# Patient Record
Sex: MALE | Race: WHITE | NOT HISPANIC OR LATINO | Employment: FULL TIME | URBAN - METROPOLITAN AREA
[De-identification: names, ages, dates, MRNs, and addresses within clinical notes are randomized per-mention and may not be internally consistent; named-entity substitution may affect disease eponyms.]

---

## 2017-01-03 ENCOUNTER — ALLSCRIPTS OFFICE VISIT (OUTPATIENT)
Dept: OTHER | Facility: OTHER | Age: 47
End: 2017-01-03

## 2017-04-03 ENCOUNTER — ALLSCRIPTS OFFICE VISIT (OUTPATIENT)
Dept: OTHER | Facility: OTHER | Age: 47
End: 2017-04-03

## 2017-08-01 ENCOUNTER — ALLSCRIPTS OFFICE VISIT (OUTPATIENT)
Dept: OTHER | Facility: OTHER | Age: 47
End: 2017-08-01

## 2017-08-30 ENCOUNTER — ALLSCRIPTS OFFICE VISIT (OUTPATIENT)
Dept: OTHER | Facility: OTHER | Age: 47
End: 2017-08-30

## 2017-10-25 RX ORDER — ESCITALOPRAM OXALATE 20 MG/1
5 TABLET ORAL DAILY
COMMUNITY
End: 2018-02-22

## 2017-10-25 NOTE — PRE-PROCEDURE INSTRUCTIONS
Pre-Surgery Instructions:   Medication Instructions    escitalopram (LEXAPRO) 20 mg tablet Instructed patient per Anesthesia Guidelines

## 2017-10-26 ENCOUNTER — ANESTHESIA EVENT (OUTPATIENT)
Dept: PERIOP | Facility: HOSPITAL | Age: 47
End: 2017-10-26
Payer: COMMERCIAL

## 2017-10-26 NOTE — ANESTHESIA PREPROCEDURE EVALUATION
Review of Systems/Medical History  Patient summary reviewed  Chart reviewed  No history of anesthetic complications     Cardiovascular  Negative cardio ROS Exercise tolerance: good,     Pulmonary  Negative pulmonary ROS ,        GI/Hepatic    GERD well controlled,   Comment: Umbilical hernia     Negative  ROS        Endo/Other  Negative endo/other ROS      GYN       Hematology  Negative hematology ROS      Musculoskeletal  Negative musculoskeletal ROS        Neurology  Negative neurology ROS      Psychology   Anxiety, Depression ,            Physical Exam    Airway    Mallampati score: III  TM Distance: >3 FB  Neck ROM: full     Dental   No notable dental hx     Cardiovascular  Comment: Negative ROS, Rhythm: regular, Rate: normal, Cardiovascular exam normal    Pulmonary  Pulmonary exam normal Breath sounds clear to auscultation,     Other Findings        Anesthesia Plan  ASA Score- 2       Anesthesia Type- general with ASA Monitors  Additional Monitors:   Airway Plan: LMA  Induction- intravenous  Informed Consent- Anesthetic plan and risks discussed with patient and mother  NPO and allergies verified  Pt took his escitalopram and Cialis this AM     Plan: GA    Risks and benefits discussed with pt  Questions answered  Pt consented

## 2017-10-27 ENCOUNTER — HOSPITAL ENCOUNTER (OUTPATIENT)
Facility: HOSPITAL | Age: 47
Setting detail: OUTPATIENT SURGERY
Discharge: HOME | End: 2017-10-27
Attending: SURGERY | Admitting: SURGERY
Payer: COMMERCIAL

## 2017-10-27 ENCOUNTER — ANESTHESIA (OUTPATIENT)
Dept: PERIOP | Facility: HOSPITAL | Age: 47
End: 2017-10-27
Payer: COMMERCIAL

## 2017-10-27 VITALS
TEMPERATURE: 98.4 F | SYSTOLIC BLOOD PRESSURE: 141 MMHG | HEIGHT: 73 IN | DIASTOLIC BLOOD PRESSURE: 89 MMHG | HEART RATE: 64 BPM | BODY MASS INDEX: 29.82 KG/M2 | WEIGHT: 225 LBS | OXYGEN SATURATION: 96 % | RESPIRATION RATE: 16 BRPM

## 2017-10-27 PROCEDURE — C1781 MESH (IMPLANTABLE): HCPCS | Performed by: SURGERY

## 2017-10-27 DEVICE — VENTRALEX ST HERNIA PATCH
Type: IMPLANTABLE DEVICE | Site: UMBILICAL | Status: FUNCTIONAL
Brand: VENTRALEX ST HERNIA PATCH

## 2017-10-27 RX ORDER — PROPOFOL 10 MG/ML
INJECTION, EMULSION INTRAVENOUS AS NEEDED
Status: DISCONTINUED | OUTPATIENT
Start: 2017-10-27 | End: 2017-10-27 | Stop reason: SURG

## 2017-10-27 RX ORDER — OXYCODONE HYDROCHLORIDE AND ACETAMINOPHEN 5; 325 MG/1; MG/1
1 TABLET ORAL EVERY 4 HOURS PRN
Qty: 20 TABLET | Refills: 0
Start: 2017-10-27 | End: 2017-11-06

## 2017-10-27 RX ORDER — FENTANYL CITRATE 50 UG/ML
INJECTION, SOLUTION INTRAMUSCULAR; INTRAVENOUS AS NEEDED
Status: DISCONTINUED | OUTPATIENT
Start: 2017-10-27 | End: 2017-10-27 | Stop reason: SURG

## 2017-10-27 RX ORDER — BUPIVACAINE HYDROCHLORIDE AND EPINEPHRINE 5; 5 MG/ML; UG/ML
INJECTION, SOLUTION PERINEURAL AS NEEDED
Status: DISCONTINUED | OUTPATIENT
Start: 2017-10-27 | End: 2017-10-27 | Stop reason: HOSPADM

## 2017-10-27 RX ORDER — SODIUM CHLORIDE, SODIUM LACTATE, POTASSIUM CHLORIDE, CALCIUM CHLORIDE 600; 310; 30; 20 MG/100ML; MG/100ML; MG/100ML; MG/100ML
100 INJECTION, SOLUTION INTRAVENOUS CONTINUOUS
Status: DISCONTINUED | OUTPATIENT
Start: 2017-10-27 | End: 2017-10-27 | Stop reason: HOSPADM

## 2017-10-27 RX ORDER — ONDANSETRON 2 MG/ML
4 INJECTION INTRAMUSCULAR; INTRAVENOUS ONCE AS NEEDED
Status: DISCONTINUED | OUTPATIENT
Start: 2017-10-27 | End: 2017-10-27 | Stop reason: HOSPADM

## 2017-10-27 RX ORDER — EPHEDRINE SULFATE 50 MG/ML
INJECTION, SOLUTION INTRAVENOUS AS NEEDED
Status: DISCONTINUED | OUTPATIENT
Start: 2017-10-27 | End: 2017-10-27 | Stop reason: SURG

## 2017-10-27 RX ORDER — ONDANSETRON 2 MG/ML
INJECTION INTRAMUSCULAR; INTRAVENOUS AS NEEDED
Status: DISCONTINUED | OUTPATIENT
Start: 2017-10-27 | End: 2017-10-27 | Stop reason: SURG

## 2017-10-27 RX ORDER — CLINDAMYCIN PHOSPHATE 900 MG/50ML
900 INJECTION INTRAVENOUS ONCE
Status: COMPLETED | OUTPATIENT
Start: 2017-10-27 | End: 2017-10-27

## 2017-10-27 RX ORDER — LIDOCAINE HYDROCHLORIDE 10 MG/ML
INJECTION, SOLUTION INFILTRATION; PERINEURAL AS NEEDED
Status: DISCONTINUED | OUTPATIENT
Start: 2017-10-27 | End: 2017-10-27 | Stop reason: SURG

## 2017-10-27 RX ORDER — KETOROLAC TROMETHAMINE 30 MG/ML
INJECTION, SOLUTION INTRAMUSCULAR; INTRAVENOUS AS NEEDED
Status: DISCONTINUED | OUTPATIENT
Start: 2017-10-27 | End: 2017-10-27 | Stop reason: SURG

## 2017-10-27 RX ORDER — TADALAFIL 2.5 MG/1
2.5 TABLET ORAL DAILY
COMMUNITY
End: 2019-02-13

## 2017-10-27 RX ORDER — FENTANYL CITRATE/PF 50 MCG/ML
50 SYRINGE (ML) INJECTION
Status: DISCONTINUED | OUTPATIENT
Start: 2017-10-27 | End: 2017-10-27 | Stop reason: HOSPADM

## 2017-10-27 RX ORDER — MAGNESIUM HYDROXIDE 1200 MG/15ML
LIQUID ORAL AS NEEDED
Status: DISCONTINUED | OUTPATIENT
Start: 2017-10-27 | End: 2017-10-27 | Stop reason: HOSPADM

## 2017-10-27 RX ADMIN — PROPOFOL 200 MG: 10 INJECTION, EMULSION INTRAVENOUS at 10:59

## 2017-10-27 RX ADMIN — SODIUM CHLORIDE, SODIUM LACTATE, POTASSIUM CHLORIDE, AND CALCIUM CHLORIDE 100 ML/HR: .6; .31; .03; .02 INJECTION, SOLUTION INTRAVENOUS at 10:08

## 2017-10-27 RX ADMIN — FENTANYL CITRATE 25 MCG: 50 INJECTION, SOLUTION INTRAMUSCULAR; INTRAVENOUS at 11:09

## 2017-10-27 RX ADMIN — KETOROLAC TROMETHAMINE 30 MG: 30 INJECTION, SOLUTION INTRAMUSCULAR at 11:31

## 2017-10-27 RX ADMIN — ONDANSETRON 4 MG: 2 INJECTION INTRAMUSCULAR; INTRAVENOUS at 11:30

## 2017-10-27 RX ADMIN — EPHEDRINE SULFATE 10 MG: 50 INJECTION, SOLUTION INTRAMUSCULAR; INTRAVENOUS; SUBCUTANEOUS at 11:19

## 2017-10-27 RX ADMIN — LIDOCAINE HYDROCHLORIDE 100 MG: 10 INJECTION, SOLUTION INFILTRATION; PERINEURAL at 10:58

## 2017-10-27 RX ADMIN — FENTANYL CITRATE 25 MCG: 50 INJECTION, SOLUTION INTRAMUSCULAR; INTRAVENOUS at 11:10

## 2017-10-27 RX ADMIN — CLINDAMYCIN PHOSPHATE 900 MG: 18 INJECTION, SOLUTION INTRAMUSCULAR; INTRAVENOUS at 11:05

## 2017-10-27 NOTE — DISCHARGE INSTRUCTIONS
Please follow-up as scheduled  Activity:  - No lifting greater than 20 pounds or strenuous physical activity or exercise for 2 weeks  - Walking and normal light activities are encouraged  - Normal daily activities including climbing steps are okay  - No driving until no longer using pain medications  Return to work:    - You may return to work on Monday, 10/30/2017    Diet:    - You may resume your normal diet  Wound Care:  - May shower daily  No tub baths or swimming until cleared by your surgeon   - Wash incision gently with soap and water and pat dry  - Do not apply any creams or ointments unless instructed to do so by your surgeon   - Margarita Wasserman may apply ice as needed (no longer than 20 minutes at a time) for the first 48 hours  - Bruising is not unusual and will go away with a little time  You may apply a warm, moist compress that may help the bruising resolve quicker  - You may remove the dressings the day after surgery (unless otherwise instructed)  Leave any skin tapes (steri-strips) on the incision(s) in place until they fall off on their own  Any new dressings are optional     Medications:    - You may resume all of your regular medications, including blood thinners and aspirin, after going home unless otherwise instructed  Please refer to your discharge medication list for further details  - Please take the pain medications as directed  - You are encouraged to use non-narcotic pain medications first and whenever possible  Reserve the use of narcotic pain medication for moderate to severe pain not controlled by non-narcotic medications   - No driving while taking narcotic pain medications  - You may become constipated, especially if taking pain medications  You may take any over the counter stool softeners or laxatives as needed  Examples: Milk of Magnesia, Colace, Senna      Additional Instructions:  - If you have any questions or concerns after discharge please call the office   - Call office or return to ER if fever greater than 101, chills, persistent nausea/vomiting, worsening/uncontrollable pain, and/or increasing redness or purulent/foul smelling drainage from incision(s)

## 2017-10-27 NOTE — ANESTHESIA POSTPROCEDURE EVALUATION
Post-Op Assessment Note      CV Status:  Stable    Mental Status:  Alert and awake    Hydration Status:  Euvolemic and stable    PONV Controlled:  None    Airway Patency:  Patent    Post Op Vitals Reviewed: Yes          Staff: Anesthesiologist           /76 (10/27/17 1151)    Temp 99 9 °F (37 7 °C) (10/27/17 1151)    Pulse 96 (10/27/17 1151)   Resp (!) 11 (10/27/17 1151)    SpO2 98 % (10/27/17 1151)      Pt transported to PACU extubated and on supplemental O2  Pt was awake and alert, with stable vital signs  Signout was given to PACU RN

## 2017-10-27 NOTE — OP NOTE
OPERATIVE REPORT  PATIENT NAME: Santana Yanes    :  1970  MRN: [id number]  Pt Location: [facility] OR ROOM 14    SURGERY DATE: 10/27/2017    Surgeon(s) and Role:     * Angie Faust MD - Primary     Roni Roca - Assisting    Preop Diagnosis:  Umbilical hernia [G45 8]    Post-Op Diagnosis Codes:     * Umbilical hernia [X33 0]    Procedure(s) (LRB):  REPAIR HERNIA UMBILICAL with mesh (N/A)    Specimen(s):  * No specimens in log *    Estimated Blood Loss:   Minimal    Drains:       Anesthesia Type:   Choice    Operative Indications:  Umbilical hernia [V97 4]      Operative Findings:  3 cm umbilical hernia fat containing    Complications:   None    Procedure and Technique:  Patient was seen in preoperative holding his most recent H and P was reviewed and was concurrent with his most recent exam no changes to his medical history were noted    Patient was brought to the operating room and identified by name and birth date he was then placed on the operating table general anesthesia was then achieved patient was given 900 mg of clindamycin due to a penicillin anaphylaxis  The patient was then draped and prepped in the usual sterile fashion a time-out was then held  An infraumbilical incision was then made dissection was carried with electrocautery down to the level of the fascia the am fat containing hernia was then dissected from the fascia circumferentially a small circular Ventralex mesh measuring 4 3 cm in diameter was then placed below the fascia this was secured in place and the fascia was closed over the mesh with an 0 Prolene suture  His umbilicus was then tacked down using a 3 0 Vicryl suture  Skin was closed with 4 0 Monocryl      Patient tolerated the procedure and there were no complications  Dr Arnaldo Lowe was present for the entire procedure        Patient Disposition:  PACU     SIGNATURE: Amanda Marinelli  DATE: 2017  TIME: 11:52 AM

## 2017-10-31 ENCOUNTER — GENERIC CONVERSION - ENCOUNTER (OUTPATIENT)
Dept: OTHER | Facility: OTHER | Age: 47
End: 2017-10-31

## 2017-11-08 ENCOUNTER — GENERIC CONVERSION - ENCOUNTER (OUTPATIENT)
Dept: OTHER | Facility: OTHER | Age: 47
End: 2017-11-08

## 2017-12-18 ENCOUNTER — GENERIC CONVERSION - ENCOUNTER (OUTPATIENT)
Dept: OTHER | Facility: OTHER | Age: 47
End: 2017-12-18

## 2017-12-18 ENCOUNTER — ALLSCRIPTS OFFICE VISIT (OUTPATIENT)
Dept: OTHER | Facility: OTHER | Age: 47
End: 2017-12-18

## 2017-12-26 DIAGNOSIS — Z13.29 ENCOUNTER FOR SCREENING FOR OTHER SUSPECTED ENDOCRINE DISORDER: ICD-10-CM

## 2017-12-26 DIAGNOSIS — K21.9 GASTRO-ESOPHAGEAL REFLUX DISEASE WITHOUT ESOPHAGITIS: ICD-10-CM

## 2017-12-26 DIAGNOSIS — Z13.1 ENCOUNTER FOR SCREENING FOR DIABETES MELLITUS: ICD-10-CM

## 2017-12-26 DIAGNOSIS — Z00.00 ENCOUNTER FOR GENERAL ADULT MEDICAL EXAMINATION WITHOUT ABNORMAL FINDINGS: ICD-10-CM

## 2017-12-26 DIAGNOSIS — Z12.5 ENCOUNTER FOR SCREENING FOR MALIGNANT NEOPLASM OF PROSTATE: ICD-10-CM

## 2017-12-26 DIAGNOSIS — Z09 ENCOUNTER FOR FOLLOW-UP EXAMINATION AFTER COMPLETED TREATMENT FOR CONDITIONS OTHER THAN MALIGNANT NEOPLASM: ICD-10-CM

## 2018-01-12 VITALS
DIASTOLIC BLOOD PRESSURE: 82 MMHG | SYSTOLIC BLOOD PRESSURE: 132 MMHG | HEIGHT: 72 IN | WEIGHT: 229.38 LBS | HEART RATE: 70 BPM | BODY MASS INDEX: 31.07 KG/M2 | RESPIRATION RATE: 16 BRPM

## 2018-01-12 NOTE — MISCELLANEOUS
Message  Patient called to reschedule his p/o appointment  He mentioned that he has a rash all over his body 24 hrs after surgery  He never took any pain medications, Has no problems with swelling or swallowing  He states that it is starting to diminish and asked if he should take Benadryl  I informed patient that I would ask Dr Angie Batista to advise and get back to patient  Spoke with Dr Angie Batista he advised that if he is itchy he can take Benadryl though if it doesn't get better to see his PCP  Called patient with information, patient understood  1        1 Amended By: Berkley Curiel; Nov 01 2017 8:53 AM EST    Active Problems   1  Depression (311) (F32 9)  2  Depression (311) (F32 9)  3  Erectile dysfunction of non-organic origin (302 72) (F52 21)  4  Esophageal reflux (530 81) (K21 9)  5  Hernia, umbilical (668 9) (Y77 2)  6  Prostate cancer screening (V76 44) (Z12 5)  7  Screening for diabetes mellitus (V77 1) (Z13 1)  8  Screening for hypothyroidism (V77 0) (Z13 29)  9  Screening for lipid disorders (V77 91) (Z13 220)    Current Meds  1  Cialis 5 MG Oral Tablet; take one tablet by mouth one time daily; Therapy: 05HEX4680 to (Evaluate:16Jun2018)  Requested for: 27OVB9025; Last   Rx:19Oct2017 Ordered  2  Escitalopram Oxalate 10 MG Oral Tablet (Lexapro); TAKE 1 TABLET ORALLY DAILY; Therapy: 42KVR8681 to (Evaluate:69Niq3018)  Requested for: 02Myy4385; Last   Rx:01Jun2017 Ordered    Allergies   1   Penicillins    Signatures   Electronically signed by : Jaxon Garcia OM; Nov 1 2017  8:54AM EST                       (Author)

## 2018-01-13 VITALS
BODY MASS INDEX: 30.85 KG/M2 | RESPIRATION RATE: 16 BRPM | SYSTOLIC BLOOD PRESSURE: 120 MMHG | WEIGHT: 227.5 LBS | DIASTOLIC BLOOD PRESSURE: 82 MMHG | HEART RATE: 68 BPM

## 2018-01-13 VITALS
SYSTOLIC BLOOD PRESSURE: 140 MMHG | HEIGHT: 72 IN | DIASTOLIC BLOOD PRESSURE: 80 MMHG | WEIGHT: 235 LBS | BODY MASS INDEX: 31.83 KG/M2 | TEMPERATURE: 98.2 F

## 2018-01-14 VITALS
RESPIRATION RATE: 16 BRPM | BODY MASS INDEX: 31.15 KG/M2 | WEIGHT: 230 LBS | HEIGHT: 72 IN | SYSTOLIC BLOOD PRESSURE: 116 MMHG | HEART RATE: 70 BPM | DIASTOLIC BLOOD PRESSURE: 80 MMHG

## 2018-01-17 NOTE — PROGRESS NOTES
Assessment    1  Screening for diabetes mellitus (V77 1) (Z13 1)   2  Screening for hypothyroidism (V77 0) (Z13 29)   3  Screening for lipid disorders (V77 91) (Z13 220)   4  Prostate cancer screening (V76 44) (Z12 5)   5  Depression (311) (F32 9)   6  Erectile dysfunction of non-organic origin (302 72) (F52 21)    Plan  Depression    · Escitalopram Oxalate 10 MG Oral Tablet (Lexapro); Take 1 tablet daily  Depression, Prostate cancer screening, Screening for diabetes mellitus, Screening for  hypothyroidism, Screening for lipid disorders    · (1) CBC/PLT/DIFF; Status:Active; Requested for:49Gxp6572;    · (1) TSH; Status:Active; Requested for:06Jlj8880;   Erectile dysfunction of non-organic origin    · Sildenafil Citrate 20 MG Oral Tablet; take 2-3 tabs as needed no more than once a  day  Prostate cancer screening, Screening for diabetes mellitus, Screening for  hypothyroidism, Screening for lipid disorders    · (1) COMPREHENSIVE METABOLIC PANEL; Status:Active; Requested for:37Whu3722;    · (1) LIPID PANEL FASTING W DIRECT LDL REFLEX; Status:Active; Requested  for:30Pte0915;    · (1) PSA (SCREEN) (Dx V76 44 Screen for Prostate Cancer); Status:Active; Requested  for:54Jru1396;    · Follow-up visit in 3 weeks Evaluation and Treatment  Follow-up  Status: Complete  Done:  [id number]    History of Present Illness  HPI: Patient is beginning to have significant anxiety and depression again    He has an SO and she is concerned  Patient has been treated in the past with Lexapro and did very well  He would like to start again  There is no suicidal ideation  There is no abuse of his partner  He has ED  He would like to try a course of sildenafil  Certainly his overal l depression and anxiety is playing a role, but he would like to use sildenafil for a period of time as he becomes more confident  He does get an erection, but is unable to finish intercourse  This has been a recurrent problem from time to time   It has responded well to cialis  Review of Systems    Constitutional: No fever or chills, feels well, no tiredness, no recent weight gain or weight loss  Eyes: No complaints of eye pain, no red eyes, no discharge from eyes, no itchy eyes  ENT: no complaints of earache, no hearing loss, no nosebleeds, no nasal discharge, no sore throat, no hoarseness  Cardiovascular: No complaints of slow heart rate, no fast heart rate, no chest pain, no palpitations, no leg claudication, no lower extremity  Respiratory: No complaints of shortness of breath, no wheezing, no cough, no SOB on exertion, no orthopnea or PND  Active Problems    1  Depression (311) (F32 9)   2  Depression (311) (F32 9)   3  Erectile dysfunction of non-organic origin (302 72) (F52 21)    Past Medical History    · History of Denial Of Any Significant Medical History    Surgical History    · History of Knee Surgery    Family History  Family History    · Family history of Family Health Status Of Mother - Alive    Social History    · Being A Social Drinker   · Caffeine Use   · Never smoker    Current Meds   1  Escitalopram Oxalate 10 MG Oral Tablet; Take 1 tablet daily; Therapy: 26RKH8883 to (Tre Wang)  Requested for: 05Hfz4420; Last   Rx:00Qtx7399 Ordered    Allergies    1  Penicillins    Vitals   Recorded: 20WYN9216 03:13PM   Heart Rate 68   Respiration 16   Systolic 028   Diastolic 78   Weight 697 lb 8 0 oz     Physical Exam    Constitutional   General appearance: No acute distress, well appearing and well nourished  Psychiatric   Judgment and insight: Normal     Orientation to person, place and time: Normal     Recent and remote memory: Intact  Mood and affect: Normal        Future Appointments    Date/Time Provider Specialty Site   01/03/2017 08:30 AM DONITA Leon   [address] [facility]     Signatures   Electronically signed by : DONITA Bennett ; Dec  5 2016 10:30PM EST                       (Author)

## 2018-01-22 VITALS
RESPIRATION RATE: 16 BRPM | SYSTOLIC BLOOD PRESSURE: 110 MMHG | HEART RATE: 64 BPM | WEIGHT: 236.25 LBS | DIASTOLIC BLOOD PRESSURE: 70 MMHG | BODY MASS INDEX: 32.04 KG/M2

## 2018-01-22 VITALS
HEIGHT: 72 IN | WEIGHT: 233.25 LBS | SYSTOLIC BLOOD PRESSURE: 120 MMHG | BODY MASS INDEX: 31.59 KG/M2 | DIASTOLIC BLOOD PRESSURE: 80 MMHG

## 2018-01-23 NOTE — PROGRESS NOTES
Assessment    1  Encounter for preventive health examination (V70 0) (Z00 00)   2  Esophageal reflux (530 81) (K21 9)   3  Prostate cancer screening (V76 44) (Z12 5)   4  Special screening examination for diabetes mellitus (V77 1) (Z13 1)   5  Screening for hypothyroidism (V77 0) (Z13 29)    Plan  Depression    · Escitalopram Oxalate 10 MG Oral Tablet (Lexapro); TAKE 1 TABLET ORALLY  DAILY   · Follow-up visit in 6 months Evaluation and Treatment  Follow-up  Status: Hold For -  Scheduling  Requested for: 52PPZ0106  Depression, Health Maintenance, Esophageal reflux    · Follow-up visit in 1 year Evaluation and Treatment  Follow-up and CPX  Status: Complete   Done: 29SOC5627  Erectile dysfunction of non-organic origin    · Cialis 5 MG Oral Tablet; take one tablet by mouth one time daily  Health Maintenance, Esophageal reflux, Postoperative examination, Prostate cancer  screening, Screening for hypothyroidism, Special screening examination for diabetes  mellitus    · (1) CBC/PLT/DIFF; Status:Active; Requested for:97Kng3547;    · (1) COMPREHENSIVE METABOLIC PANEL; Status:Active; Requested for:88Dlu0073;    · (1) LIPID PANEL FASTING W DIRECT LDL REFLEX; Status:Active; Requested  for:52Ohf1670;    · (1) PSA (SCREEN) (Dx V76 44 Screen for Prostate Cancer); Status:Active; Requested  for:12Aql1244;    · (1) TSH; Status:Active; Requested for:24Jlz0560;     Discussion/Summary  Impression: health maintenance visit  Currently, he eats a healthy diet and has an adequate exercise regimen  Prostate cancer screening: prostate cancer screening is current and PSA was ordered  Testicular cancer screening: monthly self testicular exam was advised  Colorectal cancer screening: the risks and benefits of colorectal cancer screening were discussed and colorectal cancer screening is not indicated   He was advised to be evaluated by an optometrist  Advice and education were given regarding nutrition, aerobic exercise, weight bearing exercise and weight loss  Patient discussion: discussed with the patient  History of Present Illness  HM, Adult Male: The patient is being seen for a health maintenance evaluation  General Health: The patient's health since the last visit is described as good  He has regular dental visits  He denies vision problems  He denies hearing loss  Immunizations status: up to date  Lifestyle:  He consumes a diverse and healthy diet  He does not have any weight concerns  He exercises regularly  He does not use tobacco  He denies alcohol use  He denies drug use  Screening: cancer screening reviewed and current  metabolic screening reviewed and current  risk screening reviewed and current  HPI: Tammy Duran for yearly physical      Overall he is doing well he has in good health  We do treat him for generalized anxiety with Lexapro  It is working very well  He has no problems taking Lexapro and wishes to continue to do so  He has esophageal reflux which is mild and he manages on a p r n  basis only  If he avoid certain foods he does well  He also has ED  He takes Cialis 5 milligrams q day  It works well she is tolerating the medication he has no complaints in this regard  Review of Systems    Constitutional: No fever or chills, feels well, no tiredness, no recent weight gain or weight loss  Eyes: No complaints of eye pain, no red eyes, no discharge from eyes, no itchy eyes  ENT: no complaints of earache, no hearing loss, no nosebleeds, no nasal discharge, no sore throat, no hoarseness  Cardiovascular: No complaints of slow heart rate, no fast heart rate, no chest pain, no palpitations, no leg claudication, no lower extremity  Respiratory: No complaints of shortness of breath, no wheezing, no cough, no SOB on exertion, no orthopnea or PND  Gastrointestinal: No complaints of abdominal pain, no constipation, no nausea or vomiting, no diarrhea or bloody stools     Genitourinary: No complaints of dysuria, no incontinence, no hesitancy, no nocturia, no genital lesion, no testicular pain  Musculoskeletal: No complaints of arthralgia, no myalgias, no joint swelling or stiffness, no limb pain or swelling  Integumentary: No complaints of skin rash or skin lesions, no itching, no skin wound, no dry skin  Neurological: No compliants of headache, no confusion, no convulsions, no numbness or tingling, no dizziness or fainting, no limb weakness, no difficulty walking  Psychiatric: Is not suicidal, no sleep disturbances, no anxiety or depression, no change in personality, no emotional problems  Endocrine: No complaints of proptosis, no hot flashes, no muscle weakness, no erectile dysfunction, no deepening of the voice, no feelings of weakness  Hematologic/Lymphatic: No complaints of swollen glands, no swollen glands in the neck, does not bleed easily, no easy bruising  Active Problems    1  Depression (311) (F32 9)   2  Depression (311) (F32 9)   3  Erectile dysfunction of non-organic origin (302 72) (F52 21)   4  Esophageal reflux (530 81) (K21 9)   5  Postoperative examination (V67 00) (Z09)   6  Prostate cancer screening (V76 44) (Z12 5)   7  Screening for hypothyroidism (V77 0) (Z13 29)   8  Screening for lipid disorders (V77 91) (Z13 220)   9  Special screening examination for diabetes mellitus (V77 1) (Z13 1)    Past Medical History    · History of Denial Of Any Significant Medical History   · History of umbilical hernia (L15 57) (Z87 19)    Surgical History    · History of Knee Surgery    Family History  Family History    · Family history of Family Health Status Of Mother - Alive    The family history was reviewed and updated today  Social History    · Being A Social Drinker   · Caffeine Use   · Never smoker    Current Meds   1  Cialis 5 MG Oral Tablet; take one tablet by mouth one time daily; Therapy: 11XWD1857 to (Evaluate:16Jun2018)  Requested for: 08OIN1306; Last   Rx:19Oct2017 Ordered   2  Escitalopram Oxalate 10 MG Oral Tablet; TAKE 1 TABLET ORALLY DAILY; Therapy: 89MWM2936 to (Evaluate:06Rgm2373)  Requested for: 01Aug2017; Last   Rx:01Jun2017 Ordered    Allergies    1  Penicillins    Vitals   Recorded: 44SEQ0181 01:58PM   Heart Rate 64   Respiration 16   Systolic 980   Diastolic 70   Weight 069 lb 4 oz   BMI Calculated 32 04   BSA Calculated 2 29     Physical Exam    Constitutional   General appearance: No acute distress, well appearing and well nourished  Eyes   Conjunctiva and lids: No swelling, erythema, or discharge  Pupils and irises: Equal, round and reactive to light  Ears, Nose, Mouth, and Throat   External inspection of ears and nose: Normal     Otoscopic examination: Tympanic membrance translucent with normal light reflex  Canals patent without erythema  Oropharynx: Normal with no erythema, edema, exudate or lesions  Pulmonary   Respiratory effort: No increased work of breathing or signs of respiratory distress  Auscultation of lungs: Clear to auscultation  Cardiovascular   Palpation of heart: Normal PMI, no thrills  Auscultation of heart: Normal rate and rhythm, normal S1 and S2, without murmurs  Examination of extremities for edema and/or varicosities: Normal     Abdomen   Abdomen: Non-tender, no masses  Liver and spleen: No hepatomegaly or splenomegaly  Lymphatic   Palpation of lymph nodes in neck: No lymphadenopathy  Musculoskeletal   Gait and station: Normal     Digits and nails: Normal without clubbing or cyanosis  Inspection/palpation of joints, bones, and muscles: Normal     Skin   Skin and subcutaneous tissue: Normal without rashes or lesions  Neurologic   Cranial nerves: Cranial nerves 2-12 intact  Reflexes: 2+ and symmetric  Sensation: No sensory loss      Psychiatric   Orientation to person, place and time: Normal     Mood and affect: Normal        Future Appointments    Date/Time Provider Specialty Site   06/20/2018 03:00 PM DONITA Leon  [address] [facility]   12/19/2018 02:00 PM DONITA Leon   [address] [facility] [employer]     Signatures   Electronically signed by : DONITA Bennett ; Dec 18 2017  2:43PM EST                       (Author)

## 2018-02-22 DIAGNOSIS — F43.23 ADJUSTMENT REACTION WITH ANXIETY AND DEPRESSION: Primary | ICD-10-CM

## 2018-02-22 RX ORDER — ESCITALOPRAM OXALATE 10 MG/1
TABLET ORAL
Qty: 30 TABLET | Refills: 0 | Status: SHIPPED | OUTPATIENT
Start: 2018-02-22 | End: 2018-03-28 | Stop reason: SDUPTHER

## 2018-03-28 DIAGNOSIS — F43.23 ADJUSTMENT REACTION WITH ANXIETY AND DEPRESSION: ICD-10-CM

## 2018-03-29 RX ORDER — ESCITALOPRAM OXALATE 10 MG/1
TABLET ORAL
Qty: 90 TABLET | Refills: 1 | Status: SHIPPED | OUTPATIENT
Start: 2018-03-29 | End: 2018-09-30 | Stop reason: SDUPTHER

## 2018-05-06 ENCOUNTER — OFFICE VISIT (OUTPATIENT)
Dept: URGENT CARE | Facility: MEDICAL CENTER | Age: 48
End: 2018-05-06
Payer: COMMERCIAL

## 2018-05-06 VITALS
DIASTOLIC BLOOD PRESSURE: 78 MMHG | WEIGHT: 228 LBS | BODY MASS INDEX: 30.92 KG/M2 | RESPIRATION RATE: 18 BRPM | TEMPERATURE: 98.1 F | SYSTOLIC BLOOD PRESSURE: 113 MMHG | HEART RATE: 67 BPM | OXYGEN SATURATION: 97 %

## 2018-05-06 DIAGNOSIS — M54.50 ACUTE LEFT-SIDED LOW BACK PAIN WITHOUT SCIATICA: Primary | ICD-10-CM

## 2018-05-06 PROCEDURE — 99213 OFFICE O/P EST LOW 20 MIN: CPT | Performed by: PHYSICIAN ASSISTANT

## 2018-05-06 RX ORDER — CYCLOBENZAPRINE HCL 10 MG
10 TABLET ORAL 3 TIMES DAILY PRN
Qty: 15 TABLET | Refills: 0 | Status: SHIPPED | OUTPATIENT
Start: 2018-05-06 | End: 2019-04-01 | Stop reason: ALTCHOICE

## 2018-05-06 NOTE — PROGRESS NOTES
[facility]        NAME: Yuan Cannon is a 41 y o  male  : 1970    MRN: [id number]  DATE: May 6, 2018  TIME: 10:31 AM    Assessment and Plan   Acute left-sided low back pain without sciatica [M54 5]  1  Acute left-sided low back pain without sciatica  cyclobenzaprine (FLEXERIL) 10 mg tablet         Patient Instructions       Follow up with PCP in 3-5 days  Proceed to  ER if symptoms worsen  Chief Complaint     Chief Complaint   Patient presents with    Back Pain     started on friday after moving furniture  Pain is in lower thoracic and radiates to front, Hx of back pain since " 17 yrs old"          History of Present Illness       The patient is a 42-year-old male who presents with left-sided lower back pain that started 2 days prior  He states he was helping a friend move furniture when he started to experience some is a discomfort in his lumbar region  The patient denies any radiation into his left hip, lower leg or foot  He denies any numbness, tingling or weakness since the onset of his symptoms  The patient denies any changes to his bowel or bladder functions  Review of Systems   Review of Systems   Constitutional: Negative  Musculoskeletal: Positive for back pain and myalgias  Negative for gait problem           Current Medications       Current Outpatient Prescriptions:     escitalopram (LEXAPRO) 10 mg tablet, TAKE 1 TABLET BY MOUTH EVERY DAY, Disp: 90 tablet, Rfl: 1    tadalafil (CIALIS) 2 5 MG tablet, Take 2 5 mg by mouth daily, Disp: , Rfl:     cyclobenzaprine (FLEXERIL) 10 mg tablet, Take 1 tablet (10 mg total) by mouth 3 (three) times a day as needed for muscle spasms for up to 5 days, Disp: 15 tablet, Rfl: 0    Current Allergies     Allergies as of 2018 - Reviewed 2018   Allergen Reaction Noted    Penicillin v Hives 10/25/2017            The following portions of the patient's history were reviewed and updated as appropriate: allergies, current medications, past family history, past medical history, past social history, past surgical history and problem list      Past Medical History:   Diagnosis Date    Anxiety     Umbilical hernia     last assessed 08/30/17       Past Surgical History:   Procedure Laterality Date    KNEE SURGERY      DE REPAIR UMBILICAL CHARMAINE,2+D/H,HZPSP N/A 10/27/2017    Procedure: REPAIR HERNIA UMBILICAL with mesh;  Surgeon: Brian Chan MD;  Location: [facility];  Service: General       No family history on file  Medications have been verified  Objective   /78   Pulse 67   Temp 98 1 °F (36 7 °C) (Temporal)   Resp 18   Wt 103 kg (228 lb)   SpO2 97%   BMI 30 92 kg/m²        Physical Exam     Physical Exam   Constitutional: He appears well-developed and well-nourished  No distress  Cardiovascular: Normal rate, regular rhythm and normal heart sounds  No murmur heard  Pulmonary/Chest: Effort normal and breath sounds normal    Musculoskeletal:        Arms:  Neurological: He has normal strength  No sensory deficit  Reflex Scores:       Patellar reflexes are 2+ on the right side and 2+ on the left side         Achilles reflexes are 2+ on the right side and 2+ on the left side   - SLR, sensation was intact to the lower extremity dermatomes bilateral, strength was 5+ and equal bilateral

## 2018-05-06 NOTE — PATIENT INSTRUCTIONS
1  Heat to lunbar area 10-15min 3-4x daily  2  Motrin as needed for pain  3  Take Flexeril 10mg  1 tablet every 8 hours as needed for spasms  4   Follow-up with PCP if symptoms persist

## 2018-06-12 DIAGNOSIS — N52.9 ERECTILE DYSFUNCTION, UNSPECIFIED ERECTILE DYSFUNCTION TYPE: Primary | ICD-10-CM

## 2018-06-14 RX ORDER — TADALAFIL 5 MG
TABLET ORAL
Qty: 30 TABLET | Refills: 7 | Status: SHIPPED | OUTPATIENT
Start: 2018-06-14 | End: 2018-12-19

## 2018-06-20 ENCOUNTER — APPOINTMENT (OUTPATIENT)
Dept: LAB | Facility: MEDICAL CENTER | Age: 48
End: 2018-06-20
Payer: COMMERCIAL

## 2018-06-20 DIAGNOSIS — Z12.5 ENCOUNTER FOR SCREENING FOR MALIGNANT NEOPLASM OF PROSTATE: ICD-10-CM

## 2018-06-20 DIAGNOSIS — Z09 ENCOUNTER FOR FOLLOW-UP EXAMINATION AFTER COMPLETED TREATMENT FOR CONDITIONS OTHER THAN MALIGNANT NEOPLASM: ICD-10-CM

## 2018-06-20 DIAGNOSIS — Z13.29 ENCOUNTER FOR SCREENING FOR OTHER SUSPECTED ENDOCRINE DISORDER: ICD-10-CM

## 2018-06-20 DIAGNOSIS — Z13.1 ENCOUNTER FOR SCREENING FOR DIABETES MELLITUS: ICD-10-CM

## 2018-06-20 DIAGNOSIS — Z00.00 ENCOUNTER FOR GENERAL ADULT MEDICAL EXAMINATION WITHOUT ABNORMAL FINDINGS: ICD-10-CM

## 2018-06-20 DIAGNOSIS — K21.9 GASTRO-ESOPHAGEAL REFLUX DISEASE WITHOUT ESOPHAGITIS: ICD-10-CM

## 2018-06-20 LAB
ALBUMIN SERPL BCP-MCNC: 3.8 G/DL (ref 3.5–5)
ALP SERPL-CCNC: 69 U/L (ref 46–116)
ALT SERPL W P-5'-P-CCNC: 28 U/L (ref 12–78)
ANION GAP SERPL CALCULATED.3IONS-SCNC: 8 MMOL/L (ref 4–13)
AST SERPL W P-5'-P-CCNC: 16 U/L (ref 5–45)
BASOPHILS # BLD AUTO: 0.04 THOUSANDS/ΜL (ref 0–0.1)
BASOPHILS NFR BLD AUTO: 1 % (ref 0–1)
BILIRUB SERPL-MCNC: 0.28 MG/DL (ref 0.2–1)
BUN SERPL-MCNC: 13 MG/DL (ref 5–25)
CALCIUM SERPL-MCNC: 8.6 MG/DL (ref 8.3–10.1)
CHLORIDE SERPL-SCNC: 105 MMOL/L (ref 100–108)
CHOLEST SERPL-MCNC: 183 MG/DL (ref 50–200)
CO2 SERPL-SCNC: 28 MMOL/L (ref 21–32)
CREAT SERPL-MCNC: 1.14 MG/DL (ref 0.6–1.3)
EOSINOPHIL # BLD AUTO: 0.14 THOUSAND/ΜL (ref 0–0.61)
EOSINOPHIL NFR BLD AUTO: 3 % (ref 0–6)
ERYTHROCYTE [DISTWIDTH] IN BLOOD BY AUTOMATED COUNT: 12.2 % (ref 11.6–15.1)
GFR SERPL CREATININE-BSD FRML MDRD: 77 ML/MIN/1.73SQ M
GLUCOSE P FAST SERPL-MCNC: 86 MG/DL (ref 65–99)
HCT VFR BLD AUTO: 45.6 % (ref 36.5–49.3)
HDLC SERPL-MCNC: 40 MG/DL (ref 40–60)
HGB BLD-MCNC: 14.8 G/DL (ref 12–17)
IMM GRANULOCYTES # BLD AUTO: 0.01 THOUSAND/UL (ref 0–0.2)
IMM GRANULOCYTES NFR BLD AUTO: 0 % (ref 0–2)
LDLC SERPL CALC-MCNC: 125 MG/DL (ref 0–100)
LYMPHOCYTES # BLD AUTO: 1.41 THOUSANDS/ΜL (ref 0.6–4.47)
LYMPHOCYTES NFR BLD AUTO: 26 % (ref 14–44)
MCH RBC QN AUTO: 30.4 PG (ref 26.8–34.3)
MCHC RBC AUTO-ENTMCNC: 32.5 G/DL (ref 31.4–37.4)
MCV RBC AUTO: 94 FL (ref 82–98)
MONOCYTES # BLD AUTO: 0.52 THOUSAND/ΜL (ref 0.17–1.22)
MONOCYTES NFR BLD AUTO: 10 % (ref 4–12)
NEUTROPHILS # BLD AUTO: 3.27 THOUSANDS/ΜL (ref 1.85–7.62)
NEUTS SEG NFR BLD AUTO: 60 % (ref 43–75)
NRBC BLD AUTO-RTO: 0 /100 WBCS
PLATELET # BLD AUTO: 229 THOUSANDS/UL (ref 149–390)
PMV BLD AUTO: 11.8 FL (ref 8.9–12.7)
POTASSIUM SERPL-SCNC: 4.7 MMOL/L (ref 3.5–5.3)
PROT SERPL-MCNC: 7.2 G/DL (ref 6.4–8.2)
PSA SERPL-MCNC: 0.8 NG/ML (ref 0–4)
RBC # BLD AUTO: 4.87 MILLION/UL (ref 3.88–5.62)
SODIUM SERPL-SCNC: 141 MMOL/L (ref 136–145)
TRIGL SERPL-MCNC: 91 MG/DL
TSH SERPL DL<=0.05 MIU/L-ACNC: 1.59 UIU/ML (ref 0.36–3.74)
WBC # BLD AUTO: 5.39 THOUSAND/UL (ref 4.31–10.16)

## 2018-06-20 PROCEDURE — G0103 PSA SCREENING: HCPCS

## 2018-06-20 PROCEDURE — 84443 ASSAY THYROID STIM HORMONE: CPT

## 2018-06-20 PROCEDURE — 85025 COMPLETE CBC W/AUTO DIFF WBC: CPT

## 2018-06-20 PROCEDURE — 80061 LIPID PANEL: CPT

## 2018-06-20 PROCEDURE — 36415 COLL VENOUS BLD VENIPUNCTURE: CPT

## 2018-06-20 PROCEDURE — 80053 COMPREHEN METABOLIC PANEL: CPT

## 2018-09-30 DIAGNOSIS — F43.23 ADJUSTMENT REACTION WITH ANXIETY AND DEPRESSION: ICD-10-CM

## 2018-10-01 RX ORDER — ESCITALOPRAM OXALATE 10 MG/1
TABLET ORAL
Qty: 90 TABLET | Refills: 1 | Status: SHIPPED | OUTPATIENT
Start: 2018-10-01 | End: 2019-03-24 | Stop reason: SDUPTHER

## 2018-12-19 ENCOUNTER — OFFICE VISIT (OUTPATIENT)
Dept: FAMILY MEDICINE CLINIC | Facility: MEDICAL CENTER | Age: 48
End: 2018-12-19
Payer: COMMERCIAL

## 2018-12-19 VITALS
RESPIRATION RATE: 14 BRPM | HEART RATE: 90 BPM | BODY MASS INDEX: 31.92 KG/M2 | DIASTOLIC BLOOD PRESSURE: 80 MMHG | SYSTOLIC BLOOD PRESSURE: 126 MMHG | WEIGHT: 235.38 LBS

## 2018-12-19 DIAGNOSIS — K21.9 GASTROESOPHAGEAL REFLUX DISEASE, ESOPHAGITIS PRESENCE NOT SPECIFIED: Primary | ICD-10-CM

## 2018-12-19 DIAGNOSIS — F43.23 ADJUSTMENT REACTION WITH ANXIETY AND DEPRESSION: ICD-10-CM

## 2018-12-19 PROCEDURE — 99213 OFFICE O/P EST LOW 20 MIN: CPT | Performed by: FAMILY MEDICINE

## 2018-12-19 RX ORDER — OMEPRAZOLE 40 MG/1
40 CAPSULE, DELAYED RELEASE ORAL DAILY
Qty: 90 CAPSULE | Refills: 1 | Status: SHIPPED | OUTPATIENT
Start: 2018-12-19 | End: 2019-04-01 | Stop reason: SDUPTHER

## 2018-12-19 NOTE — ASSESSMENT & PLAN NOTE
Patient has ongoing reflux symptoms  It is particularly bad when he has eaten a lot during the day  Evening is worse particularly when he is asleep  Tums usually resolve the symptoms  Because of the frequency of his GERD I would like to start with a PPI for several months    Observe response and either refer for EGD or see if we can transition to a H2 blocker

## 2018-12-19 NOTE — ASSESSMENT & PLAN NOTE
Patient is doing well with the Lexapro  He has no side effects from the Lexapro  He does have some weight gain which might be partially related to that although he was able to lose weight earlier this year  Continue Lexapro, continue routine follow-up

## 2018-12-19 NOTE — PROGRESS NOTES
Assessment/Plan:    Esophageal reflux  Patient has ongoing reflux symptoms  It is particularly bad when he has eaten a lot during the day  Evening is worse particularly when he is asleep  Tums usually resolve the symptoms  Because of the frequency of his GERD I would like to start with a PPI for several months  Observe response and either refer for EGD or see if we can transition to a H2 blocker    Adjustment reaction with anxiety and depression  Patient is doing well with the Lexapro  He has no side effects from the Lexapro  He does have some weight gain which might be partially related to that although he was able to lose weight earlier this year  Continue Lexapro, continue routine follow-up  Diagnoses and all orders for this visit:    Gastroesophageal reflux disease, esophagitis presence not specified  -     omeprazole (PriLOSEC) 40 MG capsule; Take 1 capsule (40 mg total) by mouth daily    Adjustment reaction with anxiety and depression          Subjective:      Patient ID: Nate Preston is a 57 y o  male  Here for routine follow up of medical problems  Please see assesment and plan for discussion  The following portions of the patient's history were reviewed and updated as appropriate: allergies, current medications, past family history, past medical history, past social history, past surgical history and problem list     Review of Systems   Constitutional: Negative for activity change, fatigue and fever  HENT: Negative for congestion, ear discharge, ear pain, postnasal drip, rhinorrhea, sinus pain, sneezing and sore throat  Eyes: Negative for photophobia, pain, discharge and redness  Respiratory: Negative for apnea, cough, shortness of breath and wheezing  Cardiovascular: Negative for chest pain and palpitations  Gastrointestinal: Positive for abdominal pain  Negative for blood in stool, constipation, diarrhea, nausea and vomiting   Abdominal distention: heartburn and indigestion frequently  Endocrine: Negative for polydipsia, polyphagia and polyuria  Genitourinary: Negative for decreased urine volume, difficulty urinating, discharge, dysuria, frequency, penile pain and urgency  Musculoskeletal: Negative for arthralgias, gait problem, joint swelling and neck pain  Skin: Negative for color change and rash  Neurological: Negative for dizziness, tremors, seizures, weakness and headaches  Psychiatric/Behavioral: Negative for agitation and sleep disturbance  The patient is not nervous/anxious  Objective:      /80   Pulse 90   Resp 14   Wt 107 kg (235 lb 6 oz)   BMI 31 92 kg/m²          Physical Exam   Constitutional: He is oriented to person, place, and time  Vital signs are normal  He appears well-developed and well-nourished  He is cooperative  HENT:   Head: Normocephalic  Right Ear: External ear normal    Left Ear: External ear normal    Nose: Nose normal    Mouth/Throat: Oropharynx is clear and moist    Eyes: Pupils are equal, round, and reactive to light  Conjunctivae, EOM and lids are normal    Neck: Normal range of motion  Neck supple  Carotid bruit is not present  No thyromegaly present  Cardiovascular: Normal rate, regular rhythm, S1 normal, S2 normal, normal heart sounds, intact distal pulses and normal pulses  No murmur heard  Pulmonary/Chest: Effort normal and breath sounds normal  No respiratory distress  He has no wheezes  He has no rales  Abdominal: Soft  Normal appearance and bowel sounds are normal  He exhibits no mass  There is no hepatosplenomegaly  There is no tenderness  Musculoskeletal: Normal range of motion  Lymphadenopathy:     He has no cervical adenopathy  Neurological: He is alert and oriented to person, place, and time  He has normal strength and normal reflexes  No cranial nerve deficit or sensory deficit  Skin: Skin is warm, dry and intact  No rash noted  No pallor     Psychiatric: He has a normal mood and affect  His behavior is normal  Judgment and thought content normal  Cognition and memory are normal    Nursing note and vitals reviewed

## 2019-02-13 DIAGNOSIS — N52.9 ERECTILE DYSFUNCTION, UNSPECIFIED ERECTILE DYSFUNCTION TYPE: ICD-10-CM

## 2019-02-13 RX ORDER — TADALAFIL 5 MG/1
TABLET ORAL
Qty: 30 TABLET | Refills: 7 | Status: SHIPPED | OUTPATIENT
Start: 2019-02-13 | End: 2019-10-15 | Stop reason: SDUPTHER

## 2019-03-24 DIAGNOSIS — F43.23 ADJUSTMENT REACTION WITH ANXIETY AND DEPRESSION: ICD-10-CM

## 2019-03-25 RX ORDER — ESCITALOPRAM OXALATE 10 MG/1
TABLET ORAL
Qty: 90 TABLET | Refills: 1 | Status: SHIPPED | OUTPATIENT
Start: 2019-03-25 | End: 2019-09-17 | Stop reason: SDUPTHER

## 2019-04-01 ENCOUNTER — OFFICE VISIT (OUTPATIENT)
Dept: FAMILY MEDICINE CLINIC | Facility: MEDICAL CENTER | Age: 49
End: 2019-04-01
Payer: COMMERCIAL

## 2019-04-01 VITALS
DIASTOLIC BLOOD PRESSURE: 78 MMHG | OXYGEN SATURATION: 97 % | WEIGHT: 246 LBS | SYSTOLIC BLOOD PRESSURE: 120 MMHG | HEART RATE: 78 BPM | BODY MASS INDEX: 33.36 KG/M2

## 2019-04-01 DIAGNOSIS — Z13.1 SCREENING FOR DIABETES MELLITUS: Primary | ICD-10-CM

## 2019-04-01 DIAGNOSIS — Z13.29 SCREENING FOR THYROID DISORDER: ICD-10-CM

## 2019-04-01 DIAGNOSIS — Z13.220 SCREENING FOR LIPID DISORDERS: ICD-10-CM

## 2019-04-01 DIAGNOSIS — K21.9 GASTROESOPHAGEAL REFLUX DISEASE, ESOPHAGITIS PRESENCE NOT SPECIFIED: ICD-10-CM

## 2019-04-01 DIAGNOSIS — F52.21 ERECTILE DYSFUNCTION OF NON-ORGANIC ORIGIN: ICD-10-CM

## 2019-04-01 DIAGNOSIS — F43.23 ADJUSTMENT REACTION WITH ANXIETY AND DEPRESSION: ICD-10-CM

## 2019-04-01 PROCEDURE — 1036F TOBACCO NON-USER: CPT | Performed by: FAMILY MEDICINE

## 2019-04-01 PROCEDURE — 99214 OFFICE O/P EST MOD 30 MIN: CPT | Performed by: FAMILY MEDICINE

## 2019-04-01 RX ORDER — OMEPRAZOLE 40 MG/1
40 CAPSULE, DELAYED RELEASE ORAL DAILY
Qty: 90 CAPSULE | Refills: 1
Start: 2019-04-01 | End: 2019-12-09

## 2019-06-15 DIAGNOSIS — K21.9 GASTROESOPHAGEAL REFLUX DISEASE, ESOPHAGITIS PRESENCE NOT SPECIFIED: ICD-10-CM

## 2019-06-17 RX ORDER — OMEPRAZOLE 40 MG/1
CAPSULE, DELAYED RELEASE ORAL
Qty: 90 CAPSULE | Refills: 1 | Status: SHIPPED | OUTPATIENT
Start: 2019-06-17 | End: 2019-10-01 | Stop reason: SDUPTHER

## 2019-09-06 DIAGNOSIS — G47.30 SLEEP APNEA, UNSPECIFIED TYPE: Primary | ICD-10-CM

## 2019-09-11 ENCOUNTER — APPOINTMENT (OUTPATIENT)
Dept: LAB | Facility: MEDICAL CENTER | Age: 49
End: 2019-09-11
Payer: COMMERCIAL

## 2019-09-11 DIAGNOSIS — Z13.1 SCREENING FOR DIABETES MELLITUS: ICD-10-CM

## 2019-09-11 DIAGNOSIS — Z13.29 SCREENING FOR THYROID DISORDER: ICD-10-CM

## 2019-09-11 DIAGNOSIS — K21.9 GASTROESOPHAGEAL REFLUX DISEASE, ESOPHAGITIS PRESENCE NOT SPECIFIED: ICD-10-CM

## 2019-09-11 DIAGNOSIS — Z13.220 SCREENING FOR LIPID DISORDERS: ICD-10-CM

## 2019-09-11 LAB
ALBUMIN SERPL BCP-MCNC: 3.9 G/DL (ref 3.5–5)
ALP SERPL-CCNC: 65 U/L (ref 46–116)
ALT SERPL W P-5'-P-CCNC: 38 U/L (ref 12–78)
ANION GAP SERPL CALCULATED.3IONS-SCNC: 5 MMOL/L (ref 4–13)
AST SERPL W P-5'-P-CCNC: 17 U/L (ref 5–45)
BILIRUB SERPL-MCNC: 0.57 MG/DL (ref 0.2–1)
BUN SERPL-MCNC: 16 MG/DL (ref 5–25)
CALCIUM SERPL-MCNC: 8.8 MG/DL (ref 8.3–10.1)
CHLORIDE SERPL-SCNC: 108 MMOL/L (ref 100–108)
CHOLEST SERPL-MCNC: 218 MG/DL (ref 50–200)
CO2 SERPL-SCNC: 27 MMOL/L (ref 21–32)
CREAT SERPL-MCNC: 1.17 MG/DL (ref 0.6–1.3)
GFR SERPL CREATININE-BSD FRML MDRD: 74 ML/MIN/1.73SQ M
GLUCOSE P FAST SERPL-MCNC: 92 MG/DL (ref 65–99)
HDLC SERPL-MCNC: 38 MG/DL (ref 40–60)
LDLC SERPL CALC-MCNC: 128 MG/DL (ref 0–100)
POTASSIUM SERPL-SCNC: 4.1 MMOL/L (ref 3.5–5.3)
PROT SERPL-MCNC: 7.1 G/DL (ref 6.4–8.2)
SODIUM SERPL-SCNC: 140 MMOL/L (ref 136–145)
TRIGL SERPL-MCNC: 261 MG/DL
TSH SERPL DL<=0.05 MIU/L-ACNC: 1.78 UIU/ML (ref 0.36–3.74)

## 2019-09-11 PROCEDURE — 80053 COMPREHEN METABOLIC PANEL: CPT

## 2019-09-11 PROCEDURE — 36415 COLL VENOUS BLD VENIPUNCTURE: CPT

## 2019-09-11 PROCEDURE — 84443 ASSAY THYROID STIM HORMONE: CPT

## 2019-09-11 PROCEDURE — 80061 LIPID PANEL: CPT

## 2019-09-17 DIAGNOSIS — F43.23 ADJUSTMENT REACTION WITH ANXIETY AND DEPRESSION: ICD-10-CM

## 2019-09-18 RX ORDER — ESCITALOPRAM OXALATE 10 MG/1
TABLET ORAL
Qty: 90 TABLET | Refills: 1 | Status: SHIPPED | OUTPATIENT
Start: 2019-09-18 | End: 2020-03-13 | Stop reason: SDUPTHER

## 2019-10-01 ENCOUNTER — OFFICE VISIT (OUTPATIENT)
Dept: FAMILY MEDICINE CLINIC | Facility: MEDICAL CENTER | Age: 49
End: 2019-10-01
Payer: COMMERCIAL

## 2019-10-01 ENCOUNTER — TELEPHONE (OUTPATIENT)
Dept: FAMILY MEDICINE CLINIC | Facility: MEDICAL CENTER | Age: 49
End: 2019-10-01

## 2019-10-01 VITALS
HEART RATE: 76 BPM | DIASTOLIC BLOOD PRESSURE: 88 MMHG | WEIGHT: 240.38 LBS | BODY MASS INDEX: 32.6 KG/M2 | RESPIRATION RATE: 14 BRPM | SYSTOLIC BLOOD PRESSURE: 130 MMHG

## 2019-10-01 DIAGNOSIS — F43.23 ADJUSTMENT REACTION WITH ANXIETY AND DEPRESSION: ICD-10-CM

## 2019-10-01 DIAGNOSIS — M62.81 RIGHT-SIDED MUSCLE WEAKNESS: Primary | ICD-10-CM

## 2019-10-01 DIAGNOSIS — K21.9 GASTROESOPHAGEAL REFLUX DISEASE, ESOPHAGITIS PRESENCE NOT SPECIFIED: ICD-10-CM

## 2019-10-01 PROCEDURE — 99214 OFFICE O/P EST MOD 30 MIN: CPT | Performed by: FAMILY MEDICINE

## 2019-10-01 NOTE — TELEPHONE ENCOUNTER
Tiffanie Urrutia calling from [facility] stated will need a doctor to doctor referral as discussed at patient's visit today with Dr Maria Teresa Little

## 2019-10-01 NOTE — TELEPHONE ENCOUNTER
Doctor to Doctor referral is already on chart-it was placed today as an ASAP visit  Dr Amelia Mi is linked to the order  Please make them aware   If it is an insurance referral they may need, please forward to [facility]

## 2019-10-02 NOTE — PROGRESS NOTES
Patient is here today because he has noticed some leg weakness over the last couple of months  He has a very difficult time getting up from a crouching position and requires use of his hands to help him up  He has fallen on the stairs at work  He also complains of feeling numb around his right knee  There has been no recent history of illness, fevers, rash  There has been no history of injury  He has had no headache  He has no visual changes  He has no difficulty with talking or swallowing food  He did just get back from a golfing trip  As long as he is upright he is able to move around without significant difficulty  Past medical history, past surgical history, family medical history, social history, and medication list were all reviewed  /88 (Cuff Size: Large)   Pulse 76   Resp 14   Wt 109 kg (240 lb 6 oz)   BMI 32 60 kg/m²     HEENT examination is within normal limits no acute findings  Neck was supple  Chest clear  Cardiac exam revealed a regular rate and rhythm without murmur rub or gallop  Abdomen is soft and nontender  Cranial nerves are intact  There is no cerebellar dysfunction  Patient has 3/5 strength of the right lower and right upper extremities  Both distal and proximal     Deep tendon reflexes are absent in the upper and lower extremities on the right  He does have diminished sensation to light touch over the right shin and knee area  I am concerned about the possibility of a lower motor neuron process, verses other spinal cord lesion  I spoke with Dr Kirby Vail this morning and he has kindly agreed to see him Thursday morning

## 2019-10-03 ENCOUNTER — CONSULT (OUTPATIENT)
Dept: NEUROLOGY | Facility: CLINIC | Age: 49
End: 2019-10-03
Payer: COMMERCIAL

## 2019-10-03 VITALS
SYSTOLIC BLOOD PRESSURE: 124 MMHG | HEART RATE: 62 BPM | DIASTOLIC BLOOD PRESSURE: 88 MMHG | HEIGHT: 72 IN | WEIGHT: 235 LBS | BODY MASS INDEX: 31.83 KG/M2

## 2019-10-03 DIAGNOSIS — M54.16 RIGHT LUMBAR RADICULOPATHY: Primary | ICD-10-CM

## 2019-10-03 DIAGNOSIS — M62.81 MUSCLE WEAKNESS: ICD-10-CM

## 2019-10-03 PROCEDURE — 99244 OFF/OP CNSLTJ NEW/EST MOD 40: CPT | Performed by: PSYCHIATRY & NEUROLOGY

## 2019-10-03 RX ORDER — MELOXICAM 7.5 MG/1
TABLET ORAL
Qty: 30 TABLET | Refills: 3 | Status: SHIPPED | OUTPATIENT
Start: 2019-10-03 | End: 2020-04-07

## 2019-10-03 NOTE — PROGRESS NOTES
Mark Calvin is a 54 y o  male who presents with complaints of arm and leg weakness    Assessment:  1  Right lumbar radiculopathy    2  Muscle weakness        Plan:  MRI lumbar spine  EMG right upper/lower extremity  Physical therapy  Follow-up 6 weeks    Discussion:  Elda Wayne presents today with right lower extremity weakness associated with low back pain for the last 3 months  His weakness is in the distribution of the upper lumbar region L 2 3 which is significant associated with a significant reduced right patellar reflex suggestive of lumbar radiculopathy  Cannot exclude a lumbar plexopathy  He also reports some symptoms of weakness in the right arm, however I am unable to objectively identify any weakness or reflex asymmetry  Given his degree of weakness have recommended an MRI of the lumbar spine, EMG of the right upper and lower extremity and initiate physical therapy  Have recommended a trial of meloxicam and did discuss potential adverse effects including potential stomach irritation in combination with his Lexapro  He will take it with food and if he has stomach irritation will discontinue it  I will see him back in follow-up in 6 weeks  He will avoid activities that would increase fall risk      Subjective:    HPI  Elda Wayne is a right-handed man who presents with the above complaints  He states that since he was 17years old he has had intermittent symptoms of back pain  He states that typically with rest the symptoms would ease over  Of days  Occasionally the pain would radiate into the right lower extremity  He states that about 3 months ago he developed back pain however has not improved  He states at times it lateralizes more to the right otherwise it is across the lower back  He has not noticed any pain radiating to lower extremity  He states he has noticed some issues with weakness in the leg especially the proximal leg    He states he cannot get down into a catches position and get back up again  He has noted weakness going up and down steps and in fact states that 1 occasion he was going up steps holding things in his arms and his right leg buckled and he fell down some steps  He states he has noted some numbness in the anterior leg around the knee area but not distally or proximally  He states when he would have back problems in the past he did not notice any weakness in his legs  He denies any issues with bowel or bladder function  He states he is also noted some weakness in the right arm  He states that if he reaches to lift something it just does not feel normal strength  He has not noticed any pain in his neck or numbness or tingling in the arms  He has not noticed any issues with his left arm and leg  Past Medical History:   Diagnosis Date    Anxiety     OCD (obsessive compulsive disorder)     Umbilical hernia     last assessed 08/30/17       Family History:  Family History   Problem Relation Age of Onset    No Known Problems Mother     Other Father         adopted by father       Past Surgical History:  Past Surgical History:   Procedure Laterality Date    ARTHROSCOPY KNEE Left     [place] REPAIR UMBILICAL AYFY,4+T/O,NNJTC N/A 10/27/2017    Procedure: REPAIR HERNIA UMBILICAL with mesh;  Surgeon: Denisha Finnegan MD;  Location: [facility];  Service: General       Social History:   reports that he has never smoked  He has quit using smokeless tobacco  He reports that he drinks about 5 0 standard drinks of alcohol per week  He reports that he does not use drugs      Allergies:  Penicillin v      Current Outpatient Medications:     escitalopram (LEXAPRO) 10 mg tablet, TAKE 1 TABLET BY MOUTH EVERY DAY, Disp: 90 tablet, Rfl: 1    omeprazole (PriLOSEC) 40 MG capsule, Take 1 capsule (40 mg total) by mouth daily, Disp: 90 capsule, Rfl: 1    tadalafil (CIALIS) 5 MG tablet, TAKE 1 TABLET BY MOUTH EVERY DAY, Disp: 30 tablet, Rfl: 7    meloxicam (MOBIC) 7 5 mg tablet, One or 2 pills p o  Q day p r n  Pain, Disp: 30 tablet, Rfl: 3    I have reviewed the past medical, social and family history, current medications, allergies, vitals, review of systems and updated this information as appropriate today     Objective:    Vitals:  Blood pressure 124/88, pulse 62, height 6' (1 829 m), weight 107 kg (235 lb)  Physical Exam    Neurological Exam    GENERAL:  Cooperative in no acute distress  Well-developed and well-nourished    HEAD and NECK   Head is atraumatic normocephalic with no lesions or masses  Neck is supple with full range of motion    CARDIOVASCULAR  Carotid Arteries-no carotid bruits  MUSCULOSKELETAL:  Back is straight without tenderness to palpation  He is able to flex to touch to the mid shin region  Straight leg raising is negative bilaterally    NEUROLOGIC:  Mental Status-the patient is awake alert and oriented without aphasia or apraxia  Cranial Nerves: Visual fields are full to confrontation  Discs are flat  Extraocular movements are full without nystagmus  Pupils are 2-1/2 mm and reactive  Face is symmetrical to light touch  Movements of facial expression move symmetrically  Hearing is normal to finger rub bilaterally  Soft palate lifts symmetrically  Shoulder shrug is symmetrical  Tongue is midline without atrophy  Motor: No drift is noted on arm extension  Strength is full in the upper and lower extremities with normal bulk and tone, with exception of 4/5 strength in hip flexor and knee extensor on the right  He was able to toe and heel walk but had a great deal of difficulty stepping up on the stool with his right leg and no problems stepping up on the stool with his left leg     Sensory: Intact to temperature and vibratory sensation in the upper and lower extremities bilaterally with exception of some decreased temperature sensation around the right knee  Cortical function is intact  Coordination: Finger to nose testing is performed accurately  Romberg is negative   Gait reveals a normal base with symmetrical arm swing  Tandem walk is normal   Reflexes:  1+ in the biceps, triceps brachioradialis regions, trace at the right knee and 2 at the left knee, 1+ at the ankles  Toes are downgoing            ROS:    Review of Systems   Constitutional: Negative  Negative for appetite change and fever  HENT: Negative  Negative for drooling, hearing loss, tinnitus, trouble swallowing and voice change  Eyes: Negative  Negative for photophobia and pain  Respiratory: Positive for apnea  Negative for shortness of breath  Snores   Cardiovascular: Negative  Negative for palpitations  Gastrointestinal: Negative  Negative for nausea and vomiting  Endocrine: Negative  Negative for cold intolerance and heat intolerance  Genitourinary: Negative  Negative for dysuria, frequency and urgency  Musculoskeletal: Positive for back pain and neck pain  Negative for myalgias  Skin: Negative  Negative for rash  Neurological: Positive for weakness and numbness (right knee)  Negative for dizziness, tremors, seizures, syncope, facial asymmetry, speech difficulty, light-headedness and headaches  Hematological: Negative  Does not bruise/bleed easily  Psychiatric/Behavioral: Positive for sleep disturbance  Negative for confusion, decreased concentration and hallucinations
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
## 2019-10-11 ENCOUNTER — EVALUATION (OUTPATIENT)
Dept: PHYSICAL THERAPY | Facility: MEDICAL CENTER | Age: 49
End: 2019-10-11
Payer: COMMERCIAL

## 2019-10-11 ENCOUNTER — TELEPHONE (OUTPATIENT)
Dept: NEUROLOGY | Facility: CLINIC | Age: 49
End: 2019-10-11

## 2019-10-11 DIAGNOSIS — M54.16 RIGHT LUMBAR RADICULOPATHY: ICD-10-CM

## 2019-10-11 PROCEDURE — 97162 PT EVAL MOD COMPLEX 30 MIN: CPT | Performed by: PHYSICAL MEDICINE & REHABILITATION
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
## 2019-10-11 NOTE — TELEPHONE ENCOUNTER
Called patient to advise that we are unable to obtain auth for MRI L spine because the insurance company require the EMG results or the PT session completed

## 2019-10-15 ENCOUNTER — OFFICE VISIT (OUTPATIENT)
Dept: PHYSICAL THERAPY | Facility: MEDICAL CENTER | Age: 49
End: 2019-10-15
Payer: COMMERCIAL

## 2019-10-15 DIAGNOSIS — M54.16 RIGHT LUMBAR RADICULOPATHY: Primary | ICD-10-CM

## 2019-10-15 DIAGNOSIS — N52.9 ERECTILE DYSFUNCTION, UNSPECIFIED ERECTILE DYSFUNCTION TYPE: ICD-10-CM

## 2019-10-15 PROCEDURE — 97110 THERAPEUTIC EXERCISES: CPT

## 2019-10-15 NOTE — PROGRESS NOTES
Daily Note     Today's date: 10/15/2019  Patient name: Jenn Palacio  : 1970  MRN: [id number]  Referring provider: Lizzie Pino MD  Dx:   Encounter Diagnosis     ICD-10-CM    1  Right lumbar radiculopathy M54 16         1:1 with PTA CR 8:00- 8:30  Subjective: Pain has centralized since I E however patient reports increased back pain rated 4-5/10 since  Explained to patient that this is actually a positive response since he no longer is experiencing radicular symptoms down right LE  Objective: See treatment diary below  Assessment: Tolerated treatment fair  Pain with eccentric bridge; advised to maintain TA and glute activation throughout with min improvement  Attempted prone glute lift however unable to tolerate due to L-S pain  Patient exhibited good technique with therapeutic exercises and would benefit from continued PT  Plan: Continue per plan of care  Precautions: long h/o LBP      Manual  10/15                                          Exercise Diary  10/15            Prone lying 5 mins            ZAINA 2 mins            Prone press up 10"x10            Bridging 3"x20            Seated APT on pball             Prone hip ext, HS and glute emphasis Pain!             TB press outs RTB  2x10 ea             bike 5 mins                                                                                                                                                                            Modalities  10/15

## 2019-10-16 ENCOUNTER — APPOINTMENT (OUTPATIENT)
Dept: PHYSICAL THERAPY | Facility: MEDICAL CENTER | Age: 49
End: 2019-10-16
Payer: COMMERCIAL

## 2019-10-17 ENCOUNTER — OFFICE VISIT (OUTPATIENT)
Dept: SLEEP CENTER | Facility: CLINIC | Age: 49
End: 2019-10-17
Payer: COMMERCIAL

## 2019-10-17 VITALS
HEART RATE: 72 BPM | DIASTOLIC BLOOD PRESSURE: 70 MMHG | HEIGHT: 73 IN | WEIGHT: 239 LBS | BODY MASS INDEX: 31.68 KG/M2 | SYSTOLIC BLOOD PRESSURE: 110 MMHG

## 2019-10-17 DIAGNOSIS — R06.81 WITNESSED EPISODE OF APNEA: ICD-10-CM

## 2019-10-17 DIAGNOSIS — R06.89 GASPING FOR BREATH: ICD-10-CM

## 2019-10-17 DIAGNOSIS — G47.8 NON-RESTORATIVE SLEEP: ICD-10-CM

## 2019-10-17 DIAGNOSIS — G47.00 FREQUENT NOCTURNAL AWAKENING: ICD-10-CM

## 2019-10-17 DIAGNOSIS — R51.9 HEADACHE UPON AWAKENING: ICD-10-CM

## 2019-10-17 DIAGNOSIS — R06.83 SNORING: Primary | ICD-10-CM

## 2019-10-17 DIAGNOSIS — G47.30 SLEEP APNEA, UNSPECIFIED TYPE: ICD-10-CM

## 2019-10-17 PROCEDURE — 99244 OFF/OP CNSLTJ NEW/EST MOD 40: CPT | Performed by: PSYCHIATRY & NEUROLOGY

## 2019-10-17 RX ORDER — TADALAFIL 5 MG/1
TABLET ORAL
Qty: 30 TABLET | Refills: 7 | Status: SHIPPED | OUTPATIENT
Start: 2019-10-17 | End: 2020-06-02

## 2019-10-17 NOTE — PROGRESS NOTES
Sleep Medicine Consultation Note    HPI:  Mr Karyn Rico is a 54 y o  male seen at the request of Cata Wing MD for advice regarding suspected sleep disordered breathing  The patient stated that he wakes up numerous times a night with snoring, choking, gasping, and stopping breathing  This has been ongoing for years with a slowly worsening progression  He has gained about 10-15 lbs over three years  This is worse on his back and after drinking alcohol  He awakes at least 10 times a night with these episodes and then it takes him a little while to get back to sleep  He is tired during the day, not napping  He denied involuntary dozing  Please see below for continuation of the HPI:      Sleep Pattern:  -Location: bedroom  -Bed/Recliner/Wedge: bed  -Bed Partner: yes, but partner leaves at times due to snoring  -HOB: flat  -# of pillows under head: 1  -Position: back  -Bedtime: 10pm  -Lights out: same time  -Environmental: No lights/TV  -Latency: 15 mins  -Awakenings: 10   -Reason: choking, snoring, gasping   -Duration: within 30 mins  -Wake time: 7am   -Alarm: yes  -Rise time: same time  -Days off: 11-8am  -Shift Work: M-F day shift  -Patient's estimate of total sleep time: 6-7h    Daytime Symptoms:  -Upon Awakening: tired, headache  -Daytime fatigue/sleepiness: tired  -Naps: no  -Involuntary Dozing: no  -Cognitive Symptoms: if very tired, has trouble concentrating and easily distracted  -Driving: Difficulty with sleepiness and driving:  denied   -- Close calls related to sleepiness: denied   -- Accidents related to sleepiness: denied      Questionnaires:   Sitting and reading:  Moderate chance of dozing  Watching TV: High chance of dozing  Sitting, inactive in a public place (e g  a theatre or a meeting): Slight chance of dozing  As a passenger in a car for an hour without a break: Slight chance of dozing  Lying down to rest in the afternoon when circumstances permit: High chance of dozing  Sitting and talking to someone: Slight chance of dozing  Sitting quietly after a lunch without alcohol: Slight chance of dozing  In a car, while stopped for a few minutes in traffic: Slight chance of dozing  Total score: 13      Sleep Review of Symptoms:  -Parasomnias:  --Sleep Walking: no  --Dream Enactment: no  --Bruxism: yes  -Motor:  --RLS: no  --PLMS: no  -Narcolepsy:  --Hallucinations: no  --Paralysis: yes, a few times  --Cataplexy: no    Childhood Sleep History: denied    Prior Sleep Studies/Evaluations:  denied    Family History:  Family history of sleep disorders: adopted, so unknown    Patient Active Problem List   Diagnosis    Esophageal reflux    Erectile dysfunction of non-organic origin    Adjustment reaction with anxiety and depression     Past Medical History:   Diagnosis Date    Anxiety     OCD (obsessive compulsive disorder)     Umbilical hernia     last assessed 08/30/17       --> Denies any cardiopulmonary disease  --> Seizure hx: denies  --> Head injury with LOC: denies  --> Supplemental Oxygen Use: denies    Labs   Results for Edilia Ramey (MRN [id number]) as of 10/17/2019 08:13   Ref   Range 9/11/2019 07:58   Sodium Latest Ref Range: 136 - 145 mmol/L 140   Potassium Latest Ref Range: 3 5 - 5 3 mmol/L 4 1   Chloride Latest Ref Range: 100 - 108 mmol/L 108   CO2 Latest Ref Range: 21 - 32 mmol/L 27   Anion Gap Latest Ref Range: 4 - 13 mmol/L 5   BUN Latest Ref Range: 5 - 25 mg/dL 16   Creatinine Latest Ref Range: 0 60 - 1 30 mg/dL 1 17   GLUCOSE FASTING Latest Ref Range: 65 - 99 mg/dL 92   Calcium Latest Ref Range: 8 3 - 10 1 mg/dL 8 8   AST Latest Ref Range: 5 - 45 U/L 17   ALT Latest Ref Range: 12 - 78 U/L 38   Alkaline Phosphatase Latest Ref Range: 46 - 116 U/L 65   Total Protein Latest Ref Range: 6 4 - 8 2 g/dL 7 1   Albumin Latest Ref Range: 3 5 - 5 0 g/dL 3 9   TOTAL BILIRUBIN Latest Ref Range: 0 20 - 1 00 mg/dL 0 57   eGFR Latest Units: ml/min/1 73sq m 74   Cholesterol Latest Ref Range: 50 - 200 mg/dL 218 (H)   Triglycerides Latest Ref Range: <=150 mg/dL 261 (H)   HDL Latest Ref Range: 40 - 60 mg/dL 38 (L)   LDL Direct Latest Ref Range: 0 - 100 mg/dL 128 (H)   TSH 3RD GENERATON Latest Ref Range: 0 358 - 3 740 uIU/mL 1 780         Past Surgical History:   Procedure Laterality Date    ARTHROSCOPY KNEE Left     HI REPAIR UMBILICAL AOGG,0+R/O,WSDKA N/A 10/27/2017    Procedure: REPAIR HERNIA UMBILICAL with mesh;  Surgeon: Aysha Ryan MD;  Location: [facility];  Service: General       --> ENT procedures: denies    Current Outpatient Medications   Medication Sig Dispense Refill    escitalopram (LEXAPRO) 10 mg tablet TAKE 1 TABLET BY MOUTH EVERY DAY 90 tablet 1    meloxicam (MOBIC) 7 5 mg tablet One or 2 pills p o  Q day p r n  Pain 30 tablet 3    omeprazole (PriLOSEC) 40 MG capsule Take 1 capsule (40 mg total) by mouth daily 90 capsule 1    tadalafil (CIALIS) 5 MG tablet TAKE 1 TABLET BY MOUTH EVERY DAY 30 tablet 7     No current facility-administered medications for this visit  Social History:  -Employment:   -Smoking: no  -Caffeine: 16 oz a day  -Alcohol: 5 beers a week  -THC: no  -OTC/Supplements/herbals: no  -Illicits:  denies  -Family: lives with fiance    ROS:  Genitourinary none   Cardiology none   Gastrointestinal none   Neurology frequent headaches, muscle weakness, forgetfulness and poor concentration or confusion,    Constitutional fatigue and weight change   Integumentary none   Psychiatry anxiety   Musculoskeletal muscle aches and back pain   Pulmonary snoring   ENT none   Endocrine none   Hematological none       MSE:  -Alert and appropriate: alert, calm, cooperative  -Oriented to person, place and time:  name, age, location, day/date/mon/yr  -Behavior: good, sustained eye contact  -Speech: Unremarkable rate/rhythm/volume  -Mood: "sluggish"  -Affect: full range  -Thought Processes: linear, logical, goal directed  PE:  Body mass index is 31 53 kg/m²    Vitals: 10/17/19 0800   BP: 110/70   Pulse: 72   Weight: 108 kg (239 lb)   Height: 6' 1" (1 854 m)       -General:  In NAD    -Eyes: Conjunctival injection: none     -EOM:  PERRLA, EOMI   -Eyelid hooding: no    -ENT: MP: 4/4   -Facial deformity: no retrognathia   -Hard palate: moderate arch   -Soft palate: crowding with low set palate and enlarge elongated uvula   -Gums and teeth: normal dentition   -Tongue:  Scalloping   -Nares:  Patent    -Neck/Lymphatics: Lymphadenopathy:  none appreciated   -Masses:  none appreciated   -Circumference: Neck Circumference: 17 5 "    -Cardiac: Auscultation:  RRR   - LE edema over shins: none appreciated    -Pulm: -Respirations: unlaboured         -Auscultation:  CTA bilaterally, posterior fields    -Neuro: No resting tremor     -Musculoskeletal: Gait and stance: normal turning and ambulation; unremarkable  Assessment:  Mr Bo Crews is a 54 y o  male who is seen to evaluate for possible obstructive sleep apnea  Given the patient's symptoms of observed apneas, snoring, nocturnal gasping, daytime tiredness, non-restorative sleep, frequent nocturnal awakenings, and morning headaches, in the context of a narrow airway, a diagnosis of obstructive sleep apnea is very likely  The pathophysiology of, the reasons to treat and treatment options for obstructive sleep apnea were all reviewed with the patient today  Discussed the testing options and reviewed the benefits and downsides of both, patient opted for the Home Sleep Apnea Test  He is amenable to treatment with PAP therapy  Discussed keeping nasal passages clear, abstaining from alcohol, and other sedating drugs at night- which will worsen symptoms of LEANNE  --History provided by: patient   --Records reviewed: in chart      Recommendations:  1) HST with in lab PSG if non-diagnostic  2) Driving safety was reviewed with patient  If the patient feels too sleepy to drive he knows not to drive    If he becomes sleepy while driving he will pull over and nap  3) Follow-up after initiation of treatment if needed  4) Call with any questions or concerns  All questions answered for the patient, who indicated understanding and agreed with the plan       Dougie Humphries MD  Psychiatry/ Sleep medicine

## 2019-10-17 NOTE — LETTER
October 17, 2019     Hoyt Hammans, MD  [facility] [address]    Patient: Tenzin Alvarado   YOB: 1970   Date of Visit: 10/17/2019       Dear Dr Quinonez Records: Thank you for referring Tenzin Alvarado to me for evaluation  Below are my notes for this consultation  If you have questions, please do not hesitate to call me  I look forward to following your patient along with you  Sincerely,        Eddi Hodges MD        CC: No Recipients  Eddi Hodges MD  10/17/2019  8:37 AM  Sign at close encounter  Sleep Medicine Consultation Note    HPI:  Mr Tenzin Alvarado is a 54 y o  male seen at the request of Hoyt Hammans, MD for advice regarding suspected sleep disordered breathing  The patient stated that he wakes up numerous times a night with snoring, choking, gasping, and stopping breathing  This has been ongoing for years with a slowly worsening progression  He has gained about 10-15 lbs over three years  This is worse on his back and after drinking alcohol  He awakes at least 10 times a night with these episodes and then it takes him a little while to get back to sleep  He is tired during the day, not napping  He denied involuntary dozing       Please see below for continuation of the HPI:      Sleep Pattern:  -Location: bedroom  -Bed/Recliner/Wedge: bed  -Bed Partner: yes, but partner leaves at times due to snoring  -HOB: flat  -# of pillows under head: 1  -Position: back  -Bedtime: 10pm  -Lights out: same time  -Environmental: No lights/TV  -Latency: 15 mins  -Awakenings: 10   -Reason: choking, snoring, gasping   -Duration: within 30 mins  -Wake time: 7am   -Alarm: yes  -Rise time: same time  -Days off: 11-8am  -Shift Work: M-F day shift  -Patient's estimate of total sleep time: 6-7h    Daytime Symptoms:  -Upon Awakening: tired, headache  -Daytime fatigue/sleepiness: tired  -Naps: no  -Involuntary Dozing: no  -Cognitive Symptoms: if very tired, has trouble concentrating and easily distracted  -Driving: Difficulty with sleepiness and driving:  denied   -- Close calls related to sleepiness: denied   -- Accidents related to sleepiness: denied      Questionnaires:   Sitting and reading: Moderate chance of dozing  Watching TV: High chance of dozing  Sitting, inactive in a public place (e g  a theatre or a meeting): Slight chance of dozing  As a passenger in a car for an hour without a break: Slight chance of dozing  Lying down to rest in the afternoon when circumstances permit: High chance of dozing  Sitting and talking to someone: Slight chance of dozing  Sitting quietly after a lunch without alcohol: Slight chance of dozing  In a car, while stopped for a few minutes in traffic: Slight chance of dozing  Total score: 13      Sleep Review of Symptoms:  -Parasomnias:  --Sleep Walking: no  --Dream Enactment: no  --Bruxism: yes  -Motor:  --RLS: no  --PLMS: no  -Narcolepsy:  --Hallucinations: no  --Paralysis: yes, a few times  --Cataplexy: no    Childhood Sleep History: denied    Prior Sleep Studies/Evaluations:  denied    Family History:  Family history of sleep disorders: adopted, so unknown    Patient Active Problem List   Diagnosis    Esophageal reflux    Erectile dysfunction of non-organic origin    Adjustment reaction with anxiety and depression     Past Medical History:   Diagnosis Date    Anxiety     OCD (obsessive compulsive disorder)     Umbilical hernia     last assessed 08/30/17       --> Denies any cardiopulmonary disease  --> Seizure hx: denies  --> Head injury with LOC: denies  --> Supplemental Oxygen Use: denies    Labs   Results for Radha Keane (MRN [id number]) as of 10/17/2019 08:13   Ref   Range 9/11/2019 07:58   Sodium Latest Ref Range: 136 - 145 mmol/L 140   Potassium Latest Ref Range: 3 5 - 5 3 mmol/L 4 1   Chloride Latest Ref Range: 100 - 108 mmol/L 108   CO2 Latest Ref Range: 21 - 32 mmol/L 27   Anion Gap Latest Ref Range: 4 - 13 mmol/L 5   BUN Latest Ref Range: 5 - 25 mg/dL 16   Creatinine Latest Ref Range: 0 60 - 1 30 mg/dL 1 17   GLUCOSE FASTING Latest Ref Range: 65 - 99 mg/dL 92   Calcium Latest Ref Range: 8 3 - 10 1 mg/dL 8 8   AST Latest Ref Range: 5 - 45 U/L 17   ALT Latest Ref Range: 12 - 78 U/L 38   Alkaline Phosphatase Latest Ref Range: 46 - 116 U/L 65   Total Protein Latest Ref Range: 6 4 - 8 2 g/dL 7 1   Albumin Latest Ref Range: 3 5 - 5 0 g/dL 3 9   TOTAL BILIRUBIN Latest Ref Range: 0 20 - 1 00 mg/dL 0 57   eGFR Latest Units: ml/min/1 73sq m 74   Cholesterol Latest Ref Range: 50 - 200 mg/dL 218 (H)   Triglycerides Latest Ref Range: <=150 mg/dL 261 (H)   HDL Latest Ref Range: 40 - 60 mg/dL 38 (L)   LDL Direct Latest Ref Range: 0 - 100 mg/dL 128 (H)   TSH 3RD GENERATON Latest Ref Range: 0 358 - 3 740 uIU/mL 1 780         Past Surgical History:   Procedure Laterality Date    ARTHROSCOPY KNEE Left     LA REPAIR UMBILICAL GUPK,1+T/F,ULVBU N/A 10/27/2017    Procedure: REPAIR HERNIA UMBILICAL with mesh;  Surgeon: Teena Leiva MD;  Location: [facility];  Service: General       --> ENT procedures: denies    Current Outpatient Medications   Medication Sig Dispense Refill    escitalopram (LEXAPRO) 10 mg tablet TAKE 1 TABLET BY MOUTH EVERY DAY 90 tablet 1    meloxicam (MOBIC) 7 5 mg tablet One or 2 pills p o  Q day p r n  Pain 30 tablet 3    omeprazole (PriLOSEC) 40 MG capsule Take 1 capsule (40 mg total) by mouth daily 90 capsule 1    tadalafil (CIALIS) 5 MG tablet TAKE 1 TABLET BY MOUTH EVERY DAY 30 tablet 7     No current facility-administered medications for this visit          Social History:  -Employment:   -Smoking: no  -Caffeine: 16 oz a day  -Alcohol: 5 beers a week  -THC: no  -OTC/Supplements/herbals: no  -Illicits:  denies  -Family: lives with fiance    ROS:  Genitourinary none   Cardiology none   Gastrointestinal none   Neurology frequent headaches, muscle weakness, forgetfulness and poor concentration or confusion, Constitutional fatigue and weight change   Integumentary none   Psychiatry anxiety   Musculoskeletal muscle aches and back pain   Pulmonary snoring   ENT none   Endocrine none   Hematological none       MSE:  -Alert and appropriate: alert, calm, cooperative  -Oriented to person, place and time:  name, age, location, day/date/mon/yr  -Behavior: good, sustained eye contact  -Speech: Unremarkable rate/rhythm/volume  -Mood: "sluggish"  -Affect: full range  -Thought Processes: linear, logical, goal directed  PE:  Body mass index is 31 53 kg/m²  Vitals:    10/17/19 0800   BP: 110/70   Pulse: 72   Weight: 108 kg (239 lb)   Height: 6' 1" (1 854 m)       -General:  In NAD    -Eyes: Conjunctival injection: none     -EOM:  PERRLA, EOMI   -Eyelid hooding: no    -ENT: MP: 4/4   -Facial deformity: no retrognathia   -Hard palate: moderate arch   -Soft palate: crowding with low set palate and enlarge elongated uvula   -Gums and teeth: normal dentition   -Tongue:  Scalloping   -Nares:  Patent    -Neck/Lymphatics: Lymphadenopathy:  none appreciated   -Masses:  none appreciated   -Circumference: Neck Circumference: 17 5 "    -Cardiac: Auscultation:  RRR   - LE edema over shins: none appreciated    -Pulm: -Respirations: unlaboured         -Auscultation:  CTA bilaterally, posterior fields    -Neuro: No resting tremor     -Musculoskeletal: Gait and stance: normal turning and ambulation; unremarkable  Assessment:  Mr Tamra Blanchard is a 54 y o  male who is seen to evaluate for possible obstructive sleep apnea  Given the patient's symptoms of observed apneas, snoring, nocturnal gasping, daytime tiredness, non-restorative sleep, frequent nocturnal awakenings, and morning headaches, in the context of a narrow airway, a diagnosis of obstructive sleep apnea is very likely  The pathophysiology of, the reasons to treat and treatment options for obstructive sleep apnea were all reviewed with the patient today    Discussed the testing options and reviewed the benefits and downsides of both, patient opted for the Home Sleep Apnea Test  He is amenable to treatment with PAP therapy  Discussed keeping nasal passages clear, abstaining from alcohol, and other sedating drugs at night- which will worsen symptoms of LEANNE  --History provided by: patient   --Records reviewed: in chart      Recommendations:  1) HST with in lab PSG if non-diagnostic  2) Driving safety was reviewed with patient  If the patient feels too sleepy to drive he knows not to drive  If he becomes sleepy while driving he will pull over and nap  3) Follow-up after initiation of treatment if needed  4) Call with any questions or concerns  All questions answered for the patient, who indicated understanding and agreed with the plan       aMrk Dubon MD  Psychiatry/ Sleep medicine

## 2019-10-17 NOTE — PATIENT INSTRUCTIONS
Recommendations:  1) HST with in lab PSG if non-diagnostic  2) Driving safety was reviewed with patient  If the patient feels too sleepy to drive he knows not to drive  If he becomes sleepy while driving he will pull over and nap  3) Follow-up after initiation of treatment if needed  4) Call with any questions or concerns

## 2019-10-18 ENCOUNTER — TELEPHONE (OUTPATIENT)
Dept: NEUROLOGY | Facility: CLINIC | Age: 49
End: 2019-10-18

## 2019-10-18 DIAGNOSIS — M54.16 RIGHT LUMBAR RADICULOPATHY: Primary | ICD-10-CM

## 2019-10-18 RX ORDER — GABAPENTIN 100 MG/1
CAPSULE ORAL
Qty: 100 CAPSULE | Refills: 5 | Status: SHIPPED | OUTPATIENT
Start: 2019-10-18 | End: 2019-12-17

## 2019-10-18 NOTE — TELEPHONE ENCOUNTER
Spoke with patient who reports shooting pains down the leg  No change in weakness  Have recommended initiating gabapentin  I did speak with MRI to facilitate moving up his appointment  If apparently there is a possible issue with approval from his insurance and I informed that if there is a need for me to speak from a peer to peer standpoint will be happy to do so    I did prescribe gabapentin in hopes to relieve some of his radicular pain symptoms starting at 100 mg 3 times daily titrating up to 300 if necessary

## 2019-10-18 NOTE — TELEPHONE ENCOUNTER
Patient called to update on symptoms  He states back pain is progressively worsening and he is now getting "shooters" down his legs  Patient states his MRI is now scheduled for 10/24/19  (not sure if he needs prior authorization)  He is currently in physical therapy and hoping to be able to complete MRI with all the pain he is having  Any further recommendations in the meantime?

## 2019-10-21 ENCOUNTER — OFFICE VISIT (OUTPATIENT)
Dept: PHYSICAL THERAPY | Facility: MEDICAL CENTER | Age: 49
End: 2019-10-21
Payer: COMMERCIAL

## 2019-10-21 DIAGNOSIS — M54.16 RIGHT LUMBAR RADICULOPATHY: Primary | ICD-10-CM

## 2019-10-21 PROCEDURE — 97140 MANUAL THERAPY 1/> REGIONS: CPT | Performed by: PHYSICAL THERAPIST

## 2019-10-21 PROCEDURE — 97012 MECHANICAL TRACTION THERAPY: CPT | Performed by: PHYSICAL THERAPIST

## 2019-10-21 NOTE — PROGRESS NOTES
Daily Note     Today's date: 10/21/2019  Patient name: Leann Oakes  : 1970  MRN: [id number]  Referring provider: Rome Bellamy MD  Dx:   Encounter Diagnosis     ICD-10-CM    1  Right lumbar radiculopathy M54 16         1:1 with - 630p          Subjective: Pt noting increase in neck and low back pain  Pt noting increased weakness in the R LE d/t back pain  Pt noting (+) centralization with prone press-ups, however shooting pain into both legs from the low back  Pt finding some relief with sleeping in prone, but wakes with pain  Objective: See treatment diary below  Lumbar flexion: 25*  Extension: 10*  SB, Rot: minimal all planes    Seated R hip flexion: 3/5, knee extension    Altered sensation R anterior thigh, "totally dead" from knee down    DTR: L4 R - 1+ trace B;     Assessment: Pt reporting regression in status since starting physical therapy, pt denies contraindications to trailing mechanical traction  Pt with good tolerance, abolished pain and radicular symptoms with this trial, initially starting at 65#, but increased to 70# with good sonja after 5'  Pt to have MRI thurs  F/u with PT and MD on Friday  Will continue with traction if indicated  Once good pain management PT to progress to stability and mobility exs as appropriate  Plan: Continue per plan of care  Precautions: long h/o LBP      Manual  10/15                                          Exercise Diary  10/15 10/21           Prone lying 5 mins 5 mins           ZAINA 2 mins 3'           Prone press up 10"x10 x3           Bridging 3"x20 p! Seated APT on pball  np           Prone hip ext, HS and glute emphasis Pain! np           TB press outs RTB  2x10 ea  np           bike 5 mins np                                                                                                                                                                           Modalities  10/15 10/21           Mechanical txn  70#, prog  Static6 step; 12 mins, supine             CP hookyling to end x 15

## 2019-10-23 ENCOUNTER — TELEPHONE (OUTPATIENT)
Dept: NEUROLOGY | Facility: CLINIC | Age: 49
End: 2019-10-23

## 2019-10-23 NOTE — TELEPHONE ENCOUNTER
Pre encounter called regarding auth for patient  I advised that I called patient on 10/11 advising that we require EMG results prior to getting an auth for patient  They will be cancelling his appt tomorrow  LMOM for patient advising to please reschedule his MRI on a later date

## 2019-10-25 ENCOUNTER — PROCEDURE VISIT (OUTPATIENT)
Dept: NEUROLOGY | Facility: CLINIC | Age: 49
End: 2019-10-25
Payer: COMMERCIAL

## 2019-10-25 ENCOUNTER — OFFICE VISIT (OUTPATIENT)
Dept: PHYSICAL THERAPY | Facility: MEDICAL CENTER | Age: 49
End: 2019-10-25
Payer: COMMERCIAL

## 2019-10-25 DIAGNOSIS — M54.16 RIGHT LUMBAR RADICULOPATHY: Primary | ICD-10-CM

## 2019-10-25 DIAGNOSIS — M54.16 RIGHT LUMBAR RADICULOPATHY: ICD-10-CM

## 2019-10-25 DIAGNOSIS — M62.81 MUSCLE WEAKNESS: ICD-10-CM

## 2019-10-25 PROCEDURE — 95886 MUSC TEST DONE W/N TEST COMP: CPT | Performed by: PSYCHIATRY & NEUROLOGY

## 2019-10-25 PROCEDURE — 97012 MECHANICAL TRACTION THERAPY: CPT | Performed by: PHYSICAL THERAPIST

## 2019-10-25 PROCEDURE — 95911 NRV CNDJ TEST 9-10 STUDIES: CPT | Performed by: PSYCHIATRY & NEUROLOGY

## 2019-10-25 NOTE — PROGRESS NOTES
Daily Note     Today's date: 10/25/2019  Patient name: Abigail Putnam  : 1970  MRN: [id number]  Referring provider: Gladys Hu MD  Dx:   Encounter Diagnosis     ICD-10-CM    1  Right lumbar radiculopathy M54 16         1:1 with PT 7-102            Subjective: Pt finding good pain relief with traction and hasn't had pain until yesterday since last treatment  Pt with 6-7/10 pain, less pain through LE,       Objective: See treatment diary below  Assessment: Pt with (+) centralization with mechanical traction, pt going for EMG this morning and requeting to hold on exs  PT to add lumbar stabilization if better tolerated next session  Plan: Continue per plan of care  Precautions: long h/o LBP      Manual  10/15                                          Exercise Diary  10/15 10/21 10/24         Prone lying 5 mins 5 mins np         ZAINA 2 mins 3' np         Prone press up 10"x10 x3 np         Bridging 3"x20 p! np         Seated APT on pball  np np         Prone hip ext, HS and glute emphasis Pain! np np         TB press outs RTB  2x10 ea  np np         bike 5 mins np np                                                                                                                                                             Modalities  10/15 10/21 10/24          Mechanical txn  70#, prog  Static6 step; 12 mins, supine 70#, prog   Static6 step; 12 mins, supine            CP hookyling to end x 15 CP hookyling to end x 15

## 2019-10-25 NOTE — PROGRESS NOTES
EMG 1 Upper/1 Lower Neuropathy     Date/Time 10/25/2019 10:15 AM     Performed by  Lamonte Palacio MD     Authorized by Lamonte Palacio MD            EMG RIGHT/UPPER/LOWER EXTREMITY    Motor and sensory conduction studies were performed on the right median, radial, ulnar, peroneal, tibial and sural nerves  The distal motor latencies were normal  The motor action potential amplitudes were normal  Motor conduction velocities were normal including conduction velocity of the ulnar nerve across the elbow and peroneal nerve across the fibular head  F waves were normal     The right median, radial, ulnar and sural distal sensory latencies were normal with normal palmar conduction with median stimulation  Action potential amplitudes were normal     H  reflexes were symmetrically prolonged at 34 9 and 35 5 of the right left respectively  Concentric needle EMG was performed in the right APB, FDI, EDC, brachial radialis, biceps, triceps, EDB, tibialis anterior, gastrocnemius medius, vastus lateralis, biceps femoralis short head in the  low cervical and lumbar paraspinal regions  There were positive waves and fibrillation potentials noted in the right quadriceps region  No other spontaneous activities were seen  Rapid firing potentials with reduced interference patterns were noted in the quadriceps region  The other compound motor unit potentials were of normal configuration and interference patterns were full were full for effort  IMPRESSION: This is an abnormal EMG of the right upper and lower extremity due to denervation changes in the L2-3 myotomal distribution consistent with acute lumbar radiculopathy      DONITA Sky

## 2019-10-28 ENCOUNTER — OFFICE VISIT (OUTPATIENT)
Dept: PHYSICAL THERAPY | Facility: MEDICAL CENTER | Age: 49
End: 2019-10-28
Payer: COMMERCIAL

## 2019-10-28 DIAGNOSIS — M54.16 RIGHT LUMBAR RADICULOPATHY: Primary | ICD-10-CM

## 2019-10-28 PROCEDURE — 97140 MANUAL THERAPY 1/> REGIONS: CPT | Performed by: PHYSICAL THERAPIST

## 2019-10-28 PROCEDURE — 97112 NEUROMUSCULAR REEDUCATION: CPT | Performed by: PHYSICAL THERAPIST

## 2019-10-28 PROCEDURE — 97012 MECHANICAL TRACTION THERAPY: CPT | Performed by: PHYSICAL THERAPIST

## 2019-10-28 NOTE — PROGRESS NOTES
Daily Note     Today's date: 10/28/2019  Patient name: Dannielle Denis  : 1970  MRN: [id number]  Referring provider: Neva Hoskins MD  Dx:   Encounter Diagnosis     ICD-10-CM    1  Right lumbar radiculopathy M54 16         1:1 with PT             Subjective: Pt finding good pain relief with traction and hasn't had pain until yesterday since last treatment  Pt with 6-7/10 pain, less pain through LE,       Objective: See treatment diary below  Assessment: No pain with provided exercises although some intermittent spasms with prolonged static positioning affecting R>L, but both LEs  NVC testing complete indicating L2-3 radic  Plan: Continue per plan of care  Precautions: long h/o LBP      Manual  10/15 10/28           Nerve glides  sciatic 5sx10                            Exercise Diary  10/15 10/21 10/24 10/28        Prone lying 5 mins 5 mins np 2x2'        ZAINA 2 mins 3' np 2x2'        Prone press up 10"x10 x3 np np        Bridging 3"x20 p! np np        Seated APT on pball  np np         Prone hip ext, HS and glute emphasis Pain! np np Glute set +TAB 5" x 20        TB press outs RTB  2x10 ea  np np         bike 5 mins np np         TAB + ball squeeze    5" x 20        TAB+ ER    5" x 20                                                                                                                                    Modalities  10/15 10/21 10/24 10/28        Mechanical txn  70#, prog  Static6 step; 12 mins, supine 70#, prog   Static6 step; 12 mins, supine 77# prog static, 3 steps 12'; supine          CP hookyling to end x 15 CP hookyling to end x 15    CP @ home

## 2019-11-01 ENCOUNTER — OFFICE VISIT (OUTPATIENT)
Dept: PHYSICAL THERAPY | Facility: MEDICAL CENTER | Age: 49
End: 2019-11-01
Payer: COMMERCIAL

## 2019-11-01 DIAGNOSIS — M54.16 RIGHT LUMBAR RADICULOPATHY: Primary | ICD-10-CM

## 2019-11-01 PROCEDURE — 97012 MECHANICAL TRACTION THERAPY: CPT | Performed by: PHYSICAL THERAPIST

## 2019-11-01 PROCEDURE — 97112 NEUROMUSCULAR REEDUCATION: CPT | Performed by: PHYSICAL THERAPIST

## 2019-11-01 NOTE — PROGRESS NOTES
Daily Note     Today's date: 2019  Patient name: Myesha Ball  : 1970  MRN: [id number]  Referring provider: Jayme Albright MD  Dx:   Encounter Diagnosis     ICD-10-CM    1  Right lumbar radiculopathy M54 16         1:1 with -670            Subjective: Pt finding good pain relief with traction, but 1-3 days max  Pt continues to experience more LBP, but does still have radicular symptoms daily  Pt noting today R anterior thigh, L lateral hip      Objective: See treatment diary below  Assessment: Pt to have MRI early next week  Fair tolerance to provided exercises, pain with bridging and full press-ups  Pt still doing well with mechanical traction although short-term pain relief  Plan: Continue per plan of care  Precautions: long h/o LBP      Manual  10/15 10/28 11/1          Nerve glides  sciatic 5sx10                            Exercise Diary  10/15 10/21 10/24 10/28 11/1      Prone lying 5 mins 5 mins np 2x2' -      ZAINA 2 mins 3' np 2x2' 5''      Prone press up 10"x10 x3 np np p! Bridging 3"x20 p! np np p! Seated APT on pball  np np        Prone hip ext, HS and glute emphasis Pain! np np Glute set +TAB 5" x 20 Glute set +TAB 5" x 20      TB press outs RTB  2x10 ea  np np  Standing p ball press on table 2x10 5"      bike 5 mins np np        TAB + ball squeeze    5" x 20 5" x 20      TAB+ ER    5" x 20 5" x 20                                                                                                                        Modalities  10/15 10/21 10/24 10/28 11/1       Mechanical txn  70#, prog  Static6 step; 12 mins, supine 70#, prog   Static6 step; 12 mins, supine 77# prog static, 3 steps 12'; supine 80# prog static, 3 steps 12'; supine         CP hookyling to end x 15 CP hookyling to end x 15    CP @ home

## 2019-11-04 ENCOUNTER — OFFICE VISIT (OUTPATIENT)
Dept: PHYSICAL THERAPY | Facility: MEDICAL CENTER | Age: 49
End: 2019-11-04
Payer: COMMERCIAL

## 2019-11-04 DIAGNOSIS — M54.16 RIGHT LUMBAR RADICULOPATHY: Primary | ICD-10-CM

## 2019-11-04 PROCEDURE — 97110 THERAPEUTIC EXERCISES: CPT | Performed by: PHYSICAL THERAPIST

## 2019-11-04 PROCEDURE — 97012 MECHANICAL TRACTION THERAPY: CPT | Performed by: PHYSICAL THERAPIST

## 2019-11-04 PROCEDURE — 97112 NEUROMUSCULAR REEDUCATION: CPT | Performed by: PHYSICAL THERAPIST

## 2019-11-05 ENCOUNTER — HOSPITAL ENCOUNTER (OUTPATIENT)
Dept: MRI IMAGING | Facility: CLINIC | Age: 49
Discharge: HOME | End: 2019-11-05
Payer: COMMERCIAL

## 2019-11-05 DIAGNOSIS — M62.81 MUSCLE WEAKNESS: ICD-10-CM

## 2019-11-05 DIAGNOSIS — M54.16 RIGHT LUMBAR RADICULOPATHY: ICD-10-CM

## 2019-11-05 PROCEDURE — 72148 MRI LUMBAR SPINE W/O DYE: CPT

## 2019-11-05 NOTE — PROGRESS NOTES
Daily Note     Today's date: 2019  Patient name: Nacho Brown  : 1970  MRN: [id number]  Referring provider: Stewart Levine MD  Dx:   Encounter Diagnosis     ICD-10-CM    1  Right lumbar radiculopathy M54 16         1:1 with -521q            Subjective: Pt with pain down both legs today  "it was a rough day"  Pt attempting to perform HEP at home, but does continue to get back spasms with prone props  MRI tomorrow  Objective: See treatment diary below  Assessment: Pt able to perform core stability exercises without c/o pain this visit  No pain with nerve glides and good tolerance to traction  Some pain with stepping forward with R LE and reaching for belt after session, but markedly unchanged from arrival         Plan: Continue per plan of care  F/u with pt regarding MRI/MD f/u  Precautions: long h/o LBP      Manual  10/15 10/28 11/1          Nerve glides  sciatic 5sx10                            Exercise Diary  10/15 10/21 10/24 10/28 11/1 11/4     Prone lying 5 mins 5 mins np 2x2' - -     ZAINA 2 mins 3' np 2x2' 5'' 2'x2     Prone press up 10"x10 x3 np np p! -     Bridging 3"x20 p! np np p! 10     Prone hip ext, HS and glute emphasis Pain! np np Glute set +TAB 5" x 20 Glute set +TAB 5" x 20 Glute set +TAB 5" x 20     TB press outs RTB  2x10 ea  np np  Standing p ball press on table 2x10 5" np     bike 5 mins np np        TAB + ball squeeze    5" x 20 5" x 20 5" x 20     TAB+ ER    5" x 20 5" x 20 5" x 20     Prone hip ext      x10 B                                                                                                            Modalities  10/15 10/21 10/24 10/28 11/1       Mechanical txn  70#, prog  Static6 step; 12 mins, supine 70#, prog   Static6 step; 12 mins, supine 77# prog static, 3 steps 12'; supine 80# prog static, 3 steps 12'; supine         CP hookyling to end x 15 CP hookyling to end x 15    CP @ home

## 2019-11-08 ENCOUNTER — OFFICE VISIT (OUTPATIENT)
Dept: PHYSICAL THERAPY | Facility: MEDICAL CENTER | Age: 49
End: 2019-11-08
Payer: COMMERCIAL

## 2019-11-08 DIAGNOSIS — M54.16 RIGHT LUMBAR RADICULOPATHY: Primary | ICD-10-CM

## 2019-11-08 PROCEDURE — 97110 THERAPEUTIC EXERCISES: CPT | Performed by: PHYSICAL THERAPIST

## 2019-11-08 PROCEDURE — 97112 NEUROMUSCULAR REEDUCATION: CPT | Performed by: PHYSICAL THERAPIST

## 2019-11-08 PROCEDURE — 97012 MECHANICAL TRACTION THERAPY: CPT | Performed by: PHYSICAL THERAPIST

## 2019-11-08 NOTE — PROGRESS NOTES
Daily Note     Today's date: 2019  Patient name: Mark Calvin  : 1970  MRN: [id number]  Referring provider: Little Garcia MD  Dx:   Encounter Diagnosis     ICD-10-CM    1  Right lumbar radiculopathy M54 16        Start Time: 0750  Stop Time: 0830  Total time in clinic (min): 40 minutes    Subjective:  Patient reports that he fell again the other day secondary to radicular sx  He also experienced a severe spasm yesterday  Patient has not yet had MRI results reviewed with him but plans on calling later today  Objective: See treatment diary below      Assessment: Fair tolerance to PT with increased recovery periods and slow transitions required to avoid onset of spasm  Patient reported some relief following traction  Plan: Continue per plan of care  Precautions: long h/o LBP      Manual  10/15 10/28 11/1          Nerve glides  sciatic 5sx10                            Exercise Diary  10/15 10/21 10/24 10/28 11/1 11/4 11/8    Prone lying 5 mins 5 mins np 2x2' - -     ZAINA 2 mins 3' np 2x2' 5'' 2'x2 2'x2    Prone press up 10"x10 x3 np np p! -     Bridging 3"x20 p! np np p! 10 p! Prone hip ext, HS and glute emphasis Pain! np np Glute set +TAB 5" x 20 Glute set +TAB 5" x 20 Glute set +TAB 5" x 20     TB press outs RTB  2x10 ea  np np  Standing p ball press on table 2x10 5" np np    bike 5 mins np np        TAB + ball squeeze    5" x 20 5" x 20 5" x 20 5"x20    TAB+ ER    5" x 20 5" x 20 5" x 20     Prone hip ext      x10 B 10x     Nerve glides       10x ea                                                                                                Modalities  10/15 10/21 10/24 10/28 11/1 11/8      Mechanical txn  70#, prog  Static6 step; 12 mins, supine 70#, prog   Static6 step; 12 mins, supine 77# prog static, 3 steps 12'; supine 80# prog static, 3 steps 12'; supine 80# prog static, 3 steps 12'; supine        CP hookyling to end x 15 CP hookyling to end x 15    CP @ home

## 2019-11-11 ENCOUNTER — OFFICE VISIT (OUTPATIENT)
Dept: PHYSICAL THERAPY | Facility: MEDICAL CENTER | Age: 49
End: 2019-11-11
Payer: COMMERCIAL

## 2019-11-11 DIAGNOSIS — M54.16 RIGHT LUMBAR RADICULOPATHY: Primary | ICD-10-CM

## 2019-11-11 DIAGNOSIS — M54.16 RADICULOPATHY, LUMBAR REGION: Primary | ICD-10-CM

## 2019-11-11 PROCEDURE — 97012 MECHANICAL TRACTION THERAPY: CPT | Performed by: PHYSICAL THERAPIST

## 2019-11-11 PROCEDURE — 97110 THERAPEUTIC EXERCISES: CPT | Performed by: PHYSICAL THERAPIST

## 2019-11-11 NOTE — PROGRESS NOTES
Daily Note     Today's date: 2019  Patient name: Adalid Moscoso  : 1970  MRN: [id number]  Referring provider: Sybil Crowe MD  Dx:   Encounter Diagnosis     ICD-10-CM    1  Right lumbar radiculopathy M54 16                   Subjective:  Pt reporting MRI results are in, MD on call reporting non-operable and to stick with PT and possibly go to pain management  Pt hesitant to go through pain management for treatment  Objective: See treatment diary below      Assessment: pt stating he has been performing his exercise program at home and throughout the day today without change in relief  Pt noting "burning" sensation at site of L L3-4 and R LE pain down medial aspect of leg  Pt reporting this is inconsistent  Pt only finding relief with mechanical traction at this time  Plan: Continue per plan of care  Precautions: long h/o LBP    PT 1:1 535-610  Manual  10/15 10/28 11/1          Nerve glides  sciatic 5sx10                            Exercise Diary  10/15 10/21 10/24 10/28 11/1 11/4 11/8 11/11   Prone lying 5 mins 5 mins np 2x2' - -  Home today   ZAINA 2 mins 3' np 2x2' 5'' 2'x2 2'x2    Prone press up 10"x10 x3 np np p! -     Bridging 3"x20 p! np np p! 10 p! Prone hip ext, HS and glute emphasis Pain! np np Glute set +TAB 5" x 20 Glute set +TAB 5" x 20 Glute set +TAB 5" x 20     TB press outs RTB  2x10 ea  np np  Standing p ball press on table 2x10 5" np np    bike 5 mins np np        TAB + ball squeeze    5" x 20 5" x 20 5" x 20 5"x20    TAB+ ER    5" x 20 5" x 20 5" x 20     Prone hip ext      x10 B 10x     Nerve glides       10x ea    Pt education        Treatment options, pain managemnet, POC                                                                                    Modalities  10/15 10/21 10/24 10/28 11/1 11/8 11/11     Mechanical txn  70#, prog  Static6 step; 12 mins, supine 70#, prog   Static6 step; 12 mins, supine 77# prog static, 3 steps 12'; supine 80# prog static, 3 steps 12'; supine 80# prog static, 3 steps 12'; supine 80# prog static, 6 steps 15'; supine       CP hookyling to end x 15 CP hookyling to end x 15    CP @ home

## 2019-11-15 ENCOUNTER — OFFICE VISIT (OUTPATIENT)
Dept: PHYSICAL THERAPY | Facility: MEDICAL CENTER | Age: 49
End: 2019-11-15
Payer: COMMERCIAL

## 2019-11-15 DIAGNOSIS — M54.16 RIGHT LUMBAR RADICULOPATHY: Primary | ICD-10-CM

## 2019-11-15 PROCEDURE — 97140 MANUAL THERAPY 1/> REGIONS: CPT | Performed by: PHYSICAL THERAPIST

## 2019-11-15 PROCEDURE — 97012 MECHANICAL TRACTION THERAPY: CPT | Performed by: PHYSICAL THERAPIST

## 2019-11-15 NOTE — PROGRESS NOTES
PT Re-Evaluation     Today's date: 11/15/2019  Patient name: Kt Gonzalez  : 1970  MRN: [id number]  Referring provider: Claire Wallis MD  Dx:   Encounter Diagnosis     ICD-10-CM    1  Right lumbar radiculopathy M54 16                   Assessment  Assessment details: Pt has attended 10 PT visits at this time noting some good relief, (+) centralization with mechanical traction for short-term relief  Pt stating he hasn't had nerve pain down the leg for about 5 days, but this is inconsistent  Pt stating he is compliant and (I) with his HEP  Improvements in functional activity tolerance, ease of mobility at home  Pt would benefit from continued PT for pain relief  Pt also wishes to consider acupuncture for additional pain relief  PT to progress HEP as tolerated next treatment  Impairments: abnormal coordination, abnormal muscle firing, abnormal or restricted ROM, activity intolerance, impaired physical strength, lacks appropriate home exercise program and pain with function  Functional limitations: stair negotiation R knee, prolonged standing, sitting, frequent changes in position, poor sleepUnderstanding of Dx/Px/POC: good   Prognosis: good    Goals  ST  Patient will report 25% decrease in pain in 4 weeks  -MET  2  Patient will demonstrate 25% improvement in ROM in 4 weeks  -MET  3  Patient will demonstrate 1/2 grade improvement in strength in 4 weeks  -MET    LT  Patient will be able to perform IADLS without restriction or pain by discharge  - not met  2  Patient will be independent in HEP by discharge  -MET  3  Patient will be able to return to recreational/work duties without restriction or pain by discharge   - not met      Plan  Plan details: Pt would benefit from skilled physical therapy 2x/week weaning to 1x/week over the course of 4-6 weeks to address the above impairments and functional limitations to restore normal movement patterns, abolish pain and return to full, unrestricted activity  Patient would benefit from: PT eval and skilled PT  Planned modality interventions: cryotherapy and thermotherapy: hydrocollator packs  Planned therapy interventions: IADL retraining, body mechanics training, flexibility, functional ROM exercises, home exercise program, neuromuscular re-education, manual therapy, postural training, strengthening, stretching, therapeutic activities, therapeutic exercise and joint mobilization  Other planned therapy interventions: mechanical traction  Frequency: 2x week  Duration in visits: 8  Duration in weeks: 4  Plan of Care beginning date: 11/15/2019  Plan of Care expiration date: 12/13/2019  Treatment plan discussed with: patient        Subjective Evaluation    History of Present Illness  Mechanism of injury: Current: Pt presents this visit reporting good pain relief with mechanical traction from last visit  Pt is compliant with his HEP and continues to perform each morning  Pt does note variable results with traction noting sometime relief will last day, other times just hours with pain radiating down either leg  Pt referred to pain management although pt hesitant to pursuit this avenue of pain relief  Pt preferring more holistic approach and considering acupuncture  History: Patient notes long h/o back pain and stiffness  Patient notes he was told he had "disc issues" when he was about 17, secondary to sports injuries  About 3 months ago patient notes he was walking up steps when his RLE went completely numb and he fell onto the steps  Since the fall patient notes RLE pain, numbness through medial knee to just above ankle  Unable to discern cold/hot sensation over area of numbness at recent MD visit  Patient notes increased use of reading glasses over the past 4-5 months, no other vision changes reported  No change in sensation over trunk segments, no change in bowel/bladder function or sensation     Patient is RHD, some weakness noted through RUE following fall, no apparent emilio trauma to UE during fall  Patient notes some discomfort through R elbow  MRI and EMG scheduled for end of month    Pain is described as constant dull pain with intermittent shots of sharp pain    Pain  Current pain ratin  At best pain rating: 3  At worst pain rating: 10  Quality: dull ache and sharp          Objective     Neurological Testing     Reflexes   Left   Patellar (L4): normal (2+)  Achilles (S1): normal (2+)    Right   Patellar (L4): absent (0)  Achilles (S1): trace (1+)    Additional Neurological Details  Light touch sensation diminished over R knee and through L4 distribution on R      Strength/Myotome Testing     Lumbar   Left   Normal strength    Right Hip   Planes of Motion   Flexion: 4- (painful assessment, l/s)  External rotation: 4  Internal rotation: 4    Right Knee   Flexion: 4  Extension: 4    Right Ankle/Foot   Dorsiflexion: 4  Plantar flexion: 4  Inversion: 4  Eversion: 4    General Comments:      Lumbar Comments  AROM  Lumbar; flexion post traction 50*  Extension: 20*  SB: 25%, pain to R  Rotation: NT            Precautions: long h/o LBP    PT 1:1 800-830  Manual  10/15 10/28 11/1          Nerve glides  sciatic 5sx10                            Exercise Diary  11/15 10/21 10/24 10/28 11/1 11/4 11/8 11/11   Prone lying HEP 5 mins np 2x2' - -  Home today   ZAINA HEP 3' np 2x2' 5'' 2'x2 2'x2    Prone press up HEP x3 np np p! -     Bridging HEP p! np np p! 10 p!     Prone hip ext, HS and glute emphasis HEP np np Glute set +TAB 5" x 20 Glute set +TAB 5" x 20 Glute set +TAB 5" x 20     TB press outs nv np np  Standing p ball press on table 2x10 5" np np    TAB + ball squeeze HEP   5" x 20 5" x 20 5" x 20 5"x20    TAB+ ER HEP   5" x 20 5" x 20 5" x 20     Prone hip ext HEP     x10 B 10x     Nerve glides HEP      10x ea    Pt education Pt requesting progression for HEP next visit, hold today       Treatment options, pain managemnet, POC Modalities  10/15 10/21 10/24 10/28 11/1 11/8 11/11  11/15   Mechanical txn  70#, prog  Static6 step; 12 mins, supine 70#, prog   Static6 step; 12 mins, supine 77# prog static, 3 steps 12'; supine 80# prog static, 3 steps 12'; supine 80# prog static, 3 steps 12'; supine 80# prog static, 6 steps 15'; supine  85# prog static, 6 steps 15'; supine, +5' ramp        CP hookyling to end x 15 CP hookyling to end x 15    CP @ home

## 2019-11-22 ENCOUNTER — OFFICE VISIT (OUTPATIENT)
Dept: PHYSICAL THERAPY | Facility: MEDICAL CENTER | Age: 49
End: 2019-11-22
Payer: COMMERCIAL

## 2019-11-22 DIAGNOSIS — M54.16 RIGHT LUMBAR RADICULOPATHY: Primary | ICD-10-CM

## 2019-11-22 PROCEDURE — 97112 NEUROMUSCULAR REEDUCATION: CPT | Performed by: PHYSICAL THERAPIST

## 2019-11-22 PROCEDURE — 97012 MECHANICAL TRACTION THERAPY: CPT | Performed by: PHYSICAL THERAPIST

## 2019-11-22 PROCEDURE — 97110 THERAPEUTIC EXERCISES: CPT | Performed by: PHYSICAL THERAPIST

## 2019-11-22 NOTE — PROGRESS NOTES
Daily Note     Today's date: 2019  Patient name: Honey Prince  : 1970  MRN: [id number]  Referring provider: Emily Bliss MD  Dx:   Encounter Diagnosis     ICD-10-CM    1  Right lumbar radiculopathy M54 16                   Subjective: Pt stating he has been doing well with his current exercise program, is hesitant to progress program as he feels like he has been doing well  Pt has been able to walk 3 miles with just HS and low back tightness and is able to tolerate the inversion table afterwards 2- mins at a time x3 reps to decrease any pressure from walk  Objective: See treatment diary below      Assessment: Tolerated treatment well with good technique with exercises  Pt without pain after session in neutral positioning and with transfer from table to standing  Patient demonstrated fatigue post treatment, exhibited good technique with therapeutic exercises and would benefit from continued PT      Plan: Continue per plan of care  Progress treatment as tolerated  Precautions: long h/o LBP    PT 1:1 800-830  Manual  10/15 10/28 11/1          Nerve glides  sciatic 5sx10                            Exercise Diary  11/15 11/22  10/28 11/1 11/4 11/8 11/11   Prone lying HEP 5 mins  2x2' - -  Home today   ZAINA HEP 3'  2x2' 5'' 2'x2 2'x2    Prone press up HEP x3  np p! -     Bridging HEP 2x10  np p! 10 p!     Prone hip ext, HS and glute emphasis HEP x10 B  Glute set +TAB 5" x 20 Glute set +TAB 5" x 20 Glute set +TAB 5" x 20     TB press outs nv Standing p ball press on table 2x10 5"   Standing p ball press on table 2x10 5" np np    TAB + ball squeeze HEP 5"x20  5" x 20 5" x 20 5" x 20 5"x20    TAB+ ER HEP 5"x20  5" x 20 5" x 20 5" x 20     Prone hip ext HEP 5"x20    x10 B 10x     Nerve glides HEP 10x ea     10x ea    Pt education Pt requesting progression for HEP next visit, hold today       Treatment options, pain managemnet, POC Modalities  10/15 10/21 10/24 10/28 11/1 11/8 11/11  11/15 11/22   Mechanical txn  70#, prog  Static6 step; 12 mins, supine 70#, prog   Static6 step; 12 mins, supine 77# prog static, 3 steps 12'; supine 80# prog static, 3 steps 12'; supine 80# prog static, 3 steps 12'; supine 80# prog static, 6 steps 15'; supine  85# prog static, 6 steps 15'; supine, +5' ramp    85# prog static, 6 steps 15'; supine, +5' ramp     CP hookyling to end x 15 CP hookyling to end x 15    CP @ home

## 2019-11-25 ENCOUNTER — OFFICE VISIT (OUTPATIENT)
Dept: PHYSICAL THERAPY | Facility: MEDICAL CENTER | Age: 49
End: 2019-11-25
Payer: COMMERCIAL

## 2019-11-25 DIAGNOSIS — M54.16 RIGHT LUMBAR RADICULOPATHY: Primary | ICD-10-CM

## 2019-11-25 PROCEDURE — 97014 ELECTRIC STIMULATION THERAPY: CPT | Performed by: PHYSICAL THERAPIST

## 2019-11-25 PROCEDURE — 97530 THERAPEUTIC ACTIVITIES: CPT | Performed by: PHYSICAL THERAPIST

## 2019-11-25 PROCEDURE — G0283 ELEC STIM OTHER THAN WOUND: HCPCS | Performed by: PHYSICAL THERAPIST

## 2019-11-25 PROCEDURE — 97012 MECHANICAL TRACTION THERAPY: CPT | Performed by: PHYSICAL THERAPIST

## 2019-11-26 NOTE — PROGRESS NOTES
Daily Note     Today's date: 2019  Patient name: Judy Javier  : 1970  MRN: [id number]  Referring provider: Rosita Moseley MD  Dx:   Encounter Diagnosis     ICD-10-CM    1  Right lumbar radiculopathy M54 16                   Subjective: Pt arriving this visit in significant 8-9/10 low back and hip pain  Pt stating since he had been feeling much better with daily functional activities he tried to do more around his house  He said he carefully donned the backpack leaf blower and worked on lifting things around the house  He said he was able to do all of this Saturday relatively pain free, but woke up  unable to move or get out of bed  Objective: See treatment diary below  Rigid trunk, antalgic gait, difficulty with transfers, guarded motions, transfers and bed mobility  Assessment: Pt requesting traction as previously he has had good results with this tx  Pt finding good pain relief while on traction table, but pain returning (slightly diminished) once completed  Pt requiring physical assist to transfer, roll and change position, but did state he noted some pain relief with modalities this session  PT shown log-rolling method, safe transfer and body mechanics to work on for future  Pt advised to again f/u with MD (he hasn't heard back from his office) to discuss plan for future treatment/management  Patient would benefit from continued PT      Plan: Continue per plan of care  Progress treatment as tolerated  Precautions: long h/o LBP    PT 1:1 535-620  Manual  10/15 10/28 11/1          Nerve glides  sciatic 5sx10                            Exercise Diary  11/15 11/22 11/25 10/28 11/1 11/4 11/8 11/11   Prone lying HEP 5 mins Unable to tolerate activity 2x2' - -  Home today   ZAINA HEP 3'  2x2' 5'' 2'x2 2'x2    Prone press up HEP x3  np p! -     Bridging HEP 2x10  np p! 10 p!     Prone hip ext, HS and glute emphasis HEP x10 B  Glute set +TAB 5" x 20 Glute set +TAB 5" x 20 Glute set +TAB 5" x 20     TB press outs nv Standing p ball press on table 2x10 5"   Standing p ball press on table 2x10 5" np np    TAB + ball squeeze HEP 5"x20  5" x 20 5" x 20 5" x 20 5"x20    TAB+ ER HEP 5"x20  5" x 20 5" x 20 5" x 20     Prone hip ext HEP 5"x20    x10 B 10x     Nerve glides HEP 10x ea     10x ea    Pt education Pt requesting progression for HEP next visit, hold today       Treatment options, pain managemnet, POC   Pt edu   Transfer training           Log rolling           Body mechanics for lifting                                                        Modalities  11/25 10/21 10/24 10/28 11/1 11/8 11/11  11/15 11/22   Mechanical txn 85# prog static, 6 steps 15'; supine, +5' ramp 70#, prog  Static6 step; 12 mins, supine 70#, prog   Static6 step; 12 mins, supine 77# prog static, 3 steps 12'; supine 80# prog static, 3 steps 12'; supine 80# prog static, 3 steps 12'; supine 80# prog static, 6 steps 15'; supine  85# prog static, 6 steps 15'; supine, +5' ramp    85# prog static, 6 steps 15'; supine, +5' ramp    MHP prone with IFC - sensory p traction CP hookyling to end x 15 CP hookyling to end x 15    CP @ home

## 2019-11-29 ENCOUNTER — APPOINTMENT (OUTPATIENT)
Dept: PHYSICAL THERAPY | Facility: MEDICAL CENTER | Age: 49
End: 2019-11-29
Payer: COMMERCIAL

## 2019-12-02 ENCOUNTER — HOSPITAL ENCOUNTER (OUTPATIENT)
Dept: SLEEP CENTER | Facility: CLINIC | Age: 49
Discharge: HOME | End: 2019-12-02
Payer: COMMERCIAL

## 2019-12-02 ENCOUNTER — OFFICE VISIT (OUTPATIENT)
Dept: PHYSICAL THERAPY | Facility: MEDICAL CENTER | Age: 49
End: 2019-12-02
Payer: COMMERCIAL

## 2019-12-02 DIAGNOSIS — G47.00 FREQUENT NOCTURNAL AWAKENING: ICD-10-CM

## 2019-12-02 DIAGNOSIS — R06.83 SNORING: ICD-10-CM

## 2019-12-02 DIAGNOSIS — R06.89 GASPING FOR BREATH: ICD-10-CM

## 2019-12-02 DIAGNOSIS — G47.8 NON-RESTORATIVE SLEEP: ICD-10-CM

## 2019-12-02 DIAGNOSIS — M54.16 RIGHT LUMBAR RADICULOPATHY: Primary | ICD-10-CM

## 2019-12-02 DIAGNOSIS — R06.81 WITNESSED EPISODE OF APNEA: ICD-10-CM

## 2019-12-02 DIAGNOSIS — R51.9 HEADACHE UPON AWAKENING: ICD-10-CM

## 2019-12-02 PROCEDURE — G0399 HOME SLEEP TEST/TYPE 3 PORTA: HCPCS

## 2019-12-02 PROCEDURE — 97010 HOT OR COLD PACKS THERAPY: CPT | Performed by: PHYSICAL THERAPIST

## 2019-12-02 PROCEDURE — G0399 HOME SLEEP TEST/TYPE 3 PORTA: HCPCS | Performed by: PSYCHIATRY & NEUROLOGY

## 2019-12-02 PROCEDURE — 97012 MECHANICAL TRACTION THERAPY: CPT | Performed by: PHYSICAL THERAPIST

## 2019-12-02 NOTE — PROGRESS NOTES
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  Objective: See treatment diary below  Rigid trunk, antalgic gait, difficulty with transfers, guarded motions, transfers and bed mobility  Assessment:Pt able to complete exercise program without pain with exception of bridging  Otherwise pt doing well with stabilization/NMR and nerve glides  Better mobility overall with decreased pain with transfers and mat mobility although initial steps after prolonged positioning remain painful  Patient would benefit from continued PT      Plan: Continue per plan of care  Progress treatment as tolerated  Precautions: long h/o LBP    PT 1:1 1200-102pm  Manual  10/15 10/28 11/1          Nerve glides  sciatic 5sx10                            Exercise Diary  11/15 11/22 11/25 12/2 11/1 11/4 11/8 11/11   Prone lying HEP 5 mins Unable to tolerate activity 2x2' - -  Home today   ZAINA HEP 3'  10sx10 5'' 2'x2 2'x2    Prone press up HEP x3  x10 p! -     Bridging HEP 2x10  p! p! 10 p!     Prone hip ext, HS and glute emphasis HEP x10 B  Glute set +TAB 5" x 20 Glute set +TAB 5" x 20 Glute set +TAB 5" x 20     TB press outs nv Standing p ball press on table 2x10 5"  nv Standing p ball press on table 2x10 5" np np    TAB + ball squeeze HEP 5"x20  5" x 20 5" x 20 5" x 20 5"x20    TAB+ ER HEP 5"x20  5" x 20 blue 5" x 20 5" x 20     Prone hip ext HEP 5"x20  standing TAB 2x10 B  x10 B 10x     Nerve glides HEP 10x ea  x20   10x ea    Pt education Pt requesting progression for HEP next visit, hold today       Treatment options, pain managemnet, POC   Pt edu   Transfer training STS +TAB 2x10          Log rolling Body mechanics for lifting                                                        Modalities  11/25 12/2 10/24 10/28 11/1 11/8 11/11  11/15 11/22   Mechanical txn 85# prog static, 6 steps 15'; supine, +5' ramp 50#,  Static 6 step; 15 mins, supine 70#, prog   Static6 step; 12 mins, supine 77# prog static, 3 steps 12'; supine 80# prog static, 3 steps 12'; supine 80# prog static, 3 steps 12'; supine 80# prog static, 6 steps 15'; supine  85# prog static, 6 steps 15'; supine, +5' ramp    85# prog static, 6 steps 15'; supine, +5' ramp    MHP prone with IFC - sensory p traction CP hookyling to end x 15 to end CP hookyling to end x 15    CP @ home

## 2019-12-03 NOTE — PROGRESS NOTES
Home Sleep Study Documentation    Pre-Sleep Home Study:    Set-up and instructions performed by: Katty Keene    Technician performed demonstration for Patient: yes    Return demonstration performed by Patient: yes    Written instructions provided to Patient: yes    Patient signed consent form: yes        Post-Sleep Home Study:    Additional comments by Patient: none    Home Sleep Study Failed:no:    Failure reason: N/A    Reported or Detected: N/A    Scored by: Kristen Boyle CRT, RPSGT

## 2019-12-04 DIAGNOSIS — G47.33 OSA (OBSTRUCTIVE SLEEP APNEA): Primary | ICD-10-CM

## 2019-12-05 ENCOUNTER — TELEPHONE (OUTPATIENT)
Dept: SLEEP CENTER | Facility: CLINIC | Age: 49
End: 2019-12-05

## 2019-12-05 NOTE — TELEPHONE ENCOUNTER
----- Message from Asim Osborn MD sent at 12/4/2019  3:09 PM EST -----  HST read  APAP ordered  Follow up in 6-8 weeks

## 2019-12-05 NOTE — TELEPHONE ENCOUNTER
Spoke with patient, advised sleep study reveals mild LEANNE, recommend APAP  Patient would like set up [facility] at [facility]    [facility] representative aware  Compliance follow up scheduled 2/10/20 at 1545 with Dr Ayanna Watters

## 2019-12-06 ENCOUNTER — APPOINTMENT (OUTPATIENT)
Dept: PHYSICAL THERAPY | Facility: MEDICAL CENTER | Age: 49
End: 2019-12-06
Payer: COMMERCIAL

## 2019-12-07 DIAGNOSIS — K21.9 GASTROESOPHAGEAL REFLUX DISEASE, ESOPHAGITIS PRESENCE NOT SPECIFIED: ICD-10-CM

## 2019-12-09 ENCOUNTER — APPOINTMENT (OUTPATIENT)
Dept: PHYSICAL THERAPY | Facility: MEDICAL CENTER | Age: 49
End: 2019-12-09
Payer: COMMERCIAL

## 2019-12-09 RX ORDER — OMEPRAZOLE 40 MG/1
CAPSULE, DELAYED RELEASE ORAL
Qty: 90 CAPSULE | Refills: 1 | Status: SHIPPED | OUTPATIENT
Start: 2019-12-09 | End: 2020-06-04

## 2019-12-13 ENCOUNTER — OFFICE VISIT (OUTPATIENT)
Dept: PHYSICAL THERAPY | Facility: MEDICAL CENTER | Age: 49
End: 2019-12-13
Payer: COMMERCIAL

## 2019-12-13 DIAGNOSIS — M54.16 RIGHT LUMBAR RADICULOPATHY: Primary | ICD-10-CM

## 2019-12-13 PROCEDURE — 97012 MECHANICAL TRACTION THERAPY: CPT | Performed by: PHYSICAL THERAPIST

## 2019-12-13 PROCEDURE — 97112 NEUROMUSCULAR REEDUCATION: CPT | Performed by: PHYSICAL THERAPIST

## 2019-12-13 PROCEDURE — 97110 THERAPEUTIC EXERCISES: CPT | Performed by: PHYSICAL THERAPIST

## 2019-12-13 NOTE — PROGRESS NOTES
PT Re-Evaluation     Today's date: 2019  Patient name: Amanda Allison  : 1970  MRN: [id number]  Referring provider: Marely Ross MD  Dx:   Encounter Diagnosis     ICD-10-CM    1  Right lumbar radiculopathy M54 16                   Assessment  Assessment details: Pt has attended 14 PT visits at this time noting some good, but short term relief, (+) centralization with mechanical traction  Pt returning this visit after 10-day work trip to [place], pt with difficulty with prolonged sitting on plane and walking tasks  He has not been able to return to prior house hold activities/heavy activities  Little change in regards to pain and function since last eval, but pt has not attended therapy in 14 days  Pt stating he is compliant and (I) with his HEP  Improvements in AROM and daily pain have improved since regression after attmepting yard work about 3 weeks ago  Pt f/u with pain management next week, will f/u with PT after this to assist with adjunctive pain relief and continued work on core stength with functional activities and lifting  Pt would benefit from continued PT for pain relief  Impairments: abnormal coordination, abnormal muscle firing, abnormal or restricted ROM, activity intolerance, impaired physical strength, lacks appropriate home exercise program and pain with function  Functional limitations: stair negotiation R knee, prolonged standing, sitting, frequent changes in position, poor sleepUnderstanding of Dx/Px/POC: good   Prognosis: good    Goals  ST  Patient will report 25% decrease in pain in 4 weeks  -MET  2  Patient will demonstrate 25% improvement in ROM in 4 weeks  -MET  3  Patient will demonstrate 1/2 grade improvement in strength in 4 weeks  -MET    LT  Patient will be able to perform IADLS without restriction or pain by discharge  - not met  2  Patient will be independent in HEP by discharge  -MET  3   Patient will be able to return to recreational/work duties without restriction or pain by discharge  - not met      Plan  Plan details: Pt would benefit from skilled physical therapy 2x/week weaning to 1x/week over the course of 4 weeks to address the above impairments and functional limitations to restore normal movement patterns, abolish pain and return to full, unrestricted activity  Patient would benefit from: PT eval and skilled PT  Planned modality interventions: cryotherapy and thermotherapy: hydrocollator packs  Planned therapy interventions: IADL retraining, body mechanics training, flexibility, functional ROM exercises, home exercise program, neuromuscular re-education, manual therapy, postural training, strengthening, stretching, therapeutic activities, therapeutic exercise and joint mobilization  Other planned therapy interventions: mechanical traction  Frequency: 2x week  Duration in visits: 8  Duration in weeks: 4  Plan of Care beginning date: 12/13/2019  Plan of Care expiration date: 1/10/2020  Treatment plan discussed with: patient        Subjective Evaluation    History of Present Illness  Mechanism of injury: Current: Continues to have 3/10 constant pain, pain increases to a >8/10 at times with transfers  Had regression about 2 weeks ago with 10/10 flare-up after attempting some yard work  He does have plans in the near future to re=do his bathrooms, but is concerned regarding the repercussions  Pt considering pain management  Still has intermittent spasms with prone trunk extensions  History: Patient notes long h/o back pain and stiffness  Patient notes he was told he had "disc issues" when he was about 17, secondary to sports injuries  About 3 months ago patient notes he was walking up steps when his RLE went completely numb and he fell onto the steps  Since the fall patient notes RLE pain, numbness through medial knee to just above ankle  Unable to discern cold/hot sensation over area of numbness at recent MD visit   Patient notes increased use of reading glasses over the past 4-5 months, no other vision changes reported  No change in sensation over trunk segments, no change in bowel/bladder function or sensation  Patient is RHD, some weakness noted through RUE following fall, no apparent emilio trauma to UE during fall  Patient notes some discomfort through R elbow  MRI and EMG scheduled for end of month    Pain is described as constant dull pain with intermittent shots of sharp pain    Pain  Current pain ratin  At best pain rating: 3  At worst pain rating: 10  Quality: dull ache and sharp          Objective     Neurological Testing     Reflexes   Left   Patellar (L4): normal (2+)  Achilles (S1): normal (2+)    Right   Patellar (L4): absent (0)  Achilles (S1): trace (1+)    Additional Neurological Details  Light touch sensation diminished over R knee and through L4 distribution on R      Strength/Myotome Testing     Lumbar   Left   Normal strength    Right Hip   Planes of Motion   Flexion: 4- (painful assessment, l/s)  External rotation: 4  Internal rotation: 4    Right Knee   Flexion: 4  Extension: 4    Right Ankle/Foot   Dorsiflexion: 4  Plantar flexion: 4  Inversion: 4  Eversion: 4    General Comments:      Lumbar Comments  AROM  Lumbar;WFL - painful at end range across the low back  Extension: 20* (+) repeated extension - at times experiences spasms  SB: 25%, pain to R  Rotation: 25% - painful to L        Precautions: long h/o LBP    PT 1:1 8-830 + traction 20'  Manual  10/15 10/28 11/1          Nerve glides  sciatic 5sx10                            Exercise Diary  11/15 11/22 11/25 12/2  12/13     Prone lying HEP 5 mins Unable to tolerate activity 2x2'  functional lifting technique for safety w/ cane     ZAINA HEP 3'  10sx10  Alt arm/alt leg over ball x 20     Prone press up HEP x3  x10  Hip flexor stretch kneeling 3x30s B     Bridging HEP 2x10  p!        Prone hip ext, HS and glute emphasis HEP x10 B  Glute set +TAB 5" x 20  Verbal review of HEP - Pt performed prior to PT today     TB press outs nv Standing p ball press on table 2x10 5"  nv       TAB + ball squeeze HEP 5"x20  5" x 20       TAB+ ER HEP 5"x20  5" x 20 blue       Prone hip ext HEP 5"x20  standing TAB 2x10 B       Nerve glides HEP 10x ea  x20       Pt education Pt requesting progression for HEP next visit, hold today          Pt edu   Transfer training STS +TAB 2x10          Log rolling           Body mechanics for lifting                                                        Modalities  11/25 12/2 10/24 10/28 11/1 11/8 11/11  11/15 11/22   Mechanical txn 85# prog static, 6 steps 15'; supine, +5' ramp 50#,  Static 6 step; 15 mins, supine 70#, prog   Static6 step; 12 mins, supine 77# prog static, 3 steps 12'; supine 80# prog static, 3 steps 12'; supine 80# prog static, 3 steps 12'; supine 80# prog static, 6 steps 15'; supine  85# prog static, 6 steps 15'; supine, +5' ramp    85# prog static, 6 steps 15'; supine, +5' ramp    MHP prone with IFC - sensory p traction CP hookyling to end x 15 to end CP hookyling to end x 15    CP @ home

## 2019-12-16 ENCOUNTER — OFFICE VISIT (OUTPATIENT)
Dept: PHYSICAL THERAPY | Facility: MEDICAL CENTER | Age: 49
End: 2019-12-16
Payer: COMMERCIAL

## 2019-12-16 DIAGNOSIS — M54.16 RIGHT LUMBAR RADICULOPATHY: Primary | ICD-10-CM

## 2019-12-16 PROCEDURE — 97012 MECHANICAL TRACTION THERAPY: CPT | Performed by: PHYSICAL THERAPIST

## 2019-12-16 PROCEDURE — 97140 MANUAL THERAPY 1/> REGIONS: CPT | Performed by: PHYSICAL THERAPIST

## 2019-12-16 PROCEDURE — G0283 ELEC STIM OTHER THAN WOUND: HCPCS | Performed by: PHYSICAL THERAPIST

## 2019-12-16 PROCEDURE — 97014 ELECTRIC STIMULATION THERAPY: CPT | Performed by: PHYSICAL THERAPIST

## 2019-12-16 NOTE — PROGRESS NOTES
Daily Note     Today's date: 2019  Patient name: Mychal Cooper  : 1970  MRN: [id number]  Referring provider: Horacio Bernal MD  Dx:   Encounter Diagnosis     ICD-10-CM    1  Right lumbar radiculopathy M54 16                   Subjective: pt arrives this visit in significant pain that he has been unable to manage since last treatment  Pt hoping that traction would help  Pt to go to pain management tomorrow  Unable to tolerate pain  Objective: See treatment diary below      Assessment: Tolerated treatment fair - decreased muscle spasm p MT  Pt unable to tolerate progressions of exercise, functional body mechanics training without significant flare-ups  Pt to f/u with pain management and neuro then PT to determine course of treatment/FU  Patient demonstrated fatigue post treatment and would benefit from continued OT      Plan: Continue per plan of care  Progress treatment as tolerated  Precautions: long h/o LBP    PT 1:1 530-600 + traction 15' + CP x 10  Manual  10/15 10/28 11/1   12/16       Nerve glides  sciatic 5sx10    15' l/s, paraspinals                        Exercise Diary  11/15 11/22 11/25 12/2  12/13 12/16    Prone lying HEP 5 mins Unable to tolerate activity 2x2'  functional lifting technique for safety w/ cane NP d/ tpain    ZAINA HEP 3'  10sx10  Alt arm/alt leg over ball x 20     Prone press up HEP x3  x10  Hip flexor stretch kneeling 3x30s B     Bridging HEP 2x10  p!        Prone hip ext, HS and glute emphasis HEP x10 B  Glute set +TAB 5" x 20  Verbal review of HEP - Pt performed prior to PT today     TB press outs nv Standing p ball press on table 2x10 5"  nv       TAB + ball squeeze HEP 5"x20  5" x 20       TAB+ ER HEP 5"x20  5" x 20 blue       Prone hip ext HEP 5"x20  standing TAB 2x10 B       Nerve glides HEP 10x ea  x20       Pt education Pt requesting progression for HEP next visit, hold today          Pt edu   Transfer training STS +TAB 2x10          Log rolling Body mechanics for lifting                                                        Modalities  11/25 12/2 12/13 12/16 11/1 11/8 11/11  11/15 11/22   Mechanical txn 85# prog static, 6 steps 15'; supine, +5' ramp 85#,  Static 6 step; 15 mins, supine 85#, prog  Static6 step; 12 mins, supine 85#, prog   Static6 step; 12 mins, supine 80# prog static, 3 steps 12'; supine 80# prog static, 3 steps 12'; supine 80# prog static, 6 steps 15'; supine  85# prog static, 6 steps 15'; supine, +5' ramp    85# prog static, 6 steps 15'; supine, +5' ramp    MHP prone with IFC - sensory p traction CP hookyling to end x 15 to end CP hookyling to end x 15    MHP w/ IFC - sensory x 10'             CP to end in prone w/pillow

## 2019-12-17 ENCOUNTER — CONSULT (OUTPATIENT)
Dept: PAIN MEDICINE | Facility: CLINIC | Age: 49
End: 2019-12-17
Payer: COMMERCIAL

## 2019-12-17 VITALS
SYSTOLIC BLOOD PRESSURE: 122 MMHG | BODY MASS INDEX: 32.34 KG/M2 | HEIGHT: 73 IN | HEART RATE: 76 BPM | WEIGHT: 244 LBS | RESPIRATION RATE: 18 BRPM | DIASTOLIC BLOOD PRESSURE: 74 MMHG

## 2019-12-17 DIAGNOSIS — G89.4 CHRONIC PAIN SYNDROME: ICD-10-CM

## 2019-12-17 DIAGNOSIS — M51.16 LUMBAR DISC DISEASE WITH RADICULOPATHY: Primary | ICD-10-CM

## 2019-12-17 DIAGNOSIS — M54.16 RADICULOPATHY, LUMBAR REGION: ICD-10-CM

## 2019-12-17 DIAGNOSIS — M62.838 MUSCLE SPASM: ICD-10-CM

## 2019-12-17 PROCEDURE — 99244 OFF/OP CNSLTJ NEW/EST MOD 40: CPT | Performed by: PHYSICAL MEDICINE & REHABILITATION

## 2019-12-17 RX ORDER — GABAPENTIN 300 MG/1
300 CAPSULE ORAL 3 TIMES DAILY
Qty: 90 CAPSULE | Refills: 1 | Status: SHIPPED | OUTPATIENT
Start: 2019-12-17 | End: 2020-04-07

## 2019-12-17 RX ORDER — METHOCARBAMOL 500 MG/1
500 TABLET, FILM COATED ORAL DAILY PRN
Qty: 15 TABLET | Refills: 0 | Status: SHIPPED | OUTPATIENT
Start: 2019-12-17 | End: 2020-04-07

## 2019-12-17 NOTE — PROGRESS NOTES
Pt failed conservative treatment and is expected to undergo injections for pain relief  Pt will f/u with PT with any additional concerns, but will be DC at this time

## 2019-12-17 NOTE — PROGRESS NOTES
Assessment  1  Lumbar disc disease with radiculopathy    2  Radiculopathy, lumbar region    3  Chronic pain syndrome        Plan  Mr Lana Rhodes is a pleasant 63-year-old male who presents to [place] [address] and Pain associates with complaints of longstanding low back pain with gradually worsening radicular symptoms over the last 8 months  Today he is demonstrating clinical and diagnostic evidence of lumbar radiculopathy into the right lower extremity likely secondary to the significant disc pathology of a far right paracentral subarticular small disc protrusion found at L5-S1  He has already completed 9 weeks of physical therapy without significant relief in his pain  At this time we will  1  Order right-sided L4-L5 and L5-S1 TFESI under fluoro guidance  2  Start gabapentin 300 mg at titrating up dose to goal 300 mg 3 times a day  3  Robaxin daily p r n  for muscle spasms  4  Advised to continue home exercise regimen  5  Complete risks and benefits including bleeding, infection, tissue reaction, nerve injury and allergic reaction were discussed  The approach was demonstrated using models and literature was provided  Verbal and written consent was obtained  My impressions and treatment recommendations were discussed in detail with the patient who verbalized understanding and had no further questions  Discharge instructions were provided  I personally saw and examined the patient and I agree with the above discussed plan of care  Orders Placed This Encounter   Procedures    FL spine and pain procedure     Standing Status:   Future     Standing Expiration Date:   12/17/2023     Order Specific Question:   Reason for Exam:     Answer:   Right L4-L5, L5-S1 TFESI     Order Specific Question:   Anticoagulant hold needed?      Answer:   No     New Medications Ordered This Visit   Medications    gabapentin (NEURONTIN) 300 mg capsule     Sig: Take 1 capsule (300 mg total) by mouth 3 (three) times a day     Dispense: 90 capsule     Refill:  1       History of Present Illness    Berkley Beltrán is a 54 y o  male presents to [place] [address] and Pain associates with greater than 8 months duration of worsening radiating right sided low back into the right leg pain  He states there was no significant inciting event or recent trauma  Today he reports pain to be moderate to severe rated 8/10 and interfering with his daily activities  He reports the pain to be constant 100% of the time  He describes the pain as burning, shooting, numbness, sharp, throbbing, dull and aching  Also complains of intermittent right lower extremity "leg giving out"  Pain is worsened with lying down, standing, bending, sitting, walking, exercise, coughing, sneezing  He has had no significant improvement with 9 weeks of physical therapy, heat and ice and TENS unit  He has seen his PCP home started him on gabapentin but has not started taking the medication due to confusion with the dose  We do have an MRI with results below  Patient presents today for evaluation of worsening radiating low back pain  Of note he says that he has had chronic low back pain since he was 17years old but has just dealt with it with conservative measures  I have personally reviewed and/or updated the patient's past medical history, past surgical history, family history, social history, current medications, allergies, and vital signs today  Review of Systems   Constitutional: Negative for fever and unexpected weight change  HENT: Negative for trouble swallowing  Eyes: Negative for visual disturbance  Respiratory: Negative for shortness of breath and wheezing  Cardiovascular: Negative for chest pain and palpitations  Gastrointestinal: Negative for constipation, diarrhea, nausea and vomiting  Endocrine: Negative for cold intolerance, heat intolerance and polydipsia  Genitourinary: Negative for difficulty urinating and frequency     Musculoskeletal: Positive for myalgias  Negative for arthralgias, gait problem and joint swelling  Skin: Negative for rash  Neurological: Positive for numbness and headaches  Negative for dizziness, seizures, syncope and weakness  Hematological: Does not bruise/bleed easily  Psychiatric/Behavioral: Negative for dysphoric mood  The patient is nervous/anxious  All other systems reviewed and are negative  Patient Active Problem List   Diagnosis    Esophageal reflux    Erectile dysfunction of non-organic origin    Adjustment reaction with anxiety and depression    LEANNE (obstructive sleep apnea)    Snoring    Non-restorative sleep       Past Medical History:   Diagnosis Date    Anxiety     OCD (obsessive compulsive disorder)     Umbilical hernia     last assessed 08/30/17       Past Surgical History:   Procedure Laterality Date    ARTHROSCOPY KNEE Left     [place] REPAIR UMBILICAL [id number] N/A 10/27/2017    Procedure: REPAIR HERNIA UMBILICAL with mesh;  Surgeon: Miki Morse MD;  Location: [facility];  Service: General       Family History   Problem Relation Age of Onset    No Known Problems Mother     Other Father         adopted by father       Social History     Occupational History    Not on file   Tobacco Use    Smoking status: Never Smoker    Smokeless tobacco: Former User   Substance and Sexual Activity    Alcohol use: Yes     Alcohol/week: 5 0 standard drinks     Types: 5 Cans of beer per week    Drug use: No    Sexual activity: Not on file       Current Outpatient Medications on File Prior to Visit   Medication Sig    escitalopram (LEXAPRO) 10 mg tablet TAKE 1 TABLET BY MOUTH EVERY DAY    omeprazole (PriLOSEC) 40 MG capsule TAKE 1 CAPSULE BY MOUTH EVERY DAY    tadalafil (CIALIS) 5 MG tablet TAKE 1 TABLET BY MOUTH EVERY DAY    meloxicam (MOBIC) 7 5 mg tablet One or 2 pills p o  Q day p r n   Pain    [DISCONTINUED] gabapentin (NEURONTIN) 100 mg capsule 1-3 pills 3 times daily as directed     No current facility-administered medications on file prior to visit  Allergies   Allergen Reactions    Penicillin V Hives       Physical Exam    /74   Pulse 76   Resp 18   Ht 6' 1" (1 854 m)   Wt 111 kg (244 lb)   BMI 32 19 kg/m²     General: Well-developed, well-nourished individual in no acute distress  Mental: Appropriate mood and affect  Grossly oriented with coherent speech and thought processing  Neuro:  Cranial nerves: Cranial nerve function is grossly intact bilaterally  Strength: Bilateral lower extremity strength is normal and symmetric except for 4-out of 5 right hip flexion and 4/5 full knee extension limited by pain in the right-sided low back  No atrophy or tone abnormalities noted  Reflexes:  Hyporeflexic right patellar reflex and bilateral Achilles reflexes  No ankle clonus is noted  Sensation:  Decreased sensation to light touch in patchy distribution over right anterior and anterolateral right leg  SLR/Foraminal Compression Maneuvers: Straight leg raising in the   sitting position is positive  for radicular pain right lower extremity  Gait:  Gait/gross motor: Gait is antalgic  Station is forward flexed posture    Musculoskeletal:  Palpation: Inspection and palpation of the spine and extremities are remarkable for tenderness to palpation right-sided lumbar paraspinals  Spine:  Decreased active and passive range of motion with forward flexion and extension limited by pain  No gross axial skeletal deformities  Skin: Skin inspection grossly negative for erythema, breakdown, or concerning lesions in affected area  Lymph: No lymphadenopathy is appreciated in the involved extremity  Vessels: No lower extremity edema  Lungs: Breathing is comfortable and regular  No dyspnea noted during examination  Eyes: Visual field grossly intact to confrontation  No redness appreciated  ENT: No craniofacial deformities or asymmetry   No neck masses appreciated  Imaging  Lumbar MRI 11/5/19: L1-L2:  There is disc bulge  Moderate bilateral facet arthropathy  There is no significant canal or foraminal stenosis     L2-L3:  No significant disc bulge  Moderate bilateral facet arthropathy  There is no canal stenosis  No significant foraminal narrowing     L3-L4:  There is disc bulge  Moderate bilateral facet arthropathy and mildly prominent posterior epidural fat contributing to mild canal narrowing  There is mild bilateral foraminal narrowing     L4-L5:  There is mild disc bulge and moderate bilateral facet arthropathy  There is mildly prominent posterior epidural fat  No significant canal stenosis  There is left greater than right mild bilateral foraminal narrowing      L5-S1:  There is mild disc bulge and superimposed far right paracentral/subarticular small disc protrusion  There is possible minimal improvement of right S1 nerve root (series 6 image 23)  No canal stenosis  There is mild right foraminal narrowing     IMPRESSION:     1  Degenerative changes without high-grade canal or foraminal stenosis, as detailed      2  Disc bulge and superimposed far right paracentral/subarticular small disc protrusion at level L5-S1 with possible minimal abutment of right S1 nerve root  No canal stenosis    There is mild right foraminal narrowing at this level

## 2019-12-20 ENCOUNTER — APPOINTMENT (OUTPATIENT)
Dept: PHYSICAL THERAPY | Facility: MEDICAL CENTER | Age: 49
End: 2019-12-20
Payer: COMMERCIAL

## 2020-01-22 ENCOUNTER — HOSPITAL ENCOUNTER (OUTPATIENT)
Dept: RADIOLOGY | Facility: CLINIC | Age: 50
Discharge: HOME | End: 2020-01-22
Admitting: PHYSICAL MEDICINE & REHABILITATION
Payer: COMMERCIAL

## 2020-01-22 VITALS
DIASTOLIC BLOOD PRESSURE: 82 MMHG | OXYGEN SATURATION: 97 % | SYSTOLIC BLOOD PRESSURE: 121 MMHG | RESPIRATION RATE: 20 BRPM | TEMPERATURE: 98.4 F | HEART RATE: 77 BPM

## 2020-01-22 DIAGNOSIS — M51.16 LUMBAR DISC DISEASE WITH RADICULOPATHY: ICD-10-CM

## 2020-01-22 DIAGNOSIS — G89.4 CHRONIC PAIN SYNDROME: ICD-10-CM

## 2020-01-22 PROCEDURE — 64483 NJX AA&/STRD TFRM EPI L/S 1: CPT | Performed by: PHYSICAL MEDICINE & REHABILITATION

## 2020-01-22 PROCEDURE — 64484 NJX AA&/STRD TFRM EPI L/S EA: CPT | Performed by: PHYSICAL MEDICINE & REHABILITATION

## 2020-01-22 RX ORDER — 0.9 % SODIUM CHLORIDE 0.9 %
10 VIAL (ML) INJECTION ONCE
Status: COMPLETED | OUTPATIENT
Start: 2020-01-22 | End: 2020-01-22

## 2020-01-22 RX ORDER — BUPIVACAINE HCL/PF 2.5 MG/ML
10 VIAL (ML) INJECTION ONCE
Status: COMPLETED | OUTPATIENT
Start: 2020-01-22 | End: 2020-01-22

## 2020-01-22 RX ORDER — PAPAVERINE HCL 150 MG
20 CAPSULE, EXTENDED RELEASE ORAL ONCE
Status: COMPLETED | OUTPATIENT
Start: 2020-01-22 | End: 2020-01-22

## 2020-01-22 RX ADMIN — BUPIVACAINE HYDROCHLORIDE 1 ML: 2.5 INJECTION, SOLUTION EPIDURAL; INFILTRATION; INTRACAUDAL at 10:37

## 2020-01-22 RX ADMIN — DEXAMETHASONE SODIUM PHOSPHATE 20 MG: 10 INJECTION, SOLUTION INTRAMUSCULAR; INTRAVENOUS at 10:37

## 2020-01-22 RX ADMIN — SODIUM CHLORIDE 2 ML: 9 INJECTION, SOLUTION INTRAMUSCULAR; INTRAVENOUS; SUBCUTANEOUS at 10:35

## 2020-01-22 RX ADMIN — IOHEXOL 2 ML: 300 INJECTION, SOLUTION INTRAVENOUS at 10:36

## 2020-01-22 RX ADMIN — Medication 2 ML: at 10:35

## 2020-01-22 NOTE — H&P
History of Present Illness: The patient is a 54 y o  male who presents with complaints of right-sided low back pain    Patient Active Problem List   Diagnosis    Esophageal reflux    Erectile dysfunction of non-organic origin    Adjustment reaction with anxiety and depression    LEANNE (obstructive sleep apnea)    Snoring    Non-restorative sleep       Past Medical History:   Diagnosis Date    Anxiety     OCD (obsessive compulsive disorder)     Umbilical hernia     last assessed 08/30/17       Past Surgical History:   Procedure Laterality Date    ARTHROSCOPY KNEE Left     [place] REPAIR UMBILICAL WUTX,9+R/Q,XSPTD N/A 10/27/2017    Procedure: REPAIR HERNIA UMBILICAL with mesh;  Surgeon: Casie Kirby MD;  Location: [facility];  Service: General         Current Outpatient Medications:     escitalopram (LEXAPRO) 10 mg tablet, TAKE 1 TABLET BY MOUTH EVERY DAY, Disp: 90 tablet, Rfl: 1    gabapentin (NEURONTIN) 300 mg capsule, Take 1 capsule (300 mg total) by mouth 3 (three) times a day, Disp: 90 capsule, Rfl: 1    meloxicam (MOBIC) 7 5 mg tablet, One or 2 pills p o  Q day p r n   Pain, Disp: 30 tablet, Rfl: 3    methocarbamol (ROBAXIN) 500 mg tablet, Take 1 tablet (500 mg total) by mouth daily as needed for muscle spasms, Disp: 15 tablet, Rfl: 0    omeprazole (PriLOSEC) 40 MG capsule, TAKE 1 CAPSULE BY MOUTH EVERY DAY, Disp: 90 capsule, Rfl: 1    tadalafil (CIALIS) 5 MG tablet, TAKE 1 TABLET BY MOUTH EVERY DAY, Disp: 30 tablet, Rfl: 7    Current Facility-Administered Medications:     bupivacaine (PF) (MARCAINE) 0 25 % injection 10 mL, 10 mL, Epidural, Once, Zakiya Long DO    dexamethasone (PF) (DECADRON) injection 20 mg, 20 mg, Epidural, Once, Zakiya Long,     iohexol (OMNIPAQUE) 300 mg/mL injection 50 mL, 50 mL, Epidural, Once, Zakiya Long DO    lidocaine (PF) (XYLOCAINE-MPF) 2 % injection 5 mL, 5 mL, Infiltration, Once, Zakiya Long DO    sodium chloride (PF) 0 9 % injection 10 mL, 10 mL, Infiltration, Once, Sharyle Blase, DO    Allergies   Allergen Reactions    Penicillin V Hives       Physical Exam: There were no vitals filed for this visit  General: Awake, Alert, Oriented x 3, Mood and affect appropriate  Respiratory: Respirations even and unlabored  Cardiovascular: Peripheral pulses intact; no edema  Musculoskeletal Exam:  Tenderness to palpation right-sided lumbar paraspinals    ASA Score: 2    Patient/Chart Verification  Patient ID Verified: Verbal  Consents Confirmed: To be obtained in the Pre-Procedure area  H&P( within 30 days) Verified: To be obtained in the Pre-Procedure area  Allergies Reviewed: Yes  Anticoag/NSAID held?: NA  Currently on antibiotics?: No    Assessment:   1  Lumbar disc disease with radiculopathy    2   Chronic pain syndrome        Plan: Right L4-L5, L5-S1 TFESI

## 2020-01-22 NOTE — DISCHARGE INSTR - LAB
Epidural Steroid Injection   WHAT YOU NEED TO KNOW:   An epidural steroid injection (KEYLA) is a procedure to inject steroid medicine into the epidural space  The epidural space is between your spinal cord and vertebrae  Steroids reduce inflammation and fluid buildup in your spine that may be causing pain  You may be given pain medicine along with the steroids  ACTIVITY  · Do not drive or operate machinery today  · No strenuous activity today - bending, lifting, etc   · You may resume normal activites starting tomorrow - start slowly and as tolerated  · You may shower today, but no tub baths or hot tubs  · You may have numbness for several hours from the local anesthetic  Please use caution and common sense, especially with weight-bearing activities  CARE OF THE INJECTION SITE  · If you have soreness or pain, apply ice to the area today (20 minutes on/20 minutes off)  · Starting tomorrow, you may use warm, moist heat or ice if needed  · You may have an increase or change in your discomfort for 36-48 hours after your treatment  · Apply ice and continue with any pain medication you have been prescribed  · Notify [facility] if you have any of the following: redness, drainage, swelling, headache, stiff neck or fever above 100°F     SPECIAL INSTRUCTIONS  · Our office will contact you in approximately 7 days for a progress report  MEDICATIONS  · Continue to take all routine medications  · Our office may have instructed you to hold some medications  If you have a problem specifically related to your procedure, please call our office at [phone]  Problems not related to your procedure should be directed to your primary care physician

## 2020-01-29 ENCOUNTER — TELEPHONE (OUTPATIENT)
Dept: PAIN MEDICINE | Facility: CLINIC | Age: 50
End: 2020-01-29

## 2020-02-11 ENCOUNTER — TELEPHONE (OUTPATIENT)
Dept: PAIN MEDICINE | Facility: MEDICAL CENTER | Age: 50
End: 2020-02-11

## 2020-02-11 NOTE — TELEPHONE ENCOUNTER
Pt called stating that he needs a letter stating that he can go back on light duty   Pt states he is a  and needs this letter        Pt can be reached at [phone]

## 2020-02-12 NOTE — TELEPHONE ENCOUNTER
S/w pt and advised of below  Pt states after 1/22 epidural he put himself onlight duty at the [address] [place] [address]  as a precaution post procedure  Pt wants to return to normal activity but [facility] would like something in writing stating "may resume normal activity "  Pt does not think he needs an FCE but will proceed if necessary  Pt states post procedure "everthing is well" and he was "very impressed" with results  Pt will f/u next week as scheduled    Please advise if FCE necessary or if a letter can be drafted?

## 2020-02-12 NOTE — TELEPHONE ENCOUNTER
FCE would not be necessary  He may resume normal activity  Will see him on office if he needs me to sign or write something regarding normal activity

## 2020-02-19 ENCOUNTER — OFFICE VISIT (OUTPATIENT)
Dept: PAIN MEDICINE | Facility: CLINIC | Age: 50
End: 2020-02-19
Payer: COMMERCIAL

## 2020-02-19 VITALS
WEIGHT: 249 LBS | SYSTOLIC BLOOD PRESSURE: 118 MMHG | HEIGHT: 73 IN | RESPIRATION RATE: 18 BRPM | HEART RATE: 72 BPM | DIASTOLIC BLOOD PRESSURE: 74 MMHG | BODY MASS INDEX: 33 KG/M2

## 2020-02-19 DIAGNOSIS — M51.16 LUMBAR DISC DISEASE WITH RADICULOPATHY: Primary | ICD-10-CM

## 2020-02-19 PROCEDURE — 3008F BODY MASS INDEX DOCD: CPT | Performed by: PHYSICAL MEDICINE & REHABILITATION

## 2020-02-19 PROCEDURE — 99214 OFFICE O/P EST MOD 30 MIN: CPT | Performed by: PHYSICAL MEDICINE & REHABILITATION

## 2020-02-19 PROCEDURE — 1036F TOBACCO NON-USER: CPT | Performed by: PHYSICAL MEDICINE & REHABILITATION

## 2020-02-19 NOTE — PROGRESS NOTES
Assessment:  No diagnosis found  Plan:  Mr Chet Ascencio is a pleasant 53-year-old male who presents for follow-up regarding lumbar radiculopathy  We recently performed a right-sided L4-L5 and L5-S1 TFESI in which the patient reports significant improvement in his radiating leg and low back pain  However, during today's evaluation the patient reports worsening radicular symptoms into the left lower extremity  He does have an MRI of the lumbar spine which demonstrates left greater than right bilateral foraminal narrowing most notable at L4-L5 as well as the small disc protrusion and foraminal narrowing at L5-S1  These may be contributing to his symptoms  Given the significant improvements with the previous epidural and his current clinical and diagnostic evidence of lumbar radiculopathy into the left lower extremity  I believe it would be beneficial to move forward with an interventional approach to manage this radiating pain again  As such we will  1  Plan for left-sided L4-L5 and L5-S1 TFESI under fluoro guidance  2  Offered oral neuropathic pain meds including gabapentin however patient wishes to focus on therapeutic and interventional approach to manage his pain  3  Complete risks and benefits including bleeding, infection, tissue reaction, nerve injury and allergic reaction were discussed  The approach was demonstrated using models and literature was provided  Verbal and written consent was obtained  My impressions and treatment recommendations were discussed in detail with the patient who verbalized understanding and had no further questions  Discharge instructions were provided  I personally saw and examined the patient and I agree with the above discussed plan of care  No orders of the defined types were placed in this encounter  No orders of the defined types were placed in this encounter        History of Present Illness:  Tamra Blanchard is a 54 y o  male who presents for a follow up office visit in regards to Back Pain (Left)  The patients current symptoms include left-sided low back pain radiating to the left lower extremity  He reports 5/10 pain that is worse in the morning in the evening  He states the pain is constant 100% of the time that is described as burning, sharp, shooting in the left lower extremity lateral leg  Overall reports significant improvement of almost 100% on the right-sided low back radiating leg pain after a right-sided L4-L5 and L5-S1 TFESI on January 22, 2020  Presents today for follow-up and re-evaluation  I have personally reviewed and/or updated the patient's past medical history, past surgical history, family history, social history, current medications, allergies, and vital signs today  Review of Systems   Respiratory: Negative for shortness of breath  Cardiovascular: Negative for chest pain  Gastrointestinal: Negative for constipation, diarrhea, nausea and vomiting  Musculoskeletal: Positive for gait problem and myalgias  Negative for arthralgias and joint swelling  DROM   Skin: Negative for rash  Neurological: Negative for dizziness, seizures and weakness  All other systems reviewed and are negative        Patient Active Problem List   Diagnosis    Esophageal reflux    Erectile dysfunction of non-organic origin    Adjustment reaction with anxiety and depression    LEANNE (obstructive sleep apnea)    Snoring    Non-restorative sleep    Lumbar disc disease with radiculopathy    Chronic pain syndrome       Past Medical History:   Diagnosis Date    Anxiety     OCD (obsessive compulsive disorder)     Umbilical hernia     last assessed 08/30/17       Past Surgical History:   Procedure Laterality Date    ARTHROSCOPY KNEE Left     [place] REPAIR UMBILICAL EQGM,3+W/C,TFQRY N/A 10/27/2017    Procedure: REPAIR HERNIA UMBILICAL with mesh;  Surgeon: Destiny Bishop MD;  Location: [facility];  Service: General       Family History   Problem Relation Age of Onset    No Known Problems Mother     Other Father         adopted by father       Social History     Occupational History    Not on file   Tobacco Use    Smoking status: Never Smoker    Smokeless tobacco: Former User   Substance and Sexual Activity    Alcohol use: Yes     Alcohol/week: 5 0 standard drinks     Types: 5 Cans of beer per week    Drug use: No    Sexual activity: Not on file       Current Outpatient Medications on File Prior to Visit   Medication Sig    escitalopram (LEXAPRO) 10 mg tablet TAKE 1 TABLET BY MOUTH EVERY DAY    gabapentin (NEURONTIN) 300 mg capsule Take 1 capsule (300 mg total) by mouth 3 (three) times a day    meloxicam (MOBIC) 7 5 mg tablet One or 2 pills p o  Q day p r n  Pain    methocarbamol (ROBAXIN) 500 mg tablet Take 1 tablet (500 mg total) by mouth daily as needed for muscle spasms    omeprazole (PriLOSEC) 40 MG capsule TAKE 1 CAPSULE BY MOUTH EVERY DAY    tadalafil (CIALIS) 5 MG tablet TAKE 1 TABLET BY MOUTH EVERY DAY     No current facility-administered medications on file prior to visit  Allergies   Allergen Reactions    Penicillin V Hives       Physical Exam:    /74   Pulse 72   Resp 18   Ht 6' 1" (1 854 m)   Wt 113 kg (249 lb)   BMI 32 85 kg/m²     Constitutional:normal, well developed, well nourished, alert, in no distress and non-toxic and no overt pain behavior    Eyes:anicteric  HEENT:grossly intact  Neck:supple, symmetric, trachea midline and no masses   Pulmonary:even and unlabored  Cardiovascular:No edema or pitting edema present  Skin:Normal without rashes or lesions and well hydrated  Psychiatric:Mood and affect appropriate  Neurologic:Cranial Nerves II-XII grossly intact  Musculoskeletal:antalgic, tenderness to palpation left-sided lumbar paraspinals, decreased active and passive range of motion lumbar spine with flexion extension limited by pain on the left side, positive straight leg raise in the supine position with radicular symptoms into the left lower extremity at the suspected L5 dermatomal distribution along the lateral leg    MMT 5/5 bilateral lower extremities    Imaging

## 2020-02-26 ENCOUNTER — HOSPITAL ENCOUNTER (OUTPATIENT)
Dept: RADIOLOGY | Facility: CLINIC | Age: 50
Discharge: HOME | End: 2020-02-26
Attending: PHYSICAL MEDICINE & REHABILITATION | Admitting: PHYSICAL MEDICINE & REHABILITATION
Payer: COMMERCIAL

## 2020-02-26 VITALS
RESPIRATION RATE: 16 BRPM | HEART RATE: 69 BPM | SYSTOLIC BLOOD PRESSURE: 121 MMHG | DIASTOLIC BLOOD PRESSURE: 78 MMHG | OXYGEN SATURATION: 96 % | TEMPERATURE: 98.2 F

## 2020-02-26 DIAGNOSIS — M51.16 LUMBAR DISC DISEASE WITH RADICULOPATHY: ICD-10-CM

## 2020-02-26 PROCEDURE — 64484 NJX AA&/STRD TFRM EPI L/S EA: CPT | Performed by: PHYSICAL MEDICINE & REHABILITATION

## 2020-02-26 PROCEDURE — 64483 NJX AA&/STRD TFRM EPI L/S 1: CPT | Performed by: PHYSICAL MEDICINE & REHABILITATION

## 2020-02-26 RX ORDER — PAPAVERINE HCL 150 MG
20 CAPSULE, EXTENDED RELEASE ORAL ONCE
Status: COMPLETED | OUTPATIENT
Start: 2020-02-26 | End: 2020-02-26

## 2020-02-26 RX ORDER — BUPIVACAINE HCL/PF 2.5 MG/ML
10 VIAL (ML) INJECTION ONCE
Status: COMPLETED | OUTPATIENT
Start: 2020-02-26 | End: 2020-02-26

## 2020-02-26 RX ORDER — 0.9 % SODIUM CHLORIDE 0.9 %
10 VIAL (ML) INJECTION ONCE
Status: COMPLETED | OUTPATIENT
Start: 2020-02-26 | End: 2020-02-26

## 2020-02-26 RX ADMIN — SODIUM CHLORIDE 1 ML: 9 INJECTION, SOLUTION INTRAMUSCULAR; INTRAVENOUS; SUBCUTANEOUS at 08:50

## 2020-02-26 RX ADMIN — DEXAMETHASONE SODIUM PHOSPHATE 20 MG: 10 INJECTION, SOLUTION INTRAMUSCULAR; INTRAVENOUS at 08:52

## 2020-02-26 RX ADMIN — Medication 1 ML: at 08:50

## 2020-02-26 RX ADMIN — BUPIVACAINE HYDROCHLORIDE 1 ML: 2.5 INJECTION, SOLUTION EPIDURAL; INFILTRATION; INTRACAUDAL at 08:52

## 2020-02-26 RX ADMIN — IOHEXOL 2 ML: 300 INJECTION, SOLUTION INTRAVENOUS at 08:52

## 2020-02-26 NOTE — DISCHARGE INSTR - LAB
Epidural Steroid Injection   WHAT YOU NEED TO KNOW:   An epidural steroid injection (KEYLA) is a procedure to inject steroid medicine into the epidural space  The epidural space is between your spinal cord and vertebrae  Steroids reduce inflammation and fluid buildup in your spine that may be causing pain  You may be given pain medicine along with the steroids  ACTIVITY  · Do not drive or operate machinery today  · No strenuous activity today - bending, lifting, etc   · You may resume normal activites starting tomorrow - start slowly and as tolerated  · You may shower today, but no tub baths or hot tubs  · You may have numbness for several hours from the local anesthetic  Please use caution and common sense, especially with weight-bearing activities  CARE OF THE INJECTION SITE  · If you have soreness or pain, apply ice to the area today (20 minutes on/20 minutes off)  · Starting tomorrow, you may use warm, moist heat or ice if needed  · You may have an increase or change in your discomfort for 36-48 hours after your treatment  · Apply ice and continue with any pain medication you have been prescribed  · [facility] if you have any of the following: redness, drainage, swelling, headache, stiff neck or fever above 100°F     SPECIAL INSTRUCTIONS  · Our office will contact you in approximately 7 days for a progress report  MEDICATIONS  · Continue to take all routine medications  · Our office may have instructed you to hold some medications  If you have a problem specifically related to your procedure, please call our office at [phone]  Problems not related to your procedure should be directed to your primary care physician

## 2020-02-26 NOTE — H&P
History of Present Illness: The patient is a 54 y o  male who presents with complaints of left-sided low back pain    Patient Active Problem List   Diagnosis    Esophageal reflux    Erectile dysfunction of non-organic origin    Adjustment reaction with anxiety and depression    LEANNE (obstructive sleep apnea)    Snoring    Non-restorative sleep    Lumbar disc disease with radiculopathy    Chronic pain syndrome       Past Medical History:   Diagnosis Date    Anxiety     OCD (obsessive compulsive disorder)     Umbilical hernia     last assessed 08/30/17       Past Surgical History:   Procedure Laterality Date    ARTHROSCOPY KNEE Left     [place] REPAIR UMBILICAL LXQF,9+S/G,VQHSR N/A 10/27/2017    Procedure: REPAIR HERNIA UMBILICAL with mesh;  Surgeon: Charli Ruano MD;  Location: [facility];  Service: General         Current Outpatient Medications:     escitalopram (LEXAPRO) 10 mg tablet, TAKE 1 TABLET BY MOUTH EVERY DAY, Disp: 90 tablet, Rfl: 1    gabapentin (NEURONTIN) 300 mg capsule, Take 1 capsule (300 mg total) by mouth 3 (three) times a day (Patient not taking: Reported on 2/19/2020), Disp: 90 capsule, Rfl: 1    meloxicam (MOBIC) 7 5 mg tablet, One or 2 pills p o  Q day p r n  Pain, Disp: 30 tablet, Rfl: 3    methocarbamol (ROBAXIN) 500 mg tablet, Take 1 tablet (500 mg total) by mouth daily as needed for muscle spasms (Patient not taking: Reported on 2/19/2020), Disp: 15 tablet, Rfl: 0    omeprazole (PriLOSEC) 40 MG capsule, TAKE 1 CAPSULE BY MOUTH EVERY DAY, Disp: 90 capsule, Rfl: 1    tadalafil (CIALIS) 5 MG tablet, TAKE 1 TABLET BY MOUTH EVERY DAY, Disp: 30 tablet, Rfl: 7  No current facility-administered medications for this encounter       Allergies   Allergen Reactions    Penicillin V Hives       Physical Exam:   Vitals:    02/26/20 0833   BP: 104/67   Pulse: 59   Resp: 16   Temp: 98 2 °F (36 8 °C)   SpO2: 93%     General: Awake, Alert, Oriented x 3, Mood and affect appropriate  Respiratory: Respirations even and unlabored  Cardiovascular: Peripheral pulses intact; no edema  Musculoskeletal Exam:  Tenderness to palpation left-sided lumbar paraspinals    ASA Score: 2    Patient/Chart Verification  Patient ID Verified: Verbal  ID Band Applied: No  Consents Confirmed: Procedural, To be obtained in the Pre-Procedure area  H&P( within 30 days) Verified: To be obtained in the Pre-Procedure area  Allergies Reviewed: Yes  Anticoag/NSAID held?: No  Currently on antibiotics?: No    Assessment:   1   Lumbar disc disease with radiculopathy        Plan: Left sided L4-L5 and L5-S1 TFESI

## 2020-03-04 ENCOUNTER — TELEPHONE (OUTPATIENT)
Dept: PAIN MEDICINE | Facility: CLINIC | Age: 50
End: 2020-03-04

## 2020-03-12 DIAGNOSIS — F43.23 ADJUSTMENT REACTION WITH ANXIETY AND DEPRESSION: ICD-10-CM

## 2020-03-13 RX ORDER — ESCITALOPRAM OXALATE 10 MG/1
TABLET ORAL
Qty: 90 TABLET | Refills: 1 | Status: SHIPPED | OUTPATIENT
Start: 2020-03-13 | End: 2020-09-10

## 2020-03-23 ENCOUNTER — OFFICE VISIT (OUTPATIENT)
Dept: SLEEP CENTER | Facility: CLINIC | Age: 50
End: 2020-03-23
Payer: COMMERCIAL

## 2020-03-23 ENCOUNTER — TELEPHONE (OUTPATIENT)
Dept: SLEEP CENTER | Facility: CLINIC | Age: 50
End: 2020-03-23

## 2020-03-23 VITALS
HEIGHT: 73 IN | SYSTOLIC BLOOD PRESSURE: 124 MMHG | BODY MASS INDEX: 33.74 KG/M2 | WEIGHT: 254.6 LBS | DIASTOLIC BLOOD PRESSURE: 75 MMHG

## 2020-03-23 DIAGNOSIS — G47.33 OSA (OBSTRUCTIVE SLEEP APNEA): Primary | ICD-10-CM

## 2020-03-23 PROCEDURE — 1036F TOBACCO NON-USER: CPT | Performed by: PSYCHIATRY & NEUROLOGY

## 2020-03-23 PROCEDURE — 99214 OFFICE O/P EST MOD 30 MIN: CPT | Performed by: PSYCHIATRY & NEUROLOGY

## 2020-03-23 PROCEDURE — 3008F BODY MASS INDEX DOCD: CPT | Performed by: PSYCHIATRY & NEUROLOGY

## 2020-03-23 NOTE — PROGRESS NOTES
Sleep Medicine Follow-Up Note    HPI: 55yo M with LEANNE presenting for a compliance visit  Treatment Summary: 2019: HST showed an AMI of 11 7 and gaurav of 84%  APAP 5-15cm recommended  HPI:  Today, patient presents unaccompanied  The patient stated that his CPAP has been working well for him  He has not yet gotten any supplies  He really enjoys his CPAP and he takes it on trips with him  He stated that he feels much more rested, he feels great during the day  He has gotten used to the pressure and no longer uses the ramp         Treatment: APAP  -Pressure: 5-15cm   -Pressure intolerance: no   -Aerophagia: no   -Air Hunger: no  -Interface: NP  -Fit: good  -Chin strap: no  -HCC: YME  -Patient's perceived outcome: see above    Compliance Card Data:    - Date Range: 20-3/17/20   - Settin-15cm   - Pressure: Median 7 2; 90th%ile = 12 5cm   - Residual AHI: 2 3   - %  Night in Large Leak: <2 mins   - Average usage days used: 8h 33m   - % Days used: 100%   - % Days with Usage > 4 hrs: 100%    Respiratory:  -Ongoing Snoring: no  -Mouth Breathing: no  -Dry Mouth: no  -Nocturnal Gasping: no    ROS:   Genitourinary none   Cardiology none   Gastrointestinal none   Neurology none   Constitutional none   Integumentary none   Psychiatry none   Musculoskeletal none   Pulmonary none   ENT none   Endocrine none   Hematological none       Sleep Pattern:  -Position: back  -Bedtime: 10pm  -Lights Out: same time  -Environment: no Lights/TV  -Latency: 15 mins  -Awakenings: none  -Wake Time: 6am  -Rise Time: same time  -Patient's estimate of Sleep Time: 8-10h    Daytime Symptoms:  -Upon Awakening: great, refreshed  -Daytime fatigue/sleepiness: no  -Naps: no  -Involuntary Dozing: no  -Cognitive Symptoms: no  -Driving: Difficulty with sleepiness and driving:  no   -- Close calls related to sleepiness: no   -- Accidents related to sleepiness: no    Substance Use:  -Caffeine: 2 cups of coffee daily  -Alcohol: social  -THC: denied    --> Denies any significant medical changes since last visit  --> Supplemental Oxygen Use: denies    Questionnaire:  Sitting and reading: Would never doze  Watching TV: Slight chance of dozing  Sitting, inactive in a public place (e g  a theatre or a meeting): Would never doze  As a passenger in a car for an hour without a break: Would never doze  Lying down to rest in the afternoon when circumstances permit: Slight chance of dozing  Sitting and talking to someone: Would never doze  Sitting quietly after a lunch without alcohol: Would never doze  In a car, while stopped for a few minutes in traffic: Would never doze  Total score: 2      PE:    /75   Ht 6' 1" (1 854 m)   Wt 115 kg (254 lb 9 6 oz)   BMI 33 59 kg/m²     General:  In NAD  Pul: Respirations: unlaboured  MS: No atrophy  Neuro: No resting tremor  Gait normal turning & station; unremarkable overall  Psych: Socially appropriate  Pleasant  No overt dysphoria  Assessment: The patient has been compliant with his APAP and his obstructive events are well controlled with a residual AHI 2 3  He is deriving benefit from using his APAP as he is less tired than he was in the past and he is now rested in the morning  Will make his pressure closer to his usage on download  Recommendations:    1) APAP at 7-15cm  2) Safe driving reviewed  3) Follow-up 6 months  4) Call with any questions or concerns  All questions answered for the patient, who indicated understanding and agreed with the plan       Bettina Ro MD  Psychiatry/ Sleep medicine

## 2020-03-23 NOTE — TELEPHONE ENCOUNTER
Received a pressure change  Changed accordingly  Patient's now on an APAP 7-15cm  Called patient and informed him

## 2020-03-23 NOTE — PATIENT INSTRUCTIONS
Recommendations:    1) APAP at 7-15cm  2) Safe driving reviewed  3) Follow-up 6 months  4) Call with any questions or concerns

## 2020-03-24 ENCOUNTER — TELEPHONE (OUTPATIENT)
Dept: SLEEP CENTER | Facility: CLINIC | Age: 50
End: 2020-03-24

## 2020-04-07 ENCOUNTER — OFFICE VISIT (OUTPATIENT)
Dept: FAMILY MEDICINE CLINIC | Facility: MEDICAL CENTER | Age: 50
End: 2020-04-07
Payer: COMMERCIAL

## 2020-04-07 VITALS
DIASTOLIC BLOOD PRESSURE: 70 MMHG | HEIGHT: 72 IN | BODY MASS INDEX: 34.18 KG/M2 | TEMPERATURE: 97.7 F | WEIGHT: 252.38 LBS | RESPIRATION RATE: 14 BRPM | SYSTOLIC BLOOD PRESSURE: 112 MMHG | HEART RATE: 80 BPM

## 2020-04-07 DIAGNOSIS — G47.33 OSA (OBSTRUCTIVE SLEEP APNEA): ICD-10-CM

## 2020-04-07 DIAGNOSIS — M51.16 LUMBAR DISC DISEASE WITH RADICULOPATHY: ICD-10-CM

## 2020-04-07 DIAGNOSIS — E78.5 BORDERLINE HYPERLIPIDEMIA: ICD-10-CM

## 2020-04-07 DIAGNOSIS — Z00.00 PREVENTATIVE HEALTH CARE: Primary | ICD-10-CM

## 2020-04-07 DIAGNOSIS — F43.23 ADJUSTMENT REACTION WITH ANXIETY AND DEPRESSION: ICD-10-CM

## 2020-04-07 DIAGNOSIS — K21.9 GASTROESOPHAGEAL REFLUX DISEASE, ESOPHAGITIS PRESENCE NOT SPECIFIED: ICD-10-CM

## 2020-04-07 PROCEDURE — 1036F TOBACCO NON-USER: CPT | Performed by: FAMILY MEDICINE

## 2020-04-07 PROCEDURE — 99396 PREV VISIT EST AGE 40-64: CPT | Performed by: FAMILY MEDICINE

## 2020-04-07 PROCEDURE — 99213 OFFICE O/P EST LOW 20 MIN: CPT | Performed by: FAMILY MEDICINE

## 2020-04-07 PROCEDURE — 3008F BODY MASS INDEX DOCD: CPT | Performed by: FAMILY MEDICINE

## 2020-06-02 DIAGNOSIS — N52.9 ERECTILE DYSFUNCTION, UNSPECIFIED ERECTILE DYSFUNCTION TYPE: ICD-10-CM

## 2020-06-02 RX ORDER — TADALAFIL 5 MG/1
TABLET ORAL
Qty: 30 TABLET | Refills: 7 | Status: SHIPPED | OUTPATIENT
Start: 2020-06-02 | End: 2020-11-11 | Stop reason: SDUPTHER

## 2020-06-04 DIAGNOSIS — K21.9 GASTROESOPHAGEAL REFLUX DISEASE, ESOPHAGITIS PRESENCE NOT SPECIFIED: ICD-10-CM

## 2020-06-04 RX ORDER — OMEPRAZOLE 40 MG/1
CAPSULE, DELAYED RELEASE ORAL
Qty: 90 CAPSULE | Refills: 1 | Status: SHIPPED | OUTPATIENT
Start: 2020-06-04 | End: 2020-12-08 | Stop reason: SDUPTHER

## 2020-09-09 DIAGNOSIS — F43.23 ADJUSTMENT REACTION WITH ANXIETY AND DEPRESSION: ICD-10-CM

## 2020-09-10 RX ORDER — ESCITALOPRAM OXALATE 10 MG/1
TABLET ORAL
Qty: 90 TABLET | Refills: 1 | Status: SHIPPED | OUTPATIENT
Start: 2020-09-10 | End: 2020-12-12 | Stop reason: SDUPTHER

## 2020-11-11 DIAGNOSIS — N52.9 ERECTILE DYSFUNCTION, UNSPECIFIED ERECTILE DYSFUNCTION TYPE: ICD-10-CM

## 2020-11-15 RX ORDER — TADALAFIL 5 MG/1
5 TABLET ORAL DAILY
Qty: 30 TABLET | Refills: 0 | Status: SHIPPED | OUTPATIENT
Start: 2020-11-15 | End: 2021-03-07 | Stop reason: SDUPTHER

## 2020-12-08 DIAGNOSIS — K21.9 GASTROESOPHAGEAL REFLUX DISEASE, UNSPECIFIED WHETHER ESOPHAGITIS PRESENT: Primary | ICD-10-CM

## 2020-12-12 DIAGNOSIS — F43.23 ADJUSTMENT REACTION WITH ANXIETY AND DEPRESSION: ICD-10-CM

## 2020-12-12 RX ORDER — OMEPRAZOLE 40 MG/1
40 CAPSULE, DELAYED RELEASE ORAL DAILY
Qty: 90 CAPSULE | Refills: 0 | Status: SHIPPED | OUTPATIENT
Start: 2020-12-12 | End: 2021-03-07

## 2020-12-15 RX ORDER — ESCITALOPRAM OXALATE 10 MG/1
10 TABLET ORAL DAILY
Qty: 90 TABLET | Refills: 0 | Status: SHIPPED | OUTPATIENT
Start: 2020-12-15 | End: 2021-06-02

## 2021-01-25 DIAGNOSIS — Z23 ENCOUNTER FOR IMMUNIZATION: ICD-10-CM

## 2021-02-03 ENCOUNTER — IMMUNIZATIONS (OUTPATIENT)
Dept: FAMILY MEDICINE CLINIC | Facility: HOSPITAL | Age: 51
End: 2021-02-03

## 2021-02-03 DIAGNOSIS — Z23 ENCOUNTER FOR IMMUNIZATION: Primary | ICD-10-CM

## 2021-02-03 PROCEDURE — 91300 SARS-COV-2 / COVID-19 MRNA VACCINE (PFIZER-BIONTECH) 30 MCG: CPT

## 2021-02-03 PROCEDURE — 0001A SARS-COV-2 / COVID-19 MRNA VACCINE (PFIZER-BIONTECH) 30 MCG: CPT

## 2021-02-24 ENCOUNTER — IMMUNIZATIONS (OUTPATIENT)
Dept: FAMILY MEDICINE CLINIC | Facility: HOSPITAL | Age: 51
End: 2021-02-24

## 2021-02-24 DIAGNOSIS — Z23 ENCOUNTER FOR IMMUNIZATION: Primary | ICD-10-CM

## 2021-02-24 PROCEDURE — 91300 SARS-COV-2 / COVID-19 MRNA VACCINE (PFIZER-BIONTECH) 30 MCG: CPT

## 2021-02-24 PROCEDURE — 0002A SARS-COV-2 / COVID-19 MRNA VACCINE (PFIZER-BIONTECH) 30 MCG: CPT

## 2021-03-07 DIAGNOSIS — N52.9 ERECTILE DYSFUNCTION, UNSPECIFIED ERECTILE DYSFUNCTION TYPE: ICD-10-CM

## 2021-03-07 DIAGNOSIS — K21.9 GASTROESOPHAGEAL REFLUX DISEASE, UNSPECIFIED WHETHER ESOPHAGITIS PRESENT: ICD-10-CM

## 2021-03-07 RX ORDER — OMEPRAZOLE 40 MG/1
CAPSULE, DELAYED RELEASE ORAL
Qty: 90 CAPSULE | Refills: 0 | Status: SHIPPED | OUTPATIENT
Start: 2021-03-07 | End: 2021-06-04

## 2021-03-09 RX ORDER — TADALAFIL 5 MG/1
5 TABLET ORAL DAILY
Qty: 30 TABLET | Refills: 0 | Status: SHIPPED | OUTPATIENT
Start: 2021-03-09 | End: 2021-04-03

## 2021-04-03 DIAGNOSIS — N52.9 ERECTILE DYSFUNCTION, UNSPECIFIED ERECTILE DYSFUNCTION TYPE: ICD-10-CM

## 2021-04-03 RX ORDER — TADALAFIL 5 MG/1
TABLET ORAL
Qty: 30 TABLET | Refills: 0 | Status: SHIPPED | OUTPATIENT
Start: 2021-04-03 | End: 2021-05-02

## 2021-04-30 DIAGNOSIS — N52.9 ERECTILE DYSFUNCTION, UNSPECIFIED ERECTILE DYSFUNCTION TYPE: ICD-10-CM

## 2021-05-02 RX ORDER — TADALAFIL 5 MG/1
TABLET ORAL
Qty: 30 TABLET | Refills: 0 | Status: SHIPPED | OUTPATIENT
Start: 2021-05-02 | End: 2021-06-11 | Stop reason: SDUPTHER

## 2021-05-04 ENCOUNTER — OFFICE VISIT (OUTPATIENT)
Dept: FAMILY MEDICINE CLINIC | Facility: MEDICAL CENTER | Age: 51
End: 2021-05-04
Payer: COMMERCIAL

## 2021-05-04 VITALS
HEIGHT: 73 IN | SYSTOLIC BLOOD PRESSURE: 130 MMHG | WEIGHT: 252 LBS | TEMPERATURE: 97.8 F | BODY MASS INDEX: 33.4 KG/M2 | DIASTOLIC BLOOD PRESSURE: 80 MMHG | HEART RATE: 60 BPM

## 2021-05-04 DIAGNOSIS — F43.23 ADJUSTMENT REACTION WITH ANXIETY AND DEPRESSION: ICD-10-CM

## 2021-05-04 DIAGNOSIS — Z13.29 SCREENING FOR THYROID DISORDER: ICD-10-CM

## 2021-05-04 DIAGNOSIS — R53.83 FATIGUE, UNSPECIFIED TYPE: Primary | ICD-10-CM

## 2021-05-04 DIAGNOSIS — E78.5 BORDERLINE HYPERLIPIDEMIA: ICD-10-CM

## 2021-05-04 DIAGNOSIS — M51.16 LUMBAR DISC DISEASE WITH RADICULOPATHY: ICD-10-CM

## 2021-05-04 DIAGNOSIS — Z23 IMMUNIZATION DUE: ICD-10-CM

## 2021-05-04 DIAGNOSIS — Z00.00 ANNUAL PHYSICAL EXAM: ICD-10-CM

## 2021-05-04 DIAGNOSIS — Z11.4 SCREENING FOR HIV (HUMAN IMMUNODEFICIENCY VIRUS): ICD-10-CM

## 2021-05-04 DIAGNOSIS — K21.9 GASTROESOPHAGEAL REFLUX DISEASE WITHOUT ESOPHAGITIS: ICD-10-CM

## 2021-05-04 DIAGNOSIS — Z13.89 SCREENING FOR BLOOD OR PROTEIN IN URINE: ICD-10-CM

## 2021-05-04 DIAGNOSIS — G47.33 OSA (OBSTRUCTIVE SLEEP APNEA): ICD-10-CM

## 2021-05-04 PROCEDURE — 3725F SCREEN DEPRESSION PERFORMED: CPT | Performed by: NURSE PRACTITIONER

## 2021-05-04 PROCEDURE — 90715 TDAP VACCINE 7 YRS/> IM: CPT | Performed by: NURSE PRACTITIONER

## 2021-05-04 PROCEDURE — 99396 PREV VISIT EST AGE 40-64: CPT | Performed by: NURSE PRACTITIONER

## 2021-05-04 PROCEDURE — 90471 IMMUNIZATION ADMIN: CPT | Performed by: NURSE PRACTITIONER

## 2021-05-04 NOTE — ASSESSMENT & PLAN NOTE
Patient requests a testosterone test for evaluation of levels due to chronic fatigue   He indicates he had discussed with Dr Lamar Medina in the past

## 2021-05-04 NOTE — PATIENT INSTRUCTIONS
Wellness Visit for Adults   AMBULATORY CARE:   A wellness visit  is when you see your healthcare provider to get screened for health problems  Your healthcare provider will also give you advice on how to stay healthy  Write down your questions so you remember to ask them  Ask your healthcare provider how often you should have a wellness visit  What happens at a wellness visit:  Your healthcare provider will ask about your health, and your family history of health problems  This includes high blood pressure, heart disease, and cancer  He or she will ask if you have symptoms that concern you, if you smoke, and about your mood  You may also be asked about your intake of medicines, supplements, food, and alcohol  Any of the following may be done:  · Your weight  will be checked  Your height may also be checked so your body mass index (BMI) can be calculated  Your BMI shows if you are at a healthy weight  · Your blood pressure  and heart rate will be checked  Your temperature may also be checked  · Blood and urine tests  may be done  Blood tests may be done to check your cholesterol levels  Abnormal cholesterol levels increase your risk for heart disease and stroke  You may also need a blood or urine test to check for diabetes if you are at increased risk  Urine tests may be done to look for signs of an infection or kidney disease  · A physical exam  includes checking your heartbeat and lungs with a stethoscope  Your healthcare provider may also check your skin to look for sun damage  · Screening tests  may be recommended  A screening test is done to check for diseases that may not cause symptoms  The screening tests you may need depend on your age, gender, family history, and lifestyle habits  For example, colorectal screening may be recommended if you are 48years old or older  Screening tests you need if you are a woman:   · A Pap smear  is used to screen for cervical cancer   Pap smears are usually done every 3 to 5 years depending on your age  You may need them more often if you have had abnormal Pap smear test results in the past  Ask your healthcare provider how often you should have a Pap smear  · A mammogram  is an x-ray of your breasts to screen for breast cancer  Experts recommend mammograms every 2 years starting at age 48 years  You may need a mammogram at age 52 years or younger if you have an increased risk for breast cancer  Talk to your healthcare provider about when you should start having mammograms and how often you need them  Vaccines you may need:   · Get an influenza vaccine  every year  The influenza vaccine protects you from the flu  Several types of viruses cause the flu  The viruses change over time, so new vaccines are made each year  · Get a tetanus-diphtheria (Td) booster vaccine  every 10 years  This vaccine protects you against tetanus and diphtheria  Tetanus is a severe infection that may cause painful muscle spasms and lockjaw  Diphtheria is a severe bacterial infection that causes a thick covering in the back of your mouth and throat  · Get a human papillomavirus (HPV) vaccine  if you are female and aged 23 to 32 or male 23 to 24 and never received it  This vaccine protects you from HPV infection  HPV is the most common infection spread by sexual contact  HPV may also cause vaginal, penile, and anal cancers  · Get a pneumococcal vaccine  if you are aged 72 years or older  The pneumococcal vaccine is an injection given to protect you from pneumococcal disease  Pneumococcal disease is an infection caused by pneumococcal bacteria  The infection may cause pneumonia, meningitis, or an ear infection  · Get a shingles vaccine  if you are 60 or older, even if you have had shingles before  The shingles vaccine is an injection to protect you from the varicella-zoster virus  This is the same virus that causes chickenpox   Shingles is a painful rash that develops in people who had chickenpox or have been exposed to the virus  How to eat healthy:  My Plate is a model for planning healthy meals  It shows the types and amounts of foods that should go on your plate  Fruits and vegetables make up about half of your plate, and grains and protein make up the other half  A serving of dairy is included on the side of your plate  The amount of calories and serving sizes you need depends on your age, gender, weight, and height  Examples of healthy foods are listed below:  · Eat a variety of vegetables  such as dark green, red, and orange vegetables  You can also include canned vegetables low in sodium (salt) and frozen vegetables without added butter or sauces  · Eat a variety of fresh fruits , canned fruit in 100% juice, frozen fruit, and dried fruit  · Include whole grains  At least half of the grains you eat should be whole grains  Examples include whole-wheat bread, wheat pasta, brown rice, and whole-grain cereals such as oatmeal     · Eat a variety of protein foods such as seafood (fish and shellfish), lean meat, and poultry without skin (turkey and chicken)  Examples of lean meats include pork leg, shoulder, or tenderloin, and beef round, sirloin, tenderloin, and extra lean ground beef  Other protein foods include eggs and egg substitutes, beans, peas, soy products, nuts, and seeds  · Choose low-fat dairy products such as skim or 1% milk or low-fat yogurt, cheese, and cottage cheese  · Limit unhealthy fats  such as butter, hard margarine, and shortening  Exercise:  Exercise at least 30 minutes per day on most days of the week  Some examples of exercise include walking, biking, dancing, and swimming  You can also fit in more physical activity by taking the stairs instead of the elevator or parking farther away from stores  Include muscle strengthening activities 2 days each week  Regular exercise provides many health benefits   It helps you manage your weight, and decreases your risk for type 2 diabetes, heart disease, stroke, and high blood pressure  Exercise can also help improve your mood  Ask your healthcare provider about the best exercise plan for you  General health and safety guidelines:   · Do not smoke  Nicotine and other chemicals in cigarettes and cigars can cause lung damage  Ask your healthcare provider for information if you currently smoke and need help to quit  E-cigarettes or smokeless tobacco still contain nicotine  Talk to your healthcare provider before you use these products  · Limit alcohol  A drink of alcohol is 12 ounces of beer, 5 ounces of wine, or 1½ ounces of liquor  · Lose weight, if needed  Being overweight increases your risk of certain health conditions  These include heart disease, high blood pressure, type 2 diabetes, and certain types of cancer  · Protect your skin  Do not sunbathe or use tanning beds  Use sunscreen with a SPF 15 or higher  Apply sunscreen at least 15 minutes before you go outside  Reapply sunscreen every 2 hours  Wear protective clothing, hats, and sunglasses when you are outside  · Drive safely  Always wear your seatbelt  Make sure everyone in your car wears a seatbelt  A seatbelt can save your life if you are in an accident  Do not use your cell phone when you are driving  This could distract you and cause an accident  Pull over if you need to make a call or send a text message  · Practice safe sex  Use latex condoms if are sexually active and have more than one partner  Your healthcare provider may recommend screening tests for sexually transmitted infections (STIs)  · Wear helmets, lifejackets, and protective gear  Always wear a helmet when you ride a bike or motorcycle, go skiing, or play sports that could cause a head injury  Wear protective equipment when you play sports  Wear a lifejacket when you are on a boat or doing water sports      © Copyright [address] 2020 Information is for End User's use only and may not be sold, redistributed or otherwise used for commercial purposes  All illustrations and images included in CareNotes® are the copyrighted property of A D A M [employer] Inc  or Winston Enriquez  The above information is an  only  It is not intended as medical advice for individual conditions or treatments  Talk to your doctor, nurse or pharmacist before following any medical regimen to see if it is safe and effective for you  Obesity   AMBULATORY CARE:   Obesity  is when your body mass index (BMI) is greater than 30  Your healthcare provider will use your height and weight to measure your BMI  The risks of obesity include  many health problems, such as injuries or physical disability  You may need tests to check for the following:  · Diabetes    · High blood pressure or high cholesterol    · Heart disease    · Gallbladder or liver disease    · Cancer of the colon, breast, prostate, liver, or kidney    · Sleep apnea    · Arthritis or gout    Seek care immediately if:   · You have a severe headache, confusion, or difficulty speaking  · You have weakness on one side of your body  · You have chest pain, sweating, or shortness of breath  Contact your healthcare provider if:   · You have symptoms of gallbladder or liver disease, such as pain in your upper abdomen  · You have knee or hip pain and discomfort while walking  · You have symptoms of diabetes, such as intense hunger and thirst, and frequent urination  · You have symptoms of sleep apnea, such as snoring or daytime sleepiness  · You have questions or concerns about your condition or care  Treatment for obesity  focuses on helping you lose weight to improve your health  Even a small decrease in BMI can reduce the risk for many health problems  Your healthcare provider will help you set a weight-loss goal   · Lifestyle changes  are the first step in treating obesity   These include making healthy food choices and getting regular physical activity  Your healthcare provider may suggest a weight-loss program that involves coaching, education, and therapy  · Medicine  may help you lose weight when it is used with a healthy diet and physical activity  · Surgery  can help you lose weight if you are very obese and have other health problems  There are several types of weight-loss surgery  Ask your healthcare provider for more information  Be successful losing weight:   · Set small, realistic goals  An example of a small goal is to walk for 20 minutes 5 days a week  Anther goal is to lose 5% of your body weight  · Tell friends, family members, and coworkers about your goals  and ask for their support  Ask a friend to lose weight with you, or join a weight-loss support group  · Identify foods or triggers that may cause you to overeat , and find ways to avoid them  Remove tempting high-calorie foods from your home and workplace  Place a bowl of fresh fruit on your kitchen counter  If stress causes you to eat, then find other ways to cope with stress  · Keep a diary to track what you eat and drink  Also write down how many minutes of physical activity you do each day  Weigh yourself once a week and record it in your diary  Eating changes: You will need to eat 500 to 1,000 fewer calories each day than you currently eat to lose 1 to 2 pounds a week  The following changes will help you cut calories:  · Eat smaller portions  Use small plates, no larger than 9 inches in diameter  Fill your plate half full of fruits and vegetables  Measure your food using measuring cups until you know what a serving size looks like  · Eat 3 meals and 1 or 2 snacks each day  Plan your meals in advance  Kaushal Gambino and eat at home most of the time  Eat slowly  Do not skip meals  Skipping meals can lead to overeating later in the day  This can make it harder for you to lose weight   Talk with a dietitian to help you make a meal plan and schedule that is right for you  · Eat fruits and vegetables at every meal   They are low in calories and high in fiber, which makes you feel full  Do not add butter, margarine, or cream sauce to vegetables  Use herbs to season steamed vegetables  · Eat less fat and fewer fried foods  Eat more baked or grilled chicken and fish  These protein sources are lower in calories and fat than red meat  Limit fast food  Dress your salads with olive oil and vinegar instead of bottled dressing  · Limit the amount of sugar you eat  Do not drink sugary beverages  Limit alcohol  Activity changes:  Physical activity is good for your body in many ways  It helps you burn calories and build strong muscles  It decreases stress and depression, and improves your mood  It can also help you sleep better  Talk to your healthcare provider before you begin an exercise program   · Exercise for at least 30 minutes 5 days a week  Start slowly  Set aside time each day for physical activity that you enjoy and that is convenient for you  It is best to do both weight training and an activity that increases your heart rate, such as walking, bicycling, or swimming  · Find ways to be more active  Do yard work and housecleaning  Walk up the stairs instead of using elevators  Spend your leisure time going to events that require walking, such as outdoor festivals or fairs  This extra physical activity can help you lose weight and keep it off  Follow up with your healthcare provider as directed: You may need to meet with a dietitian  Write down your questions so you remember to ask them during your visits  © Copyright [address] Information is for End User's use only and may not be sold, redistributed or otherwise used for commercial purposes   All illustrations and images included in CareNotes® are the copyrighted property of A D A M , Inc  or [facility] Thiago Townsend   The above information is an  only  It is not intended as medical advice for individual conditions or treatments  Talk to your doctor, nurse or pharmacist before following any medical regimen to see if it is safe and effective for you  Cholesterol and Your Health   AMBULATORY CARE:   Cholesterol  is a waxy, fat-like substance  Your body uses cholesterol to make hormones and new cells, and to protect nerves  Cholesterol is made by your body  It also comes from certain foods you eat, such as meat and dairy products  Your healthcare provider can help you set goals for your cholesterol levels  He or she can help you create a plan to meet your goals  Cholesterol level goals: Your cholesterol level goals depend on your risk for heart disease, your age, and your other health conditions  The following are general guidelines:  · Total cholesterol  includes low-density lipoprotein (LDL), high-density lipoprotein (HDL), and triglyceride levels  The total cholesterol level should be lower than 200 mg/dL and is best at about 150 mg/dL  · LDL cholesterol  is called bad cholesterol  because it forms plaque in your arteries  As plaque builds up, your arteries become narrow, and less blood flows through  When plaque decreases blood flow to your heart, you may have chest pain  If plaque completely blocks an artery that brings blood to your heart, you may have a heart attack  Plaque can break off and form blood clots  Blood clots may block arteries in your brain and cause a stroke  The level should be less than 130 mg/dL and is best at about 100 mg/dL  · HDL cholesterol  is called good cholesterol  because it helps remove LDL cholesterol from your arteries  It does this by attaching to LDL cholesterol and carrying it to your liver  Your liver breaks down LDL cholesterol so your body can get rid of it  High levels of HDL cholesterol can help prevent a heart attack and stroke   Low levels of HDL cholesterol can increase your risk for heart disease, heart attack, and stroke  The level should be 60 mg/dL or higher  · Triglycerides  are a type of fat that store energy from foods you eat  High levels of triglycerides also cause plaque buildup  This can increase your risk for a heart attack or stroke  If your triglyceride level is high, your LDL cholesterol level may also be high  The level should be less than 150 mg/dL  What increases your risk for high cholesterol:   · Smoking cigarettes    · Being overweight or obese, or not getting enough exercise    · Drinking large amounts of alcohol    · A medical condition such as hypertension (high blood pressure) or diabetes    · Certain genes passed from your parents to you    · Age older than 65 years    What you need to know about having your cholesterol levels checked: Adults 21to 39years of age should have their cholesterol levels checked every 4 to 6 years  Adults 45 years or older should have their cholesterol checked every 1 to 2 years  You may need your cholesterol checked more often, or at a younger age, if you have risk factors for heart disease  You may also need to have your cholesterol checked more often if you have other health conditions, such as diabetes  Blood tests are used to check cholesterol levels  Blood tests measure your levels of triglycerides, LDL cholesterol, and HDL cholesterol  How healthy fats affect your cholesterol levels:  Healthy fats, also called unsaturated fats, help lower LDL cholesterol and triglyceride levels  Healthy fats include the following:  · Monounsaturated fats  are found in foods such as olive oil, canola oil, avocado, nuts, and olives  · Polyunsaturated fats,  such as omega 3 fats, are found in fish, such as salmon, trout, and tuna  They can also be found in plant foods such as flaxseed, walnuts, and soybeans  How unhealthy fats affect your cholesterol levels:  Unhealthy fats increase LDL cholesterol and triglyceride levels   They are found in foods high in cholesterol, saturated fat, and trans fat:  · Cholesterol  is found in eggs, dairy, and meat  · Saturated fat  is found in butter, cheese, ice cream, whole milk, and coconut oil  Saturated fat is also found in meat, such as sausage, hot dogs, and bologna  · Trans fat  is found in liquid oils and is used in fried and baked foods  Foods that contain trans fats include chips, crackers, muffins, sweet rolls, microwave popcorn, and cookies  Treatment  for high cholesterol will also decrease your risk of heart disease, heart attack, and stroke  Treatment may include any of the following:  · Lifestyle changes  may include food, exercise, weight loss, and quitting smoking  You may also need to decrease the amount of alcohol you drink  Your healthcare provider will want you to start with lifestyle changes  Other treatment may be added if lifestyle changes are not enough  · Medicines  may be given to lower your LDL cholesterol, triglyceride levels, or total cholesterol level  You may need medicines to lower your cholesterol if any of the following is true:     ? You have a history of stroke, TIA, unstable angina, or a heart attack  ? Your LDL cholesterol level is 190 mg/dL or higher  ? You are age 36 to 76 years, have diabetes or heart disease risk factors, and your LDL cholesterol is 70 mg/dL or higher  · Supplements  include fish oil, red yeast rice, and garlic  Fish oil may help lower your triglyceride and LDL cholesterol levels  It may also increase your HDL cholesterol level  Red yeast rice may help decrease your total cholesterol level and LDL cholesterol level  Garlic may help lower your total cholesterol level  Do not take these supplements without talking to your healthcare provider  Food changes you can make to lower your cholesterol levels:  A dietitian can help you create a healthy eating plan   He or she can show you how to read food labels and choose foods low in saturated fat, trans fats, and cholesterol  · Decrease the total amount of fat you eat  Choose lean meats, fat-free or 1% fat milk, and low-fat dairy products, such as yogurt and cheese  Try to limit or avoid red meats  Limit or do not eat fried foods or baked goods, such as cookies  · Replace unhealthy fats with healthy fats  Cook foods in olive oil or canola oil  Choose soft margarines that are low in saturated fat and trans fat  Seeds, nuts, and avocados are other examples of healthy fats  · Eat foods with omega-3 fats  Examples include salmon, tuna, mackerel, walnuts, and flaxseed  Eat fish 2 times per week  Pregnant women should not eat fish that have high levels of mercury, such as shark, swordfish, and nathan mackerel  · Increase the amount of high-fiber foods you eat  High-fiber foods can help lower your LDL cholesterol  Aim to get between 20 and 30 grams of fiber each day  Fruits and vegetables are high in fiber  Eat at least 5 servings each day  Other high-fiber foods are whole-grain or whole-wheat breads, pastas, or cereals, and brown rice  Eat 3 ounces of whole-grain foods each day  Increase fiber slowly  You may have abdominal discomfort, bloating, and gas if you add fiber to your diet too quickly  · Eat healthy protein foods  Examples include low-fat dairy products, skinless chicken and turkey, fish, and nuts  · Limit foods and drinks that are high in sugar  Your dietitian or healthcare provider can help you create daily limits for high-sugar foods and drinks  The limit may be lower if you have diabetes or another health condition  Limits can also help you lose weight if needed  Lifestyle changes you can make to lower your cholesterol levels:   · Maintain a healthy weight  Ask your healthcare provider what a healthy weight is for you  Ask him or her to help you create a weight loss plan if needed  Weight loss can decrease your total cholesterol and triglyceride levels   Weight loss may also help keep your blood pressure at a healthy level  · Exercise regularly  Exercise can help lower your total cholesterol level and maintain a healthy weight  Exercise can also help increase your HDL cholesterol level  Work with your healthcare provider to create an exercise program that is right for you  Get at least 30 to 40 minutes of moderate exercise most days of the week  Examples of exercise include brisk walking, swimming, or biking  · Do not smoke  Nicotine and other chemicals in cigarettes and cigars can raise your cholesterol levels  Ask your healthcare provider for information if you currently smoke and need help to quit  E-cigarettes or smokeless tobacco still contain nicotine  Talk to your healthcare provider before you use these products  · Limit or do not drink alcohol  Alcohol can increase your triglyceride levels  Ask your healthcare provider before you drink alcohol  Ask how much is okay for you to drink in 1 day or 1 week  © Copyright [facility] [address] Information is for End User's use only and may not be sold, redistributed or otherwise used for commercial purposes  All illustrations and images included in CareNotes® are the copyrighted property of [employer]  or [address] [place], [place] [address]jono Townsend   The above information is an  only  It is not intended as medical advice for individual conditions or treatments  Talk to your doctor, nurse or pharmacist before following any medical regimen to see if it is safe and effective for you

## 2021-05-04 NOTE — ASSESSMENT & PLAN NOTE
Patient had had back injection with Dr Adam Oliveira  Patient has had 2 out of 3 injections  He wants to consider lower back surgery  Discussion of stimulator

## 2021-05-04 NOTE — PROGRESS NOTES
ADULT ANNUAL [address]  [facility]    NAME: Jeanenne Osler  AGE: 52 y o  SEX: male  : 1970     DATE: 2021     Assessment and Plan:     Problem List Items Addressed This Visit        Digestive    Esophageal reflux     Patient takes prilosec 40mg p o  daily  GERD diet reviewed  Respiratory    LEANNE (obstructive sleep apnea)     Patient has CPAP he uses at night and sleeps well  Nervous and Auditory    Lumbar disc disease with radiculopathy     Patient had had back injection with Dr Diego Thompson  Patient has had 2 out of 3 injections  He wants to consider lower back surgery  Discussion of stimulator  Other    Adjustment reaction with anxiety and depression     CHIKIS - 7 and PHQ-9 for evalaution  Continues on lexapro 10mg p o  daily  No issues to report  Immunization due     tdap vaccination  Relevant Orders    TDAP VACCINE GREATER THAN OR EQUAL TO 8YO IM (Completed)    Fatigue - Primary     Patient requests a testosterone test for evaluation of levels due to chronic fatigue  He indicates he had discussed with Dr Ying Geller in the past          Relevant Orders    Vitamin D 25 hydroxy    Screening for blood or protein in urine     Screening for protein and blood  Borderline hyperlipidemia     Will recheck lipid panel  Other Visit Diagnoses     Annual physical exam        BMI 33 0-33 9,adult              Immunizations and preventive care screenings were discussed with patient today  Appropriate education was printed on patient's after visit summary  Counseling:  Alcohol/drug use: discussed moderation in alcohol intake, the recommendations for healthy alcohol use, and avoidance of illicit drug use  Dental Health: discussed importance of regular tooth brushing, flossing, and dental visits    Injury prevention: discussed safety/seat belts, safety helmets, smoke detectors, carbon dioxide detectors, and smoking near bedding or upholstery  Sexual health: discussed sexually transmitted diseases, partner selection, use of condoms, avoidance of unintended pregnancy, and contraceptive alternatives  · Exercise: the importance of regular exercise/physical activity was discussed  Recommend exercise 3-5 times per week for at least 30 minutes  BMI Counseling: Body mass index is 33 25 kg/m²  The BMI is above normal  Nutrition recommendations include decreasing portion sizes, encouraging healthy choices of fruits and vegetables, decreasing fast food intake, consuming healthier snacks, limiting drinks that contain sugar, moderation in carbohydrate intake, increasing intake of lean protein, reducing intake of saturated and trans fat and reducing intake of cholesterol  Exercise recommendations include vigorous physical activity 75 minutes/week, exercising 3-5 times per week, obtaining a gym membership and strength training exercises  No pharmacotherapy was ordered  Depression Screening and Follow-up Plan: Patient's depression screening was positive with a PHQ-2 score of 3  Their PHQ-9 score was 13  Patient assessed for underlying major depression  Brief counseling provided and recommend additional follow-up/re-evaluation next office visit  Return in 6 months (on 11/4/2021)  Chief Complaint:     Chief Complaint   Patient presents with    Annual Exam      History of Present Illness:     Adult Annual Physical   Patient here for a comprehensive physical exam  The patient reports no problems  Diet and Physical Activity  · Diet/Nutrition: well balanced diet, limited junk food, low calorie diet, low fat diet, low carb diet and limited fruits/vegetables  · Exercise: no formal exercise        Depression Screening  PHQ-9 Depression Screening    PHQ-9:   Frequency of the following problems over the past two weeks:      Little interest or pleasure in doing things: 1 - several days  Feeling down, depressed, or hopeless: 2 - more than half the days  Trouble falling or staying asleep, or sleeping too much: 0 - not at all  Feeling tired or having little energy: 2 - more than half the days  Poor appetite or overeating: 3 - nearly every day  Feeling bad about yourself - or that you are a failure or have let yourself or your family down: 1 - several days  Trouble concentrating on things, such as reading the newspaper or watching television: 3 - nearly every day  Moving or speaking so slowly that other people could have noticed  Or the opposite - being so fidgety or restless that you have been moving around a lot more than usual: 1 - several days  Thoughts that you would be better off dead, or of hurting yourself in some way: 0 - not at all  PHQ-2 Score: 3  PHQ-9 Score: 13       General Health  · Sleep: sleeps well and gets 7-8 hours of sleep on average  · Hearing: normal - bilateral   · Vision: no vision problems, goes for regular eye exams, most recent eye exam <1 year ago and wears glasses  · Dental: regular dental visits, brushes teeth twice daily and flosses teeth occasionally   Health  · Symptoms include: none     Review of Systems:     Review of Systems   Constitutional: Positive for fatigue  Negative for activity change, appetite change, chills, fever and unexpected weight change  HENT: Negative for congestion, ear discharge, ear pain, facial swelling, mouth sores, nosebleeds, postnasal drip, rhinorrhea, sinus pressure, sinus pain, sneezing, sore throat and voice change  Eyes: Negative for pain, redness and visual disturbance  Respiratory: Negative for apnea, cough, choking, shortness of breath and wheezing  Cardiovascular: Negative for chest pain and palpitations  Gastrointestinal: Negative for abdominal distention, abdominal pain, constipation, diarrhea, nausea and vomiting  Endocrine: Negative      Genitourinary: Negative for decreased urine volume, difficulty urinating, dysuria, frequency, hematuria and urgency  Musculoskeletal: Negative for arthralgias and myalgias  Skin: Negative  Neurological: Negative  Hematological: Negative  Psychiatric/Behavioral: Negative  Past Medical History:     Past Medical History:   Diagnosis Date    Anxiety     GERD (gastroesophageal reflux disease)     OCD (obsessive compulsive disorder)     Umbilical hernia     last assessed 08/30/17      Past Surgical History:     Past Surgical History:   Procedure Laterality Date    ARTHROSCOPY KNEE Left     MD REPAIR UMBILICAL FHPN,6+G/Y,BQZZT N/A 10/27/2017    Procedure: REPAIR HERNIA UMBILICAL with mesh;  Surgeon: Yan Martinez MD;  Location: [facility];  Service: General      Family History:     Family History   Problem Relation Age of Onset    No Known Problems Mother     Other Father         adopted by father      Social History:        Social History     Socioeconomic History    Marital status:      Spouse name: None    Number of children: None    Years of education: None    Highest education level: None   Occupational History    None   Social Needs    Financial resource strain: None    Food insecurity     Worry: None     Inability: None    Transportation needs     Medical: None     Non-medical: None   Tobacco Use    Smoking status: Never Smoker    Smokeless tobacco: Former User   Substance and Sexual Activity    Alcohol use:  Yes     Alcohol/week: 5 0 standard drinks     Types: 5 Cans of beer per week     Frequency: 2-3 times a week    Drug use: Never    Sexual activity: None   Lifestyle    Physical activity     Days per week: None     Minutes per session: None    Stress: None   Relationships    Social connections     Talks on phone: None     Gets together: None     Attends Baptism service: None     Active member of club or organization: None     Attends meetings of clubs or organizations: None     Relationship status: None    Intimate partner violence     Fear of current or ex partner: None     Emotionally abused: None     Physically abused: None     Forced sexual activity: None   Other Topics Concern    None   Social History Narrative    Caffeine use      Current Medications:     Current Outpatient Medications   Medication Sig Dispense Refill    escitalopram (LEXAPRO) 10 mg tablet Take 1 tablet (10 mg total) by mouth daily 90 tablet 0    omeprazole (PriLOSEC) 40 MG capsule TAKE 1 CAPSULE BY MOUTH EVERY DAY 90 capsule 0    tadalafil (CIALIS) 5 MG tablet TAKE 1 TABLET BY MOUTH EVERY DAY 30 tablet 0     No current facility-administered medications for this visit  Allergies: Allergies   Allergen Reactions    Penicillin V Hives      Physical Exam:     /80 (BP Location: Left arm, Patient Position: Sitting, Cuff Size: Large)   Pulse 60   Temp 97 8 °F (36 6 °C)   Ht 6' 1" (1 854 m)   Wt 114 kg (252 lb)   BMI 33 25 kg/m²     Physical Exam  Vitals signs and nursing note reviewed  Exam conducted with a chaperone present  Constitutional:       Appearance: Normal appearance  He is obese  HENT:      Head: Normocephalic and atraumatic  Right Ear: Tympanic membrane, ear canal and external ear normal       Left Ear: Tympanic membrane, ear canal and external ear normal       Nose: Nose normal       Mouth/Throat:      Mouth: Mucous membranes are moist    Eyes:      Extraocular Movements: Extraocular movements intact  Conjunctiva/sclera: Conjunctivae normal       Pupils: Pupils are equal, round, and reactive to light  Neck:      Musculoskeletal: Normal range of motion and neck supple  Cardiovascular:      Rate and Rhythm: Normal rate and regular rhythm  Pulses: Normal pulses  Heart sounds: Normal heart sounds  Pulmonary:      Effort: Pulmonary effort is normal       Breath sounds: Normal breath sounds  Abdominal:      General: Abdomen is flat  Bowel sounds are normal       Palpations: Abdomen is soft     Musculoskeletal: Normal range of motion  Skin:     General: Skin is warm and dry  Capillary Refill: Capillary refill takes less than 2 seconds  Neurological:      General: No focal deficit present  Mental Status: He is alert and oriented to person, place, and time     Psychiatric:         Mood and Affect: Mood normal          Behavior: Behavior normal           CLARICE Burrell  [address]

## 2021-05-14 ENCOUNTER — APPOINTMENT (OUTPATIENT)
Dept: LAB | Facility: MEDICAL CENTER | Age: 51
End: 2021-05-14
Payer: COMMERCIAL

## 2021-05-14 DIAGNOSIS — Z11.4 SCREENING FOR HIV (HUMAN IMMUNODEFICIENCY VIRUS): ICD-10-CM

## 2021-05-14 DIAGNOSIS — R53.83 FATIGUE, UNSPECIFIED TYPE: ICD-10-CM

## 2021-05-14 DIAGNOSIS — Z13.29 SCREENING FOR THYROID DISORDER: ICD-10-CM

## 2021-05-14 DIAGNOSIS — E78.5 BORDERLINE HYPERLIPIDEMIA: ICD-10-CM

## 2021-05-14 LAB
25(OH)D3 SERPL-MCNC: 12.8 NG/ML (ref 30–100)
ALBUMIN SERPL BCP-MCNC: 3.7 G/DL (ref 3.5–5)
ALP SERPL-CCNC: 74 U/L (ref 46–116)
ALT SERPL W P-5'-P-CCNC: 95 U/L (ref 12–78)
ANION GAP SERPL CALCULATED.3IONS-SCNC: 3 MMOL/L (ref 4–13)
AST SERPL W P-5'-P-CCNC: 43 U/L (ref 5–45)
BACTERIA UR QL AUTO: ABNORMAL /HPF
BASOPHILS # BLD AUTO: 0.03 THOUSANDS/ΜL (ref 0–0.1)
BASOPHILS NFR BLD AUTO: 1 % (ref 0–1)
BILIRUB SERPL-MCNC: 0.43 MG/DL (ref 0.2–1)
BILIRUB UR QL STRIP: NEGATIVE
BUN SERPL-MCNC: 19 MG/DL (ref 5–25)
CALCIUM SERPL-MCNC: 9.3 MG/DL (ref 8.3–10.1)
CAOX CRY URNS QL MICRO: ABNORMAL /HPF
CHLORIDE SERPL-SCNC: 108 MMOL/L (ref 100–108)
CHOLEST SERPL-MCNC: 212 MG/DL (ref 50–200)
CLARITY UR: CLEAR
CO2 SERPL-SCNC: 31 MMOL/L (ref 21–32)
COLOR UR: ABNORMAL
CREAT SERPL-MCNC: 1.12 MG/DL (ref 0.6–1.3)
EOSINOPHIL # BLD AUTO: 0.12 THOUSAND/ΜL (ref 0–0.61)
EOSINOPHIL NFR BLD AUTO: 3 % (ref 0–6)
ERYTHROCYTE [DISTWIDTH] IN BLOOD BY AUTOMATED COUNT: 12.8 % (ref 11.6–15.1)
GFR SERPL CREATININE-BSD FRML MDRD: 77 ML/MIN/1.73SQ M
GLUCOSE P FAST SERPL-MCNC: 101 MG/DL (ref 65–99)
GLUCOSE UR STRIP-MCNC: NEGATIVE MG/DL
HCT VFR BLD AUTO: 46.2 % (ref 36.5–49.3)
HDLC SERPL-MCNC: 47 MG/DL
HGB BLD-MCNC: 15 G/DL (ref 12–17)
HGB UR QL STRIP.AUTO: NEGATIVE
IMM GRANULOCYTES # BLD AUTO: 0.02 THOUSAND/UL (ref 0–0.2)
IMM GRANULOCYTES NFR BLD AUTO: 1 % (ref 0–2)
KETONES UR STRIP-MCNC: NEGATIVE MG/DL
LDLC SERPL CALC-MCNC: 136 MG/DL (ref 0–100)
LEUKOCYTE ESTERASE UR QL STRIP: NEGATIVE
LYMPHOCYTES # BLD AUTO: 1.22 THOUSANDS/ΜL (ref 0.6–4.47)
LYMPHOCYTES NFR BLD AUTO: 32 % (ref 14–44)
MCH RBC QN AUTO: 31.4 PG (ref 26.8–34.3)
MCHC RBC AUTO-ENTMCNC: 32.5 G/DL (ref 31.4–37.4)
MCV RBC AUTO: 97 FL (ref 82–98)
MONOCYTES # BLD AUTO: 0.44 THOUSAND/ΜL (ref 0.17–1.22)
MONOCYTES NFR BLD AUTO: 11 % (ref 4–12)
NEUTROPHILS # BLD AUTO: 2.04 THOUSANDS/ΜL (ref 1.85–7.62)
NEUTS SEG NFR BLD AUTO: 52 % (ref 43–75)
NITRITE UR QL STRIP: NEGATIVE
NON-SQ EPI CELLS URNS QL MICRO: ABNORMAL /HPF
NONHDLC SERPL-MCNC: 165 MG/DL
NRBC BLD AUTO-RTO: 0 /100 WBCS
PH UR STRIP.AUTO: 6 [PH]
PLATELET # BLD AUTO: 216 THOUSANDS/UL (ref 149–390)
PMV BLD AUTO: 11.4 FL (ref 8.9–12.7)
POTASSIUM SERPL-SCNC: 4.1 MMOL/L (ref 3.5–5.3)
PROT SERPL-MCNC: 7.3 G/DL (ref 6.4–8.2)
PROT UR STRIP-MCNC: ABNORMAL MG/DL
RBC # BLD AUTO: 4.78 MILLION/UL (ref 3.88–5.62)
RBC #/AREA URNS AUTO: ABNORMAL /HPF
SODIUM SERPL-SCNC: 142 MMOL/L (ref 136–145)
SP GR UR STRIP.AUTO: 1.03 (ref 1–1.03)
TRIGL SERPL-MCNC: 144 MG/DL
TSH SERPL DL<=0.05 MIU/L-ACNC: 1.72 UIU/ML (ref 0.36–3.74)
UROBILINOGEN UR QL STRIP.AUTO: 0.2 E.U./DL
WBC # BLD AUTO: 3.87 THOUSAND/UL (ref 4.31–10.16)
WBC #/AREA URNS AUTO: ABNORMAL /HPF

## 2021-05-14 PROCEDURE — 80061 LIPID PANEL: CPT

## 2021-05-14 PROCEDURE — 84443 ASSAY THYROID STIM HORMONE: CPT

## 2021-05-14 PROCEDURE — 81001 URINALYSIS AUTO W/SCOPE: CPT | Performed by: NURSE PRACTITIONER

## 2021-05-14 PROCEDURE — 84403 ASSAY OF TOTAL TESTOSTERONE: CPT

## 2021-05-14 PROCEDURE — 80053 COMPREHEN METABOLIC PANEL: CPT

## 2021-05-14 PROCEDURE — 85025 COMPLETE CBC W/AUTO DIFF WBC: CPT

## 2021-05-14 PROCEDURE — 84402 ASSAY OF FREE TESTOSTERONE: CPT

## 2021-05-14 PROCEDURE — 36415 COLL VENOUS BLD VENIPUNCTURE: CPT

## 2021-05-14 PROCEDURE — 87389 HIV-1 AG W/HIV-1&-2 AB AG IA: CPT

## 2021-05-14 PROCEDURE — 82306 VITAMIN D 25 HYDROXY: CPT

## 2021-05-15 LAB
TESTOST FREE SERPL-MCNC: 5.7 PG/ML (ref 6.8–21.5)
TESTOST SERPL-MCNC: 133 NG/DL (ref 264–916)

## 2021-05-16 LAB — HIV 1+2 AB+HIV1 P24 AG SERPL QL IA: NORMAL

## 2021-05-17 DIAGNOSIS — E78.5 BORDERLINE HYPERLIPIDEMIA: Primary | ICD-10-CM

## 2021-05-17 DIAGNOSIS — E55.9 VITAMIN D DEFICIENCY: ICD-10-CM

## 2021-05-17 DIAGNOSIS — E29.1 TESTOSTERONE DEFICIENCY IN MALE: ICD-10-CM

## 2021-05-17 DIAGNOSIS — R74.01 ALT (SGPT) LEVEL RAISED: ICD-10-CM

## 2021-05-17 DIAGNOSIS — D72.818 OTHER DECREASED WHITE BLOOD CELL (WBC) COUNT: ICD-10-CM

## 2021-05-17 RX ORDER — MELATONIN
1000 EVERY 12 HOURS
Qty: 180 TABLET | Refills: 1 | Status: SHIPPED | OUTPATIENT
Start: 2021-05-17 | End: 2022-03-29

## 2021-05-17 NOTE — PROGRESS NOTES
Patient called to notify of results  WBC is low will repeat in 1 month  If still remain low we will refer to hematology  Patient's testosterone levels were low so we have referred to endocrinology  Patient's vitamin-D levels are low so we will put him on supplementation at this time and recheck in 1 month  Patient cholesterol levels are elevated he chooses to try to control with diet and exercise at this time  Repeat lipid panel in 1 month

## 2021-05-26 ENCOUNTER — CONSULT (OUTPATIENT)
Dept: ENDOCRINOLOGY | Facility: CLINIC | Age: 51
End: 2021-05-26
Payer: COMMERCIAL

## 2021-05-26 VITALS
HEIGHT: 73 IN | WEIGHT: 264.4 LBS | HEART RATE: 68 BPM | BODY MASS INDEX: 35.04 KG/M2 | SYSTOLIC BLOOD PRESSURE: 126 MMHG | DIASTOLIC BLOOD PRESSURE: 86 MMHG

## 2021-05-26 DIAGNOSIS — E29.1 TESTOSTERONE DEFICIENCY IN MALE: ICD-10-CM

## 2021-05-26 DIAGNOSIS — R73.01 IFG (IMPAIRED FASTING GLUCOSE): ICD-10-CM

## 2021-05-26 DIAGNOSIS — R79.89 LOW TESTOSTERONE IN MALE: Primary | ICD-10-CM

## 2021-05-26 DIAGNOSIS — G47.33 OSA (OBSTRUCTIVE SLEEP APNEA): ICD-10-CM

## 2021-05-26 DIAGNOSIS — R79.89 ELEVATED LFTS: ICD-10-CM

## 2021-05-26 PROCEDURE — 1036F TOBACCO NON-USER: CPT | Performed by: INTERNAL MEDICINE

## 2021-05-26 PROCEDURE — 3008F BODY MASS INDEX DOCD: CPT | Performed by: INTERNAL MEDICINE

## 2021-05-26 PROCEDURE — 99204 OFFICE O/P NEW MOD 45 MIN: CPT | Performed by: INTERNAL MEDICINE

## 2021-05-26 NOTE — PROGRESS NOTES
Brenda Deutsch 52 y o  male MRN: [id number]    Encounter: [id number]      Assessment/Plan     Problem List Items Addressed This Visit        Endocrine    IFG (impaired fasting glucose)    Relevant Orders    HEMOGLOBIN A1C W/ EAG ESTIMATION Lab Collect       Respiratory    LEANNE (obstructive sleep apnea)     Advised follow-up with sleep specialist            Other    Elevated LFTs    Relevant Orders    Transferrin Saturation    Low testosterone in male - Primary     Both total and free testosterone were low earlier in the month  For now will repeat morning and fasting total and free testosterone to confirm as well as check prolactin, LH FSH and thyroid function etc  to workup for etiology  Further management will depend on the results         Relevant Orders    T4, free Lab Collect      Other Visit Diagnoses     Testosterone deficiency in male        Relevant Orders    Testosterone, free, total Lab Collect    Sex Hormone Binding Globulin- Lab Collect    Prolactin Lab Collect    Luteinizing hormone Lab Collect    Follicle stimulating hormone Lab Collect    Transferrin Saturation    T4, free Lab Collect        CC:   Low testosterone     History of Present Illness     HPI:  79-year-old male referred here for evaluation of low testosterone  Been complaining of ED that he initially noted the after starting Lexapro  He has history Chronic back pain - worsening for the past year-never on any medications   Steroid injections a year back-     Complains of Weight gain-40-50 lbs over the past year , fatigue  1 1/2 year back, difficulty focusing     Started lexapro 5 years - noticed ED so has been using cialis   Low libido for the past 2 years     Sleep apnea - uses CPAP for the past year -     Working multiple jobs and coaching       Feels loosing muscle mass   Not taking any herbal /OTC supplementations   Stopped etoh for the past few months   Shaves daily-feels  Facial hair growth is less     No urinary symptoms    No issues with fertility       Review of Systems    Historical Information   Past Medical History:   Diagnosis Date    Anxiety     GERD (gastroesophageal reflux disease)     OCD (obsessive compulsive disorder)     Umbilical hernia     last assessed 08/30/17     Past Surgical History:   Procedure Laterality Date    ARTHROSCOPY KNEE Left     HI REPAIR UMBILICAL PZPB,0+G/O,WDGKY N/A 10/27/2017    Procedure: REPAIR HERNIA UMBILICAL with mesh;  Surgeon: Larry Vences MD;  Location: [facility];  Service: General     Social History   Social History     Substance and Sexual Activity   Alcohol Use Yes    Alcohol/week: 5 0 standard drinks    Types: 5 Cans of beer per week    Frequency: 2-3 times a week     Social History     Substance and Sexual Activity   Drug Use Never     Social History     Tobacco Use   Smoking Status Never Smoker   Smokeless Tobacco Former User     Family History:   Family History   Problem Relation Age of Onset    No Known Problems Mother     Other Father         adopted by father       Meds/Allergies   Current Outpatient Medications   Medication Sig Dispense Refill    cholecalciferol (VITAMIN D3) 1,000 units tablet Take 1 tablet (1,000 Units total) by mouth every 12 (twelve) hours 180 tablet 1    escitalopram (LEXAPRO) 10 mg tablet Take 1 tablet (10 mg total) by mouth daily 90 tablet 0    omeprazole (PriLOSEC) 40 MG capsule TAKE 1 CAPSULE BY MOUTH EVERY DAY 90 capsule 0    tadalafil (CIALIS) 5 MG tablet TAKE 1 TABLET BY MOUTH EVERY DAY 30 tablet 0     No current facility-administered medications for this visit  Allergies   Allergen Reactions    Penicillin V Hives       Objective   Vitals: Blood pressure 126/86, pulse 68, height 6' 1" (1 854 m), weight 120 kg (264 lb 6 4 oz)  Physical Exam  Vitals signs reviewed  Constitutional:       Appearance: Normal appearance  He is obese  He is not ill-appearing or diaphoretic  HENT:      Head: Normocephalic and atraumatic     Eyes: General: No scleral icterus  Extraocular Movements: Extraocular movements intact  Neck:      Musculoskeletal: Neck supple  Cardiovascular:      Rate and Rhythm: Normal rate and regular rhythm  Heart sounds: Normal heart sounds  No murmur  Pulmonary:      Effort: Pulmonary effort is normal  No respiratory distress  Breath sounds: Normal breath sounds  No wheezing or rales  Abdominal:      General: There is no distension  Palpations: Abdomen is soft  Tenderness: There is no abdominal tenderness  There is no guarding  Musculoskeletal:      Right lower leg: No edema  Left lower leg: No edema  Lymphadenopathy:      Cervical: No cervical adenopathy  Skin:     General: Skin is warm and dry  Neurological:      General: No focal deficit present  Mental Status: He is alert and oriented to person, place, and time  Psychiatric:         Mood and Affect: Mood normal          Behavior: Behavior normal          Thought Content: Thought content normal          Judgment: Judgment normal          The history was obtained from the review of the chart, patient  Lab Results:   Lab Results   Component Value Date/Time    Potassium 4 1 05/14/2021 07:17 AM    Chloride 108 05/14/2021 07:17 AM    CO2 31 05/14/2021 07:17 AM    BUN 19 05/14/2021 07:17 AM    Creatinine 1 12 05/14/2021 07:17 AM    Glucose, Fasting 101 (H) 05/14/2021 07:17 AM    Calcium 9 3 05/14/2021 07:17 AM    eGFR 77 05/14/2021 07:17 AM    TSH 3RD GENERATON 1 720 05/14/2021 07:17 AM    Testosterone, Free 5 7 (L) 05/14/2021 07:17 AM             Imaging Studies:       MRI LUMBAR SPINE WITHOUT CONTRAST     IMPRESSION:     1  Degenerative changes without high-grade canal or foraminal stenosis, as detailed      2  Disc bulge and superimposed far right paracentral/subarticular small disc protrusion at level L5-S1 with possible minimal abutment of right S1 nerve root  No canal stenosis    There is mild right foraminal narrowing at this level       I have personally reviewed pertinent reports  Portions of the record may have been created with voice recognition software  Occasional wrong word or "sound a like" substitutions may have occurred due to the inherent limitations of voice recognition software  Read the chart carefully and recognize, using context, where substitutions have occurred

## 2021-05-27 NOTE — ASSESSMENT & PLAN NOTE
Both total and free testosterone were low earlier in the month  For now will repeat morning and fasting total and free testosterone to confirm as well as check prolactin, LH FSH and thyroid function etc  to workup for etiology    Further management will depend on the results

## 2021-05-30 DIAGNOSIS — N52.9 ERECTILE DYSFUNCTION, UNSPECIFIED ERECTILE DYSFUNCTION TYPE: ICD-10-CM

## 2021-06-01 RX ORDER — TADALAFIL 5 MG/1
TABLET ORAL
Qty: 30 TABLET | Refills: 0 | OUTPATIENT
Start: 2021-06-01

## 2021-06-02 ENCOUNTER — LAB (OUTPATIENT)
Dept: LAB | Facility: MEDICAL CENTER | Age: 51
End: 2021-06-02
Payer: COMMERCIAL

## 2021-06-02 DIAGNOSIS — R74.01 ALT (SGPT) LEVEL RAISED: ICD-10-CM

## 2021-06-02 DIAGNOSIS — R73.01 IFG (IMPAIRED FASTING GLUCOSE): ICD-10-CM

## 2021-06-02 DIAGNOSIS — R79.89 LOW TESTOSTERONE IN MALE: ICD-10-CM

## 2021-06-02 DIAGNOSIS — F43.23 ADJUSTMENT REACTION WITH ANXIETY AND DEPRESSION: ICD-10-CM

## 2021-06-02 DIAGNOSIS — R79.89 ELEVATED LFTS: ICD-10-CM

## 2021-06-02 DIAGNOSIS — E78.5 BORDERLINE HYPERLIPIDEMIA: ICD-10-CM

## 2021-06-02 DIAGNOSIS — D72.818 OTHER DECREASED WHITE BLOOD CELL (WBC) COUNT: ICD-10-CM

## 2021-06-02 DIAGNOSIS — K21.9 GASTROESOPHAGEAL REFLUX DISEASE, UNSPECIFIED WHETHER ESOPHAGITIS PRESENT: ICD-10-CM

## 2021-06-02 DIAGNOSIS — E55.9 VITAMIN D DEFICIENCY: ICD-10-CM

## 2021-06-02 DIAGNOSIS — E29.1 TESTOSTERONE DEFICIENCY IN MALE: ICD-10-CM

## 2021-06-02 LAB
25(OH)D3 SERPL-MCNC: 14.2 NG/ML (ref 30–100)
ALBUMIN SERPL BCP-MCNC: 4.2 G/DL (ref 3.5–5)
ALP SERPL-CCNC: 67 U/L (ref 46–116)
ALT SERPL W P-5'-P-CCNC: 88 U/L (ref 12–78)
AST SERPL W P-5'-P-CCNC: 40 U/L (ref 5–45)
BILIRUB DIRECT SERPL-MCNC: 0.17 MG/DL (ref 0–0.2)
BILIRUB SERPL-MCNC: 0.68 MG/DL (ref 0.2–1)
CHOLEST SERPL-MCNC: 202 MG/DL (ref 50–200)
EST. AVERAGE GLUCOSE BLD GHB EST-MCNC: 105 MG/DL
FSH SERPL-ACNC: 4.9 MIU/ML (ref 0.7–10.8)
HBA1C MFR BLD: 5.3 %
HDLC SERPL-MCNC: 38 MG/DL
LDLC SERPL CALC-MCNC: 138 MG/DL (ref 0–100)
LH SERPL-ACNC: 4.6 MIU/ML (ref 1.2–10.6)
NONHDLC SERPL-MCNC: 164 MG/DL
PROLACTIN SERPL-MCNC: 22.9 NG/ML (ref 2.5–17.4)
PROT SERPL-MCNC: 7.5 G/DL (ref 6.4–8.2)
T4 FREE SERPL-MCNC: 0.88 NG/DL (ref 0.76–1.46)
TRANSFERRIN SERPL-MCNC: 261 MG/DL (ref 200–400)
TRIGL SERPL-MCNC: 132 MG/DL
WBC # BLD AUTO: 3.52 THOUSAND/UL (ref 4.31–10.16)

## 2021-06-02 PROCEDURE — 84466 ASSAY OF TRANSFERRIN: CPT

## 2021-06-02 PROCEDURE — 84270 ASSAY OF SEX HORMONE GLOBUL: CPT

## 2021-06-02 PROCEDURE — 82306 VITAMIN D 25 HYDROXY: CPT

## 2021-06-02 PROCEDURE — 36415 COLL VENOUS BLD VENIPUNCTURE: CPT

## 2021-06-02 PROCEDURE — 84402 ASSAY OF FREE TESTOSTERONE: CPT

## 2021-06-02 PROCEDURE — 85048 AUTOMATED LEUKOCYTE COUNT: CPT

## 2021-06-02 PROCEDURE — 80061 LIPID PANEL: CPT

## 2021-06-02 PROCEDURE — 84146 ASSAY OF PROLACTIN: CPT

## 2021-06-02 PROCEDURE — 83002 ASSAY OF GONADOTROPIN (LH): CPT

## 2021-06-02 PROCEDURE — 84403 ASSAY OF TOTAL TESTOSTERONE: CPT

## 2021-06-02 PROCEDURE — 80076 HEPATIC FUNCTION PANEL: CPT

## 2021-06-02 PROCEDURE — 84439 ASSAY OF FREE THYROXINE: CPT

## 2021-06-02 PROCEDURE — 83036 HEMOGLOBIN GLYCOSYLATED A1C: CPT

## 2021-06-02 PROCEDURE — 83001 ASSAY OF GONADOTROPIN (FSH): CPT

## 2021-06-02 RX ORDER — ESCITALOPRAM OXALATE 10 MG/1
TABLET ORAL
Qty: 90 TABLET | Refills: 0 | Status: SHIPPED | OUTPATIENT
Start: 2021-06-02 | End: 2021-08-31

## 2021-06-03 ENCOUNTER — TELEPHONE (OUTPATIENT)
Dept: ENDOCRINOLOGY | Facility: CLINIC | Age: 51
End: 2021-06-03

## 2021-06-03 LAB
SHBG SERPL-SCNC: 23.2 NMOL/L (ref 16.5–55.9)
TESTOST FREE SERPL-MCNC: 9.4 PG/ML (ref 6.8–21.5)
TESTOST SERPL-MCNC: 231 NG/DL (ref 264–916)

## 2021-06-03 NOTE — TELEPHONE ENCOUNTER
----- Message from Omar Looney sent at 6/3/2021  1:11 PM EDT -----  Regarding: FW: Test Results Question  Contact: [phone]    ----- Message -----  From: Eliseo Young  Sent: 6/3/2021  12:46 PM EDT  To: , #  Subject: Test Results Question                            Jarred Martinez,    Is it possible to move my follow up appointment earlier than 6 weeks ?      Eliseo Young  [phone]  [email or web address]

## 2021-06-03 NOTE — TELEPHONE ENCOUNTER
His labs which were drawn yesterday are not all back yet - I will review and then decide if he needs a sooner appointment

## 2021-06-04 DIAGNOSIS — K21.9 GASTROESOPHAGEAL REFLUX DISEASE, UNSPECIFIED WHETHER ESOPHAGITIS PRESENT: ICD-10-CM

## 2021-06-04 RX ORDER — OMEPRAZOLE 40 MG/1
40 CAPSULE, DELAYED RELEASE ORAL DAILY
Qty: 90 CAPSULE | Refills: 0 | OUTPATIENT
Start: 2021-06-04

## 2021-06-04 RX ORDER — OMEPRAZOLE 40 MG/1
CAPSULE, DELAYED RELEASE ORAL
Qty: 90 CAPSULE | Refills: 0 | Status: SHIPPED | OUTPATIENT
Start: 2021-06-04 | End: 2021-08-31

## 2021-06-08 ENCOUNTER — TELEPHONE (OUTPATIENT)
Dept: ENDOCRINOLOGY | Facility: CLINIC | Age: 51
End: 2021-06-08

## 2021-06-08 NOTE — RESULT ENCOUNTER NOTE
Please call the patient regarding labs -total testosterone improved and free testosterone is actually within normal range prolactin is very mildly elevated, not clear if significant    Will monitor

## 2021-06-08 NOTE — TELEPHONE ENCOUNTER
----- Message from Bri Irizarry MD sent at 6/7/2021 10:45 PM EDT -----  Please call the patient regarding labs -total testosterone improved and free testosterone is actually within normal range prolactin is very mildly elevated, not clear if significant    Will monitor

## 2021-06-11 DIAGNOSIS — N52.9 ERECTILE DYSFUNCTION, UNSPECIFIED ERECTILE DYSFUNCTION TYPE: ICD-10-CM

## 2021-06-11 RX ORDER — TADALAFIL 5 MG/1
5 TABLET ORAL DAILY
Qty: 30 TABLET | Refills: 0 | Status: SHIPPED | OUTPATIENT
Start: 2021-06-11 | End: 2021-07-09

## 2021-07-09 DIAGNOSIS — N52.9 ERECTILE DYSFUNCTION, UNSPECIFIED ERECTILE DYSFUNCTION TYPE: ICD-10-CM

## 2021-07-09 RX ORDER — TADALAFIL 5 MG/1
TABLET ORAL
Qty: 30 TABLET | Refills: 0 | Status: SHIPPED | OUTPATIENT
Start: 2021-07-09 | End: 2021-08-05 | Stop reason: SDUPTHER

## 2021-08-05 DIAGNOSIS — N52.9 ERECTILE DYSFUNCTION, UNSPECIFIED ERECTILE DYSFUNCTION TYPE: ICD-10-CM

## 2021-08-05 RX ORDER — TADALAFIL 5 MG/1
TABLET ORAL
Qty: 30 TABLET | Refills: 0 | Status: SHIPPED | OUTPATIENT
Start: 2021-08-05 | End: 2021-09-07

## 2021-08-31 DIAGNOSIS — F43.23 ADJUSTMENT REACTION WITH ANXIETY AND DEPRESSION: ICD-10-CM

## 2021-08-31 DIAGNOSIS — K21.9 GASTROESOPHAGEAL REFLUX DISEASE, UNSPECIFIED WHETHER ESOPHAGITIS PRESENT: ICD-10-CM

## 2021-08-31 RX ORDER — ESCITALOPRAM OXALATE 10 MG/1
TABLET ORAL
Qty: 90 TABLET | Refills: 0 | Status: SHIPPED | OUTPATIENT
Start: 2021-08-31 | End: 2021-11-24

## 2021-08-31 RX ORDER — OMEPRAZOLE 40 MG/1
CAPSULE, DELAYED RELEASE ORAL
Qty: 90 CAPSULE | Refills: 0 | Status: SHIPPED | OUTPATIENT
Start: 2021-08-31 | End: 2021-11-24

## 2021-09-05 DIAGNOSIS — N52.9 ERECTILE DYSFUNCTION, UNSPECIFIED ERECTILE DYSFUNCTION TYPE: ICD-10-CM

## 2021-09-07 RX ORDER — TADALAFIL 5 MG/1
TABLET ORAL
Qty: 30 TABLET | Refills: 0 | Status: SHIPPED | OUTPATIENT
Start: 2021-09-07 | End: 2021-10-04

## 2021-10-02 DIAGNOSIS — N52.9 ERECTILE DYSFUNCTION, UNSPECIFIED ERECTILE DYSFUNCTION TYPE: ICD-10-CM

## 2021-10-04 RX ORDER — TADALAFIL 5 MG/1
TABLET ORAL
Qty: 30 TABLET | Refills: 0 | Status: SHIPPED | OUTPATIENT
Start: 2021-10-04 | End: 2021-11-08

## 2021-11-06 DIAGNOSIS — N52.9 ERECTILE DYSFUNCTION, UNSPECIFIED ERECTILE DYSFUNCTION TYPE: ICD-10-CM

## 2021-11-08 RX ORDER — TADALAFIL 5 MG/1
TABLET ORAL
Qty: 30 TABLET | Refills: 0 | Status: SHIPPED | OUTPATIENT
Start: 2021-11-08 | End: 2021-12-08

## 2021-11-09 ENCOUNTER — OFFICE VISIT (OUTPATIENT)
Dept: FAMILY MEDICINE CLINIC | Facility: MEDICAL CENTER | Age: 51
End: 2021-11-09
Payer: COMMERCIAL

## 2021-11-09 VITALS
HEART RATE: 68 BPM | DIASTOLIC BLOOD PRESSURE: 76 MMHG | TEMPERATURE: 98.1 F | SYSTOLIC BLOOD PRESSURE: 118 MMHG | RESPIRATION RATE: 18 BRPM | WEIGHT: 252 LBS | HEIGHT: 73 IN | BODY MASS INDEX: 33.4 KG/M2

## 2021-11-09 DIAGNOSIS — F43.23 ADJUSTMENT REACTION WITH ANXIETY AND DEPRESSION: ICD-10-CM

## 2021-11-09 DIAGNOSIS — K21.9 GASTROESOPHAGEAL REFLUX DISEASE WITHOUT ESOPHAGITIS: Primary | ICD-10-CM

## 2021-11-09 DIAGNOSIS — R79.89 LOW TESTOSTERONE IN MALE: ICD-10-CM

## 2021-11-09 DIAGNOSIS — F52.21 ERECTILE DYSFUNCTION OF NON-ORGANIC ORIGIN: ICD-10-CM

## 2021-11-09 PROCEDURE — 99214 OFFICE O/P EST MOD 30 MIN: CPT | Performed by: FAMILY MEDICINE

## 2021-11-09 PROCEDURE — 1036F TOBACCO NON-USER: CPT | Performed by: FAMILY MEDICINE

## 2021-11-24 DIAGNOSIS — F43.23 ADJUSTMENT REACTION WITH ANXIETY AND DEPRESSION: ICD-10-CM

## 2021-11-24 DIAGNOSIS — K21.9 GASTROESOPHAGEAL REFLUX DISEASE, UNSPECIFIED WHETHER ESOPHAGITIS PRESENT: ICD-10-CM

## 2021-11-24 RX ORDER — OMEPRAZOLE 40 MG/1
CAPSULE, DELAYED RELEASE ORAL
Qty: 90 CAPSULE | Refills: 0 | Status: SHIPPED | OUTPATIENT
Start: 2021-11-24 | End: 2022-02-22

## 2021-11-24 RX ORDER — ESCITALOPRAM OXALATE 10 MG/1
TABLET ORAL
Qty: 90 TABLET | Refills: 0 | Status: SHIPPED | OUTPATIENT
Start: 2021-11-24 | End: 2021-12-07

## 2021-12-07 ENCOUNTER — OFFICE VISIT (OUTPATIENT)
Dept: FAMILY MEDICINE CLINIC | Facility: MEDICAL CENTER | Age: 51
End: 2021-12-07
Payer: COMMERCIAL

## 2021-12-07 ENCOUNTER — TELEPHONE (OUTPATIENT)
Dept: FAMILY MEDICINE CLINIC | Facility: MEDICAL CENTER | Age: 51
End: 2021-12-07

## 2021-12-07 VITALS
WEIGHT: 250.2 LBS | DIASTOLIC BLOOD PRESSURE: 80 MMHG | HEIGHT: 73 IN | SYSTOLIC BLOOD PRESSURE: 122 MMHG | HEART RATE: 70 BPM | BODY MASS INDEX: 33.16 KG/M2 | TEMPERATURE: 97.6 F | OXYGEN SATURATION: 97 %

## 2021-12-07 DIAGNOSIS — N52.9 ERECTILE DYSFUNCTION, UNSPECIFIED ERECTILE DYSFUNCTION TYPE: ICD-10-CM

## 2021-12-07 DIAGNOSIS — F43.23 ADJUSTMENT REACTION WITH ANXIETY AND DEPRESSION: ICD-10-CM

## 2021-12-07 DIAGNOSIS — R79.89 LOW TESTOSTERONE IN MALE: ICD-10-CM

## 2021-12-07 DIAGNOSIS — F52.21 ERECTILE DYSFUNCTION OF NON-ORGANIC ORIGIN: ICD-10-CM

## 2021-12-07 DIAGNOSIS — R79.89 LOW TESTOSTERONE IN MALE: Primary | ICD-10-CM

## 2021-12-07 PROCEDURE — 99214 OFFICE O/P EST MOD 30 MIN: CPT | Performed by: FAMILY MEDICINE

## 2021-12-07 PROCEDURE — 1036F TOBACCO NON-USER: CPT | Performed by: FAMILY MEDICINE

## 2021-12-07 PROCEDURE — 3008F BODY MASS INDEX DOCD: CPT | Performed by: FAMILY MEDICINE

## 2021-12-07 RX ORDER — TESTOSTERONE 12.5 MG/1.25G
25 GEL TOPICAL DAILY
Qty: 2.5 G | Refills: 3 | Status: SHIPPED | OUTPATIENT
Start: 2021-12-07 | End: 2021-12-15 | Stop reason: SDUPTHER

## 2021-12-08 RX ORDER — TADALAFIL 5 MG/1
TABLET ORAL
Qty: 30 TABLET | Refills: 0 | Status: SHIPPED | OUTPATIENT
Start: 2021-12-08 | End: 2022-01-06

## 2021-12-15 RX ORDER — TESTOSTERONE 12.5 MG/1.25G
25 GEL TOPICAL DAILY
Qty: 2.5 G | Refills: 3 | Status: CANCELLED | OUTPATIENT
Start: 2021-12-15

## 2022-01-06 DIAGNOSIS — N52.9 ERECTILE DYSFUNCTION, UNSPECIFIED ERECTILE DYSFUNCTION TYPE: ICD-10-CM

## 2022-01-06 RX ORDER — TADALAFIL 5 MG/1
TABLET ORAL
Qty: 30 TABLET | Refills: 0 | Status: SHIPPED | OUTPATIENT
Start: 2022-01-06 | End: 2022-01-31

## 2022-01-18 DIAGNOSIS — R79.89 LOW TESTOSTERONE IN MALE: ICD-10-CM

## 2022-01-19 RX ORDER — TESTOSTERONE 12.5 MG/1.25G
25 GEL TOPICAL DAILY
Qty: 2.5 G | Refills: 0 | Status: SHIPPED | OUTPATIENT
Start: 2022-01-19 | End: 2022-02-17 | Stop reason: SDUPTHER

## 2022-01-31 DIAGNOSIS — N52.9 ERECTILE DYSFUNCTION, UNSPECIFIED ERECTILE DYSFUNCTION TYPE: ICD-10-CM

## 2022-01-31 RX ORDER — TADALAFIL 5 MG/1
TABLET ORAL
Qty: 30 TABLET | Refills: 0 | Status: SHIPPED | OUTPATIENT
Start: 2022-01-31 | End: 2022-03-01

## 2022-02-17 ENCOUNTER — APPOINTMENT (OUTPATIENT)
Dept: LAB | Facility: MEDICAL CENTER | Age: 52
End: 2022-02-17
Payer: COMMERCIAL

## 2022-02-17 DIAGNOSIS — R79.89 LOW TESTOSTERONE IN MALE: ICD-10-CM

## 2022-02-17 LAB
FSH SERPL-ACNC: 3.5 MIU/ML (ref 0.7–10.8)
LH SERPL-ACNC: 3.6 MIU/ML (ref 1.2–10.6)

## 2022-02-17 PROCEDURE — 36415 COLL VENOUS BLD VENIPUNCTURE: CPT

## 2022-02-17 PROCEDURE — 84403 ASSAY OF TOTAL TESTOSTERONE: CPT

## 2022-02-17 PROCEDURE — 83002 ASSAY OF GONADOTROPIN (LH): CPT

## 2022-02-17 PROCEDURE — 83001 ASSAY OF GONADOTROPIN (FSH): CPT

## 2022-02-17 PROCEDURE — 84402 ASSAY OF FREE TESTOSTERONE: CPT

## 2022-02-18 LAB
TESTOST FREE SERPL-MCNC: 15.7 PG/ML (ref 6.8–21.5)
TESTOST SERPL-MCNC: 377 NG/DL (ref 264–916)

## 2022-02-18 RX ORDER — TESTOSTERONE 12.5 MG/1.25G
25 GEL TOPICAL DAILY
Qty: 2.5 G | Refills: 0 | Status: SHIPPED | OUTPATIENT
Start: 2022-02-18 | End: 2022-03-19 | Stop reason: SDUPTHER

## 2022-03-01 DIAGNOSIS — N52.9 ERECTILE DYSFUNCTION, UNSPECIFIED ERECTILE DYSFUNCTION TYPE: ICD-10-CM

## 2022-03-01 RX ORDER — TADALAFIL 5 MG/1
TABLET ORAL
Qty: 30 TABLET | Refills: 0 | Status: SHIPPED | OUTPATIENT
Start: 2022-03-01 | End: 2022-03-29

## 2022-03-19 DIAGNOSIS — R79.89 LOW TESTOSTERONE IN MALE: ICD-10-CM

## 2022-03-21 RX ORDER — TESTOSTERONE 12.5 MG/1.25G
25 GEL TOPICAL DAILY
Qty: 2.5 G | Refills: 0 | Status: SHIPPED | OUTPATIENT
Start: 2022-03-21 | End: 2022-04-19 | Stop reason: SDUPTHER

## 2022-03-29 ENCOUNTER — OFFICE VISIT (OUTPATIENT)
Dept: FAMILY MEDICINE CLINIC | Facility: MEDICAL CENTER | Age: 52
End: 2022-03-29
Payer: COMMERCIAL

## 2022-03-29 VITALS
SYSTOLIC BLOOD PRESSURE: 122 MMHG | BODY MASS INDEX: 34.48 KG/M2 | HEIGHT: 73 IN | DIASTOLIC BLOOD PRESSURE: 82 MMHG | TEMPERATURE: 98 F | WEIGHT: 260.2 LBS | OXYGEN SATURATION: 98 % | HEART RATE: 70 BPM

## 2022-03-29 DIAGNOSIS — Z12.5 SCREENING FOR PROSTATE CANCER: ICD-10-CM

## 2022-03-29 DIAGNOSIS — E78.5 BORDERLINE HYPERLIPIDEMIA: Primary | ICD-10-CM

## 2022-03-29 DIAGNOSIS — K21.9 GASTROESOPHAGEAL REFLUX DISEASE WITHOUT ESOPHAGITIS: ICD-10-CM

## 2022-03-29 DIAGNOSIS — Z13.29 SCREENING FOR THYROID DISORDER: ICD-10-CM

## 2022-03-29 DIAGNOSIS — F43.23 ADJUSTMENT REACTION WITH ANXIETY AND DEPRESSION: ICD-10-CM

## 2022-03-29 DIAGNOSIS — F52.21 ERECTILE DYSFUNCTION OF NON-ORGANIC ORIGIN: ICD-10-CM

## 2022-03-29 DIAGNOSIS — Z13.1 SCREENING FOR DIABETES MELLITUS: ICD-10-CM

## 2022-03-29 DIAGNOSIS — N52.9 ERECTILE DYSFUNCTION, UNSPECIFIED ERECTILE DYSFUNCTION TYPE: ICD-10-CM

## 2022-03-29 DIAGNOSIS — R79.89 LOW TESTOSTERONE IN MALE: ICD-10-CM

## 2022-03-29 DIAGNOSIS — Z13.0 SCREENING FOR IRON DEFICIENCY ANEMIA: ICD-10-CM

## 2022-03-29 DIAGNOSIS — Z13.220 SCREENING FOR LIPID DISORDERS: ICD-10-CM

## 2022-03-29 PROCEDURE — 3008F BODY MASS INDEX DOCD: CPT | Performed by: FAMILY MEDICINE

## 2022-03-29 PROCEDURE — 1036F TOBACCO NON-USER: CPT | Performed by: FAMILY MEDICINE

## 2022-03-29 PROCEDURE — 99214 OFFICE O/P EST MOD 30 MIN: CPT | Performed by: FAMILY MEDICINE

## 2022-03-29 RX ORDER — TADALAFIL 5 MG/1
TABLET ORAL
Qty: 30 TABLET | Refills: 0 | Status: SHIPPED | OUTPATIENT
Start: 2022-03-29 | End: 2022-05-02

## 2022-03-30 NOTE — ASSESSMENT & PLAN NOTE
He is taking Cialis 5 mg q day  he is now started testosterone replacement therapy  He is having much less trouble the ED  Continue Cialis, continue TRT

## 2022-03-30 NOTE — ASSESSMENT & PLAN NOTE
He is being treated with testosterone replacement therapy  His recent testosterone an [address] and LH level are all within normal limits  After three months, he tells me that he is having better energy and less problems with ED  he wants to continue it  Continue testosterone, recheck in six months    We before that visit   will be getting a PSA

## 2022-03-30 NOTE — ASSESSMENT & PLAN NOTE
He is not taking an SSRI  He is taking testosterone replacement therapy  He feels less depressed, more energy  Continue TRT

## 2022-03-30 NOTE — ASSESSMENT & PLAN NOTE
We need to check another lipid profile  Especially since we started TRT  Given an order to get lipid profile, we will see him in the fall for a CPX

## 2022-03-30 NOTE — PROGRESS NOTES
Assessment/Plan:    Low testosterone in male  He is being treated with testosterone replacement therapy  His recent testosterone an [address] and LH level are all within normal limits  After three months, he tells me that he is having better energy and less problems with ED  he wants to continue it  Continue testosterone, recheck in six months  We before that visit   will be getting a PSA    Erectile dysfunction of non-organic origin  He is taking Cialis 5 mg q day  he is now started testosterone replacement therapy  He is having much less trouble the ED  Continue Cialis, continue TRT  Borderline hyperlipidemia  We need to check another lipid profile  Especially since we started TRT  Given an order to get lipid profile, we will see him in the fall for a CPX  Adjustment reaction with anxiety and depression  He is not taking an SSRI  He is taking testosterone replacement therapy  He feels less depressed, more energy  Continue TRT  Esophageal reflux  He currently takes omeprazole 40 mg q day  He is not having any breakthrough symptoms  Continue omeprazole, continue lifestyle changes  Problem List Items Addressed This Visit        Digestive    Esophageal reflux     He currently takes omeprazole 40 mg q day  He is not having any breakthrough symptoms  Continue omeprazole, continue lifestyle changes  Other    Erectile dysfunction of non-organic origin     He is taking Cialis 5 mg q day  he is now started testosterone replacement therapy  He is having much less trouble the ED  Continue Cialis, continue TRT  Adjustment reaction with anxiety and depression     He is not taking an SSRI  He is taking testosterone replacement therapy  He feels less depressed, more energy  Continue TRT  Borderline hyperlipidemia - Primary     We need to check another lipid profile  Especially since we started TRT      Given an order to get lipid profile, we will see him in the fall for a CPX  Relevant Orders    Comprehensive metabolic panel    Low testosterone in male     He is being treated with testosterone replacement therapy  His recent testosterone an [address] and LH level are all within normal limits  After three months, he tells me that he is having better energy and less problems with ED  he wants to continue it  Continue testosterone, recheck in six months  We before that visit   will be getting a PSA           Other Visit Diagnoses     Screening for diabetes mellitus        Relevant Orders    Comprehensive metabolic panel    Screening for thyroid disorder        Relevant Orders    TSH, 3rd generation with Free T4 reflex    Screening for lipid disorders        Relevant Orders    Lipid Panel with Direct LDL reflex    Screening for prostate cancer        Relevant Orders    PSA, Total Screen    Screening for iron deficiency anemia        Relevant Orders    CBC and differential            Subjective:      Patient ID: Bebo Woo is a 54 y o  male  The following portions of the patient's history were reviewed and updated as appropriate: allergies, current medications, past family history, past medical history, past social history, past surgical history and problem list     Review of Systems   Constitutional: Negative for chills and fever  HENT: Negative for ear pain and sore throat  Eyes: Negative for pain and visual disturbance  Respiratory: Negative for cough and shortness of breath  Cardiovascular: Negative for chest pain and palpitations  Gastrointestinal: Negative for abdominal pain and vomiting  Genitourinary: Negative for dysuria and hematuria  Musculoskeletal: Negative for arthralgias and back pain  Skin: Negative for color change and rash  Neurological: Negative for seizures and syncope  All other systems reviewed and are negative          Objective:      /82 (BP Location: Left arm, Patient Position: Sitting, Cuff Size: Large)   Pulse 70   Temp 98 °F (36 7 °C)   Ht 6' 1" (1 854 m)   Wt 118 kg (260 lb 3 2 oz)   SpO2 98%   BMI 34 33 kg/m²          Physical Exam  Constitutional:       General: He is not in acute distress  Appearance: He is well-developed  He is not diaphoretic  HENT:      Head: Normocephalic  Mouth/Throat:      Mouth: Mucous membranes are moist    Eyes:      Pupils: Pupils are equal, round, and reactive to light  Cardiovascular:      Rate and Rhythm: Normal rate  Pulses: Normal pulses  Pulmonary:      Effort: Pulmonary effort is normal  No respiratory distress  Musculoskeletal:         General: Normal range of motion  Cervical back: Normal range of motion  Skin:     General: Skin is warm and dry  Neurological:      Mental Status: He is alert and oriented to person, place, and time  Psychiatric:         Behavior: Behavior normal          Thought Content:  Thought content normal          Judgment: Judgment normal

## 2022-03-30 NOTE — ASSESSMENT & PLAN NOTE
He currently takes omeprazole 40 mg q day  He is not having any breakthrough symptoms  Continue omeprazole, continue lifestyle changes

## 2022-05-03 DIAGNOSIS — N52.9 ERECTILE DYSFUNCTION, UNSPECIFIED ERECTILE DYSFUNCTION TYPE: ICD-10-CM

## 2022-05-03 RX ORDER — TADALAFIL 5 MG/1
5 TABLET ORAL DAILY
Qty: 30 TABLET | Refills: 0 | Status: SHIPPED | OUTPATIENT
Start: 2022-05-03 | End: 2022-06-03

## 2022-06-02 DIAGNOSIS — N52.9 ERECTILE DYSFUNCTION, UNSPECIFIED ERECTILE DYSFUNCTION TYPE: ICD-10-CM

## 2022-06-03 RX ORDER — TADALAFIL 5 MG/1
5 TABLET ORAL DAILY
Qty: 30 TABLET | Refills: 0 | Status: SHIPPED | OUTPATIENT
Start: 2022-06-03 | End: 2022-07-01

## 2022-06-27 DIAGNOSIS — R79.89 LOW TESTOSTERONE IN MALE: ICD-10-CM

## 2022-06-27 RX ORDER — TESTOSTERONE 12.5 MG/1.25G
25 GEL TOPICAL DAILY
Qty: 2.5 G | Refills: 0 | Status: SHIPPED | OUTPATIENT
Start: 2022-06-27 | End: 2022-07-26 | Stop reason: SDUPTHER

## 2022-07-01 DIAGNOSIS — N52.9 ERECTILE DYSFUNCTION, UNSPECIFIED ERECTILE DYSFUNCTION TYPE: ICD-10-CM

## 2022-07-01 RX ORDER — TADALAFIL 5 MG/1
5 TABLET ORAL DAILY
Qty: 30 TABLET | Refills: 0 | Status: SHIPPED | OUTPATIENT
Start: 2022-07-01 | End: 2022-07-26 | Stop reason: SDUPTHER

## 2022-07-26 DIAGNOSIS — R79.89 LOW TESTOSTERONE IN MALE: ICD-10-CM

## 2022-07-26 DIAGNOSIS — N52.9 ERECTILE DYSFUNCTION, UNSPECIFIED ERECTILE DYSFUNCTION TYPE: ICD-10-CM

## 2022-07-26 RX ORDER — TADALAFIL 5 MG/1
5 TABLET ORAL DAILY
Qty: 30 TABLET | Refills: 0 | Status: SHIPPED | OUTPATIENT
Start: 2022-07-26 | End: 2022-08-29

## 2022-07-26 RX ORDER — TESTOSTERONE 12.5 MG/1.25G
25 GEL TOPICAL DAILY
Qty: 2.5 G | Refills: 0 | Status: SHIPPED | OUTPATIENT
Start: 2022-07-26

## 2022-07-27 ENCOUNTER — APPOINTMENT (OUTPATIENT)
Dept: LAB | Facility: MEDICAL CENTER | Age: 52
End: 2022-07-27
Payer: COMMERCIAL

## 2022-07-27 DIAGNOSIS — Z13.29 SCREENING FOR THYROID DISORDER: ICD-10-CM

## 2022-07-27 DIAGNOSIS — E78.5 BORDERLINE HYPERLIPIDEMIA: ICD-10-CM

## 2022-07-27 DIAGNOSIS — Z12.5 SCREENING FOR PROSTATE CANCER: ICD-10-CM

## 2022-07-27 DIAGNOSIS — Z13.0 SCREENING FOR IRON DEFICIENCY ANEMIA: ICD-10-CM

## 2022-07-27 DIAGNOSIS — Z13.1 SCREENING FOR DIABETES MELLITUS: ICD-10-CM

## 2022-07-27 DIAGNOSIS — Z13.220 SCREENING FOR LIPID DISORDERS: ICD-10-CM

## 2022-07-27 LAB
ALBUMIN SERPL BCP-MCNC: 3.6 G/DL (ref 3.5–5)
ALP SERPL-CCNC: 56 U/L (ref 46–116)
ALT SERPL W P-5'-P-CCNC: 54 U/L (ref 12–78)
ANION GAP SERPL CALCULATED.3IONS-SCNC: 13 MMOL/L (ref 4–13)
AST SERPL W P-5'-P-CCNC: 32 U/L (ref 5–45)
BASOPHILS # BLD AUTO: 0.04 THOUSANDS/ΜL (ref 0–0.1)
BASOPHILS NFR BLD AUTO: 1 % (ref 0–1)
BILIRUB SERPL-MCNC: 1.34 MG/DL (ref 0.2–1)
BUN SERPL-MCNC: 14 MG/DL (ref 5–25)
CALCIUM SERPL-MCNC: 9.2 MG/DL (ref 8.3–10.1)
CHLORIDE SERPL-SCNC: 107 MMOL/L (ref 96–108)
CHOLEST SERPL-MCNC: 252 MG/DL
CO2 SERPL-SCNC: 18 MMOL/L (ref 21–32)
CREAT SERPL-MCNC: 1.56 MG/DL (ref 0.6–1.3)
EOSINOPHIL # BLD AUTO: 0.12 THOUSAND/ΜL (ref 0–0.61)
EOSINOPHIL NFR BLD AUTO: 3 % (ref 0–6)
ERYTHROCYTE [DISTWIDTH] IN BLOOD BY AUTOMATED COUNT: 12.4 % (ref 11.6–15.1)
GFR SERPL CREATININE-BSD FRML MDRD: 51 ML/MIN/1.73SQ M
GLUCOSE P FAST SERPL-MCNC: 94 MG/DL (ref 65–99)
HCT VFR BLD AUTO: 50 % (ref 36.5–49.3)
HDLC SERPL-MCNC: 22 MG/DL
HGB BLD-MCNC: 15.3 G/DL (ref 12–17)
IMM GRANULOCYTES # BLD AUTO: 0.01 THOUSAND/UL (ref 0–0.2)
IMM GRANULOCYTES NFR BLD AUTO: 0 % (ref 0–2)
LDLC SERPL CALC-MCNC: 205 MG/DL (ref 0–100)
LYMPHOCYTES # BLD AUTO: 1.24 THOUSANDS/ΜL (ref 0.6–4.47)
LYMPHOCYTES NFR BLD AUTO: 28 % (ref 14–44)
MCH RBC QN AUTO: 30.2 PG (ref 26.8–34.3)
MCHC RBC AUTO-ENTMCNC: 30.6 G/DL (ref 31.4–37.4)
MCV RBC AUTO: 99 FL (ref 82–98)
MONOCYTES # BLD AUTO: 0.46 THOUSAND/ΜL (ref 0.17–1.22)
MONOCYTES NFR BLD AUTO: 10 % (ref 4–12)
NEUTROPHILS # BLD AUTO: 2.63 THOUSANDS/ΜL (ref 1.85–7.62)
NEUTS SEG NFR BLD AUTO: 58 % (ref 43–75)
NRBC BLD AUTO-RTO: 0 /100 WBCS
PLATELET # BLD AUTO: 296 THOUSANDS/UL (ref 149–390)
PMV BLD AUTO: 11.2 FL (ref 8.9–12.7)
POTASSIUM SERPL-SCNC: 4.4 MMOL/L (ref 3.5–5.3)
PROT SERPL-MCNC: 7.6 G/DL (ref 6.4–8.4)
PSA SERPL-MCNC: 0.9 NG/ML (ref 0–4)
RBC # BLD AUTO: 5.06 MILLION/UL (ref 3.88–5.62)
SODIUM SERPL-SCNC: 138 MMOL/L (ref 135–147)
TRIGL SERPL-MCNC: 126 MG/DL
TSH SERPL DL<=0.05 MIU/L-ACNC: 2.77 UIU/ML (ref 0.45–4.5)
WBC # BLD AUTO: 4.5 THOUSAND/UL (ref 4.31–10.16)

## 2022-07-27 PROCEDURE — 85025 COMPLETE CBC W/AUTO DIFF WBC: CPT

## 2022-07-27 PROCEDURE — G0103 PSA SCREENING: HCPCS

## 2022-07-27 PROCEDURE — 80053 COMPREHEN METABOLIC PANEL: CPT

## 2022-07-27 PROCEDURE — 84443 ASSAY THYROID STIM HORMONE: CPT

## 2022-07-27 PROCEDURE — 80061 LIPID PANEL: CPT

## 2022-07-27 PROCEDURE — 36415 COLL VENOUS BLD VENIPUNCTURE: CPT

## 2022-08-29 DIAGNOSIS — K21.9 GASTROESOPHAGEAL REFLUX DISEASE, UNSPECIFIED WHETHER ESOPHAGITIS PRESENT: ICD-10-CM

## 2022-08-29 DIAGNOSIS — N52.9 ERECTILE DYSFUNCTION, UNSPECIFIED ERECTILE DYSFUNCTION TYPE: ICD-10-CM

## 2022-08-29 RX ORDER — TADALAFIL 5 MG/1
5 TABLET ORAL DAILY
Qty: 30 TABLET | Refills: 0 | Status: SHIPPED | OUTPATIENT
Start: 2022-08-29 | End: 2022-09-27

## 2022-08-29 RX ORDER — OMEPRAZOLE 40 MG/1
CAPSULE, DELAYED RELEASE ORAL
Qty: 90 CAPSULE | Refills: 1 | Status: SHIPPED | OUTPATIENT
Start: 2022-08-29

## 2022-09-27 DIAGNOSIS — N52.9 ERECTILE DYSFUNCTION, UNSPECIFIED ERECTILE DYSFUNCTION TYPE: ICD-10-CM

## 2022-09-27 RX ORDER — TADALAFIL 5 MG/1
5 TABLET ORAL DAILY
Qty: 30 TABLET | Refills: 0 | Status: SHIPPED | OUTPATIENT
Start: 2022-09-27

## 2022-10-28 DIAGNOSIS — F43.23 ADJUSTMENT REACTION WITH ANXIETY AND DEPRESSION: ICD-10-CM

## 2022-10-28 RX ORDER — ESCITALOPRAM OXALATE 10 MG/1
10 TABLET ORAL DAILY
Qty: 90 TABLET | Refills: 0 | Status: SHIPPED | OUTPATIENT
Start: 2022-10-28 | End: 2023-01-23

## 2022-10-30 DIAGNOSIS — N52.9 ERECTILE DYSFUNCTION, UNSPECIFIED ERECTILE DYSFUNCTION TYPE: ICD-10-CM

## 2022-10-31 RX ORDER — TADALAFIL 5 MG/1
5 TABLET ORAL DAILY
Qty: 30 TABLET | Refills: 0 | Status: SHIPPED | OUTPATIENT
Start: 2022-10-31

## 2022-11-01 ENCOUNTER — RA CDI HCC (OUTPATIENT)
Dept: OTHER | Facility: HOSPITAL | Age: 52
End: 2022-11-01

## 2022-11-08 ENCOUNTER — OFFICE VISIT (OUTPATIENT)
Dept: FAMILY MEDICINE CLINIC | Facility: MEDICAL CENTER | Age: 52
End: 2022-11-08

## 2022-11-08 VITALS
WEIGHT: 264 LBS | BODY MASS INDEX: 34.99 KG/M2 | OXYGEN SATURATION: 98 % | HEART RATE: 88 BPM | DIASTOLIC BLOOD PRESSURE: 82 MMHG | SYSTOLIC BLOOD PRESSURE: 130 MMHG | HEIGHT: 73 IN

## 2022-11-08 DIAGNOSIS — R79.89 LOW TESTOSTERONE IN MALE: ICD-10-CM

## 2022-11-08 DIAGNOSIS — K21.9 GASTROESOPHAGEAL REFLUX DISEASE WITHOUT ESOPHAGITIS: ICD-10-CM

## 2022-11-08 DIAGNOSIS — F43.23 ADJUSTMENT REACTION WITH ANXIETY AND DEPRESSION: ICD-10-CM

## 2022-11-08 DIAGNOSIS — Z00.00 PREVENTATIVE HEALTH CARE: Primary | ICD-10-CM

## 2022-11-08 DIAGNOSIS — Z12.11 SCREENING FOR MALIGNANT NEOPLASM OF COLON: ICD-10-CM

## 2022-11-08 NOTE — PROGRESS NOTES
ADULT ANNUAL [address]  [facility]    NAME: Fani Flowers  AGE: 50 y o  SEX: male  : 1970     DATE: 2022     Assessment and Plan:     Problem List Items Addressed This Visit        Digestive    Esophageal reflux     Omeprazole is doing well for him  He is not having any symptoms when he is taking it  Continue omeprazole, continue dietary modifications, continue weight loss  Other    Adjustment reaction with anxiety and depression     He is taking Lexapro now  It is doing a good job for him, his mood is a lot better, not as much anxiety  It does a better job than testosterone did  Continue Lexapro, continue routine follow-up  Low testosterone in male     We had tried a course of testosterone, we were going to see how it did for him  It did not improve too much in terms of fatigue, depression and muscle growth  We both decided we would discontinue the testosterone  Other Visit Diagnoses     Screening for malignant neoplasm of colon    -  Primary    Relevant Orders    Cologuard          Immunizations and preventive care screenings were discussed with patient today  Appropriate education was printed on patient's after visit summary  Discussed risks and benefits of prostate cancer screening  We discussed the controversial history of PSA screening for prostate cancer in the [place] as well as the risk of over detection and over treatment of prostate cancer by way of PSA screening  The patient understands that PSA blood testing is an imperfect way to screen for prostate cancer and that elevated PSA levels in the blood may also be caused by infection, inflammation, prostatic trauma or manipulation, urological procedures, or by benign prostatic enlargement      The role of the digital rectal examination in prostate cancer screening was also discussed and I discussed with him that there is large interobserver variability in the findings of digital rectal examination  Counseling:  Alcohol/drug use: discussed moderation in alcohol intake, the recommendations for healthy alcohol use, and avoidance of illicit drug use  · Exercise: the importance of regular exercise/physical activity was discussed  Recommend exercise 3-5 times per week for at least 30 minutes  BMI Counseling: Body mass index is 34 83 kg/m²  The BMI is above normal  Nutrition recommendations include decreasing portion sizes, consuming healthier snacks and moderation in carbohydrate intake  Exercise recommendations include moderate physical activity 150 minutes/week and exercising 3-5 times per week  No pharmacotherapy was ordered  Rationale for BMI follow-up plan is due to patient being overweight or obese  No follow-ups on file  Chief Complaint:     Chief Complaint   Patient presents with   • Annual Exam      History of Present Illness:     Adult Annual Physical   Patient here for a comprehensive physical exam  The patient reports no problems  This is a 70-year-old man  He is  and they have two adult children  He is in a relationship with another woman  He works as an  busy shop  He is up-to-date with PSA, he needs a Cologuard  Diet and Physical Activity  · Diet/Nutrition: well balanced diet  · Exercise: strength training exercises and 3-4 times a week on average  Depression Screening  PHQ-2/9 Depression Screening         General Health  · Sleep: sleeps poorly  · Hearing: normal - bilateral   · Vision: no vision problems  · Dental: regular dental visits   Health  · Symptoms include: none     Review of Systems:     Review of Systems   Constitutional: Negative for activity change, fatigue and fever  HENT: Negative for congestion, ear discharge, ear pain, postnasal drip, rhinorrhea, sinus pain, sneezing and sore throat      Eyes: Negative for photophobia, pain, discharge and redness  Respiratory: Negative for apnea, cough, shortness of breath and wheezing  Cardiovascular: Negative for chest pain and palpitations  Gastrointestinal: Negative for abdominal pain, blood in stool, constipation, diarrhea, nausea and vomiting  Endocrine: Negative for polydipsia, polyphagia and polyuria  Genitourinary: Negative for decreased urine volume, difficulty urinating, dysuria, frequency, penile discharge, penile pain and urgency  Musculoskeletal: Negative for arthralgias, gait problem, joint swelling and neck pain  Skin: Negative for color change and rash  Neurological: Negative for dizziness, tremors, seizures, weakness and headaches  Psychiatric/Behavioral: Negative for agitation and sleep disturbance  The patient is not nervous/anxious         Past Medical History:     Past Medical History:   Diagnosis Date   • Anxiety    • GERD (gastroesophageal reflux disease)    • OCD (obsessive compulsive disorder)    • Umbilical hernia     last assessed 08/30/17      Past Surgical History:     Past Surgical History:   Procedure Laterality Date   • ARTHROSCOPY KNEE Left    • HERNIA REPAIR  2017   • KNEE SURGERY  2005   • [place] REPAIR UMBILICAL PNFJ,4+A/Y,RRUWB N/A 10/27/2017    Procedure: REPAIR HERNIA UMBILICAL with mesh;  Surgeon: Wilber De Guzman MD;  Location: [facility];  Service: General      Family History:     Family History   Problem Relation Age of Onset   • No Known Problems Mother    • Other Father         adopted by father   • Diabetes Son    • Diabetes Son       Social History:     Social History     Socioeconomic History   • Marital status:      Spouse name: None   • Number of children: None   • Years of education: None   • Highest education level: None   Occupational History   • None   Tobacco Use   • Smoking status: Never Smoker   • Smokeless tobacco: Former User     Types: Snuff     Quit date: 8/10/2020   Vaping Use   • Vaping Use: Never used   Substance and Sexual Activity   • Alcohol use: Yes     Alcohol/week: 5 0 standard drinks     Types: 5 Cans of beer per week   • Drug use: No   • Sexual activity: Yes     Partners: Female   Other Topics Concern   • None   Social History Narrative    Caffeine use     Social Determinants of Health     Financial Resource Strain: Not on file   Food Insecurity: Not on file   Transportation Needs: Not on file   Physical Activity: Not on file   Stress: Not on file   Social Connections: Not on file   Intimate Partner Violence: Not on file   Housing Stability: Not on file      Current Medications:     Current Outpatient Medications   Medication Sig Dispense Refill   • escitalopram (LEXAPRO) 10 mg tablet Take 1 tablet (10 mg total) by mouth daily 90 tablet 0   • omeprazole (PriLOSEC) 40 MG capsule TAKE 1 CAPSULE BY MOUTH EVERY DAY 90 capsule 1   • tadalafil (CIALIS) 5 MG tablet TAKE 1 TABLET (5 MG TOTAL) BY MOUTH DAILY  30 tablet 0     No current facility-administered medications for this visit  Allergies: Allergies   Allergen Reactions   • Penicillin V Hives      Physical Exam:     /82 (BP Location: Left arm, Patient Position: Sitting, Cuff Size: Large)   Pulse 88   Ht 6' 1" (1 854 m)   Wt 120 kg (264 lb)   SpO2 98%   BMI 34 83 kg/m²     Physical Exam  Vitals and nursing note reviewed  Constitutional:       General: He is not in acute distress  Appearance: Normal appearance  He is well-developed  He is not ill-appearing  HENT:      Head: Normocephalic  Right Ear: Tympanic membrane, ear canal and external ear normal       Left Ear: Tympanic membrane, ear canal and external ear normal       Nose: Nose normal  No rhinorrhea  Mouth/Throat:      Mouth: Mucous membranes are moist       Pharynx: Uvula midline  No oropharyngeal exudate  Tonsils: No tonsillar exudate  Eyes:      General: Lids are normal       Conjunctiva/sclera: Conjunctivae normal       Pupils: Pupils are equal, round, and reactive to light  Neck:      Thyroid: No thyromegaly  Vascular: No carotid bruit  Trachea: Trachea normal    Cardiovascular:      Rate and Rhythm: Normal rate and regular rhythm  Pulses: Normal pulses  Heart sounds: Normal heart sounds, S1 normal and S2 normal  No murmur heard  Pulmonary:      Effort: Pulmonary effort is normal  No respiratory distress  Breath sounds: Normal breath sounds  Abdominal:      General: Bowel sounds are normal       Palpations: Abdomen is soft  Tenderness: There is no abdominal tenderness  Hernia: No hernia is present  Musculoskeletal:         General: Normal range of motion  Cervical back: Normal range of motion and neck supple  Lymphadenopathy:      Cervical: No cervical adenopathy  Skin:     General: Skin is warm and dry  Neurological:      General: No focal deficit present  Mental Status: He is alert and oriented to person, place, and time  Cranial Nerves: No cranial nerve deficit  Sensory: No sensory deficit  Gait: Gait normal       Deep Tendon Reflexes: Reflexes are normal and symmetric  Psychiatric:         Speech: Speech normal          Behavior: Behavior normal  Behavior is cooperative  Thought Content:  Thought content normal           Roge Leal MD  [id number] [facility]

## 2022-11-08 NOTE — ASSESSMENT & PLAN NOTE
We had tried a course of testosterone, we were going to see how it did for him  It did not improve too much in terms of fatigue, depression and muscle growth  We both decided we would discontinue the testosterone

## 2022-11-08 NOTE — ASSESSMENT & PLAN NOTE
He is taking Lexapro now  It is doing a good job for him, his mood is a lot better, not as much anxiety  It does a better job than testosterone did  Continue Lexapro, continue routine follow-up

## 2022-11-08 NOTE — ASSESSMENT & PLAN NOTE
Omeprazole is doing well for him  He is not having any symptoms when he is taking it  Continue omeprazole, continue dietary modifications, continue weight loss

## 2022-11-28 DIAGNOSIS — N52.9 ERECTILE DYSFUNCTION, UNSPECIFIED ERECTILE DYSFUNCTION TYPE: ICD-10-CM

## 2022-11-28 RX ORDER — TADALAFIL 5 MG/1
5 TABLET ORAL DAILY
Qty: 30 TABLET | Refills: 0 | Status: SHIPPED | OUTPATIENT
Start: 2022-11-28

## 2022-12-29 DIAGNOSIS — N52.9 ERECTILE DYSFUNCTION, UNSPECIFIED ERECTILE DYSFUNCTION TYPE: ICD-10-CM

## 2022-12-29 RX ORDER — TADALAFIL 5 MG/1
5 TABLET ORAL DAILY
Qty: 30 TABLET | Refills: 0 | Status: SHIPPED | OUTPATIENT
Start: 2022-12-29 | End: 2022-12-29

## 2022-12-29 RX ORDER — TADALAFIL 5 MG/1
5 TABLET ORAL DAILY
Qty: 30 TABLET | Refills: 0 | Status: SHIPPED | OUTPATIENT
Start: 2022-12-29 | End: 2023-01-04 | Stop reason: SDUPTHER

## 2023-01-04 DIAGNOSIS — N52.9 ERECTILE DYSFUNCTION, UNSPECIFIED ERECTILE DYSFUNCTION TYPE: ICD-10-CM

## 2023-01-04 RX ORDER — TADALAFIL 5 MG/1
5 TABLET ORAL DAILY
Qty: 30 TABLET | Refills: 0 | Status: SHIPPED | OUTPATIENT
Start: 2023-01-04

## 2023-01-23 DIAGNOSIS — F43.23 ADJUSTMENT REACTION WITH ANXIETY AND DEPRESSION: ICD-10-CM

## 2023-01-23 RX ORDER — ESCITALOPRAM OXALATE 10 MG/1
TABLET ORAL
Qty: 90 TABLET | Refills: 0 | Status: SHIPPED | OUTPATIENT
Start: 2023-01-23

## 2023-02-01 DIAGNOSIS — N52.9 ERECTILE DYSFUNCTION, UNSPECIFIED ERECTILE DYSFUNCTION TYPE: ICD-10-CM

## 2023-02-02 RX ORDER — TADALAFIL 5 MG/1
5 TABLET ORAL DAILY
Qty: 30 TABLET | Refills: 0 | Status: SHIPPED | OUTPATIENT
Start: 2023-02-02

## 2023-02-24 DIAGNOSIS — K21.9 GASTROESOPHAGEAL REFLUX DISEASE, UNSPECIFIED WHETHER ESOPHAGITIS PRESENT: ICD-10-CM

## 2023-02-27 RX ORDER — OMEPRAZOLE 40 MG/1
CAPSULE, DELAYED RELEASE ORAL
Qty: 90 CAPSULE | Refills: 1 | Status: SHIPPED | OUTPATIENT
Start: 2023-02-27

## 2023-03-02 DIAGNOSIS — N52.9 ERECTILE DYSFUNCTION, UNSPECIFIED ERECTILE DYSFUNCTION TYPE: ICD-10-CM

## 2023-03-02 RX ORDER — TADALAFIL 5 MG/1
5 TABLET ORAL DAILY
Qty: 30 TABLET | Refills: 0 | Status: SHIPPED | OUTPATIENT
Start: 2023-03-02

## 2023-04-02 DIAGNOSIS — N52.9 ERECTILE DYSFUNCTION, UNSPECIFIED ERECTILE DYSFUNCTION TYPE: ICD-10-CM

## 2023-04-03 RX ORDER — TADALAFIL 5 MG/1
5 TABLET ORAL DAILY
Qty: 30 TABLET | Refills: 0 | Status: SHIPPED | OUTPATIENT
Start: 2023-04-03

## 2023-04-22 DIAGNOSIS — F43.23 ADJUSTMENT REACTION WITH ANXIETY AND DEPRESSION: ICD-10-CM

## 2023-04-24 ENCOUNTER — TELEPHONE (OUTPATIENT)
Dept: FAMILY MEDICINE CLINIC | Facility: MEDICAL CENTER | Age: 53
End: 2023-04-24

## 2023-04-24 ENCOUNTER — HOSPITAL ENCOUNTER (OUTPATIENT)
Dept: RADIOLOGY | Facility: HOSPITAL | Age: 53
Discharge: HOME | End: 2023-04-24

## 2023-04-24 ENCOUNTER — OFFICE VISIT (OUTPATIENT)
Dept: URGENT CARE | Facility: MEDICAL CENTER | Age: 53
End: 2023-04-24

## 2023-04-24 ENCOUNTER — HOSPITAL ENCOUNTER (OUTPATIENT)
Dept: RADIOLOGY | Facility: IMAGING CENTER | Age: 53
Discharge: HOME | End: 2023-04-24

## 2023-04-24 ENCOUNTER — OFFICE VISIT (OUTPATIENT)
Dept: OBGYN CLINIC | Facility: CLINIC | Age: 53
End: 2023-04-24

## 2023-04-24 VITALS
HEIGHT: 73 IN | BODY MASS INDEX: 35.52 KG/M2 | SYSTOLIC BLOOD PRESSURE: 154 MMHG | DIASTOLIC BLOOD PRESSURE: 109 MMHG | WEIGHT: 268 LBS | HEART RATE: 85 BPM | OXYGEN SATURATION: 98 %

## 2023-04-24 VITALS
HEART RATE: 82 BPM | RESPIRATION RATE: 18 BRPM | OXYGEN SATURATION: 96 % | TEMPERATURE: 98.5 F | SYSTOLIC BLOOD PRESSURE: 134 MMHG | BODY MASS INDEX: 35.52 KG/M2 | WEIGHT: 268 LBS | DIASTOLIC BLOOD PRESSURE: 88 MMHG | HEIGHT: 73 IN

## 2023-04-24 DIAGNOSIS — M79.601 RIGHT ARM PAIN: ICD-10-CM

## 2023-04-24 DIAGNOSIS — S46.211A RUPTURE OF RIGHT DISTAL BICEPS TENDON, INITIAL ENCOUNTER: ICD-10-CM

## 2023-04-24 DIAGNOSIS — M79.601 RIGHT ARM PAIN: Primary | ICD-10-CM

## 2023-04-24 DIAGNOSIS — S46.211A RUPTURE OF RIGHT DISTAL BICEPS TENDON, INITIAL ENCOUNTER: Primary | ICD-10-CM

## 2023-04-24 RX ORDER — ESCITALOPRAM OXALATE 10 MG/1
TABLET ORAL
Qty: 90 TABLET | Refills: 0 | Status: SHIPPED | OUTPATIENT
Start: 2023-04-24

## 2023-04-24 NOTE — PATIENT INSTRUCTIONS
Sling to right upper extremity as needed for comfort  No weightbearing through the right upper extremity  Take Tylenol 1,000 mg every 8 hours as needed for pain  Supplement with Motrin 600-800 mg every 8 hours as needed for pain and swelling, alternate every 4 hours with Tylenol  Ice 20 minutes on 20 minutes off as needed  Follow-up with orthopedics ASAP, referral provided today  If symptoms worsen, report to the emergency department immediately

## 2023-04-24 NOTE — PROGRESS NOTES
Assessment/Plan   Diagnoses and all orders for this visit:    Right elbow pain    Suspected rupture of right distal biceps tendon  - MRI elbow, stat  - Ice as needed  - Follow up with Dr Leonardo Anderson, Dr Kingston Corea, Dr Rishi Villegas, Dr Ryann Villarreal or Dr Kimmy Mcconnell this week for a surgical opinion  Subjective   Patient ID: Tigist Fraga is a 50 y o  male  Vitals:    04/24/23 1120   BP: (!) 154/109   Pulse: 85   SpO2: 98%     53yo male comes in for an evaluation of is right elbow  He was injured on 4/22/23 when he tried to lift up a car to get it up onto a trailer  He felt a sudden, loud, and painful pop in the antecubital fossa  He has not been able to use the elbow since then  He saw urgent care earlier this morning and they suspected an acute distal biceps tendon rupture  The pain is dull in character, moderate in severity, pain does not radiate and is not associated with numbness          The following portions of the patient's history were reviewed and updated as appropriate: allergies, current medications, past family history, past medical history, past social history, past surgical history and problem list     Review of Systems  Ortho Exam  Past Medical History:   Diagnosis Date   • Anxiety    • GERD (gastroesophageal reflux disease)    • OCD (obsessive compulsive disorder)    • Umbilical hernia     last assessed 08/30/17     Past Surgical History:   Procedure Laterality Date   • ARTHROSCOPY KNEE Left    • HERNIA REPAIR  2017   • KNEE SURGERY  [id number]   • CO RPR UMBILICAL HRNA 5 YRS/> REDUCIBLE N/A 10/27/2017    Procedure: REPAIR HERNIA UMBILICAL with mesh;  Surgeon: Pastor Davison MD;  Location: [facility];  Service: General     Family History   Problem Relation Age of Onset   • No Known Problems Mother    • Other Father         adopted by father   • Diabetes Son    • Diabetes Son      Social History     Occupational History   • Not on file   Tobacco Use   • Smoking status: Never   • Smokeless tobacco: Former Types: Snuff     Quit date: 8/10/2020   Vaping Use   • Vaping Use: Never used   Substance and Sexual Activity   • Alcohol use: Yes     Alcohol/week: 5 0 standard drinks     Types: 5 Cans of beer per week   • Drug use: No   • Sexual activity: Yes     Partners: Female       Review of Systems   Constitutional: Negative  HENT: Negative  Eyes: Negative  Respiratory: Negative  Cardiovascular: Negative  Gastrointestinal: Negative  Endocrine: Negative  Genitourinary: Negative  Musculoskeletal: As below      Allergic/Immunologic: Negative  Neurological: Negative  Hematological: Negative  Psychiatric/Behavioral: Negative  Objective   Physical Exam      I have personally reviewed pertinent films in PACS and my interpretation is no acute displaced fracture  No avulsion fracture anteriorly       · Constitutional: Awake, Alert, Oriented  · Eyes: EOMI  · Psych: Mood and affect appropriate  · Heart: regular rate   · Lungs: No audible wheezing  · Abdomen: No guarding  · Lymph: no lymphedema      • right Elbow:  - Appearance  • Swelling, no discoloration, no deformity, no ecchymosis and no erythema  - Palpation  • + Tenderness: Anterior elbow  - ROM  • Extension: 20 and Flexion: 100  Pronation 90  Supination 60    - Motor  • limited by pain  - Special Tests  • No instability with valgus / varus stress   - NVI distally

## 2023-04-24 NOTE — PROGRESS NOTES
"[facility]        NAME: Joselyn Coleman is a 50 y o  male  : 1970    MRN: [id number]  DATE: 2023  TIME: 10:31 AM    Assessment and Plan   Rupture of right distal biceps tendon, initial encounter [S46 211A]  1  Rupture of right distal biceps tendon, initial encounter  Ambulatory referral to Orthopedic Surgery      Pt presents with symptoms and examination consistent with distal biceps tendon rupture, discussed OTC analgesics, ice, and pt is placed in sling for comfort  Will follow-up with Francisco Javier Logan PA-C of ortho later today  Referral placed  Patient Instructions     Patient Instructions   Sling to right upper extremity as needed for comfort  No weightbearing through the right upper extremity  Take Tylenol 1,000 mg every 8 hours as needed for pain  Supplement with Motrin 600-800 mg every 8 hours as needed for pain and swelling, alternate every 4 hours with Tylenol  Ice 20 minutes on 20 minutes off as needed  Follow-up with orthopedics ASAP, referral provided today  If symptoms worsen, report to the emergency department immediately  Follow up with PCP in 3-5 days  Proceed to  ER if symptoms worsen  Chief Complaint     Chief Complaint   Patient presents with   • Arm Pain     Pt  C/O right upper arm pain that began two night ago after attempting to lift a car onto a trailer  Pt  With swelling and ecchymosis to the bicep area  States he heard it rip  History of Present Illness       50year old male presents concerns for biceps tear  He reports he was team lifting a [employer] onto a trailer on Saturday night when he felt a pop and tear at his elbow  He states he has not been \"able to make a muscle\" with the biceps since that time  He reports the area immediately swelled up  He has not taken anything for the pain at this time  Pt denies numbness and tingling distally  Pain is rated as an 7/10 and is described as a burning sensation         Review of Systems " "  Review of Systems   Skin: Positive for color change  Neurological: Positive for weakness  Current Medications       Current Outpatient Medications:   •  escitalopram (LEXAPRO) 10 mg tablet, TAKE 1 TABLET BY MOUTH EVERY DAY, Disp: 90 tablet, Rfl: 0  •  omeprazole (PriLOSEC) 40 MG capsule, TAKE 1 CAPSULE BY MOUTH EVERY DAY, Disp: 90 capsule, Rfl: 1  •  tadalafil (CIALIS) 5 MG tablet, TAKE 1 TABLET (5 MG TOTAL) BY MOUTH DAILY  , Disp: 30 tablet, Rfl: 0    Current Allergies     Allergies as of 04/24/2023 - Reviewed 04/24/2023   Allergen Reaction Noted   • Penicillin v Hives 10/25/2017            The following portions of the patient's history were reviewed and updated as appropriate: allergies, current medications, past family history, past medical history, past social history, past surgical history and problem list      Past Medical History:   Diagnosis Date   • Anxiety    • GERD (gastroesophageal reflux disease)    • OCD (obsessive compulsive disorder)    • Umbilical hernia     last assessed 08/30/17       Past Surgical History:   Procedure Laterality Date   • ARTHROSCOPY KNEE Left    • HERNIA REPAIR  2017   • KNEE SURGERY  2005   • [place] REPAIR UMBILICAL VOMZ,1+I/J,IBERX N/A 10/27/2017    Procedure: REPAIR HERNIA UMBILICAL with mesh;  Surgeon: Zack Mahan MD;  Location: [facility];  Service: General       Family History   Problem Relation Age of Onset   • No Known Problems Mother    • Other Father         adopted by father   • Diabetes Son    • Diabetes Son          Medications have been verified  Objective   /88   Pulse 82   Temp 98 5 °F (36 9 °C)   Resp 18   Ht 6' 1\" (1 854 m)   Wt 122 kg (268 lb)   SpO2 96%   BMI 35 36 kg/m²   No LMP for male patient  Physical Exam     Physical Exam  Vitals and nursing note reviewed  Constitutional:       General: He is awake  He is not in acute distress  Appearance: Normal appearance  He is well-developed and well-groomed   He is not " "ill-appearing, toxic-appearing or diaphoretic  HENT:      Head: Normocephalic and atraumatic  Right Ear: Hearing and external ear normal       Left Ear: Hearing and external ear normal    Eyes:      General: Lids are normal  Vision grossly intact  Gaze aligned appropriately  Cardiovascular:      Rate and Rhythm: Normal rate  Pulmonary:      Effort: Pulmonary effort is normal       Comments: Patient is speaking in full sentences with no increased respiratory effort  No audible wheezing or stridor  Musculoskeletal:      Right upper arm: Swelling, edema, deformity (obdulia) and tenderness (of the biceps) present  No lacerations or bony tenderness  Left upper arm: Normal       Right elbow: No swelling, deformity, effusion or lacerations  Normal range of motion  No tenderness  Right forearm: Swelling present  Cervical back: Normal range of motion  Comments: Pt with eccymosis over the ulnar aspect of the elbow and proximal forearm with associated soft tissue swelling  He has ecchymosis of the biceps as well  1/5 strength with elbow flexion  Sensation of the forearm intact with intact radial and ulnar pulses  Distal biceps tendon not palpable  Skin:     General: Skin is warm and dry  Neurological:      Mental Status: He is alert and oriented to person, place, and time  Coordination: Coordination is intact  Gait: Gait is intact  Psychiatric:         Attention and Perception: Attention and perception normal          Mood and Affect: Mood and affect normal          Speech: Speech normal          Behavior: Behavior normal  Behavior is cooperative  Note: Portions of this record may have been created with voice recognition software  Occasional wrong word or \"sound a like\" substitutions may have occurred due to the inherent limitations of voice recognition software  Please read the chart carefully and recognize, using context, where substitutions have occurred  *      "

## 2023-04-24 NOTE — TELEPHONE ENCOUNTER
Pt walked in stating he's positive he tore his bicep over the weekend, he said he saw your car in the parking lot so he knew you were here  Pt stated he lifted his son's car and felt it tear  Pt did not seek care anywhere since then, but wanted to know what you thought he should do  I explained to pt he would either need an appt or I could put up a msg to you  Pt went over to the  to be seen

## 2023-04-25 ENCOUNTER — OFFICE VISIT (OUTPATIENT)
Dept: OBGYN CLINIC | Facility: MEDICAL CENTER | Age: 53
End: 2023-04-25

## 2023-04-25 VITALS
HEART RATE: 71 BPM | DIASTOLIC BLOOD PRESSURE: 87 MMHG | HEIGHT: 73 IN | WEIGHT: 267 LBS | BODY MASS INDEX: 35.39 KG/M2 | SYSTOLIC BLOOD PRESSURE: 127 MMHG

## 2023-04-25 DIAGNOSIS — S46.211A RUPTURE OF RIGHT DISTAL BICEPS TENDON, INITIAL ENCOUNTER: Primary | ICD-10-CM

## 2023-04-25 RX ORDER — CLINDAMYCIN PHOSPHATE 900 MG/50ML
900 INJECTION INTRAVENOUS ONCE
Status: CANCELLED | OUTPATIENT
Start: 2023-05-01 | End: 2023-04-25

## 2023-04-25 RX ORDER — CHLORHEXIDINE GLUCONATE 4 G/100ML
SOLUTION TOPICAL DAILY PRN
Status: CANCELLED | OUTPATIENT
Start: 2023-04-25

## 2023-04-25 RX ORDER — CHLORHEXIDINE GLUCONATE 0.12 MG/ML
15 RINSE ORAL ONCE
Status: CANCELLED | OUTPATIENT
Start: 2023-04-25 | End: 2023-04-25

## 2023-04-25 NOTE — PROGRESS NOTES
[facility] - DEVAN L KRAKAU [facility] CARE SPECIALISTS [place] [address] [place] [id number]       Michael Jackson  [id number]  1970    ORTHOPAEDIC SURGERY OUTPATIENT NOTE  4/25/2023      HISTORY:  50 y o  male  Presents for initial evaluation of his right bicep  He reports on 4/22/2023 he was trying to lift a car to put it on a trailer and felt a pop in his right elbow  He has pain and weakness since then  He has bruising in the arm  No numbness or tingling in the upper extremity  Right-hand-dominant  Past Medical History:   Diagnosis Date   • Anxiety    • GERD (gastroesophageal reflux disease)    • OCD (obsessive compulsive disorder)    • Umbilical hernia     last assessed 08/30/17       Past Surgical History:   Procedure Laterality Date   • ARTHROSCOPY KNEE Left    • HERNIA REPAIR  2017   • KNEE SURGERY  [id number]   • [place] RPR UMBILICAL HRNA 5 YRS/> REDUCIBLE N/A 10/27/2017    Procedure: REPAIR HERNIA UMBILICAL with mesh;  Surgeon: Kayla Viera MD;  Location: [facility] OR;  Service: General       Social History     Socioeconomic History   • Marital status:      Spouse name: Not on file   • Number of children: Not on file   • Years of education: Not on file   • Highest education level: Not on file   Occupational History   • Not on file   Tobacco Use   • Smoking status: Never   • Smokeless tobacco: Former     Types: Snuff     Quit date: 8/10/2020   Vaping Use   • Vaping Use: Never used   Substance and Sexual Activity   • Alcohol use:  Yes     Alcohol/week: 5 0 standard drinks     Types: 5 Cans of beer per week   • Drug use: No   • Sexual activity: Yes     Partners: Female   Other Topics Concern   • Not on file   Social History Narrative    Caffeine use     Social Determinants of Health     Financial Resource Strain: Not on file   Food Insecurity: Not on file   Transportation Needs: Not on file   Physical Activity: Not on file   Stress: Not on file   Social Connections: Not on "file   Intimate Partner Violence: Not on file   Housing Stability: Not on file       Family History   Problem Relation Age of Onset   • No Known Problems Mother    • Other Father         adopted by father   • Diabetes Son    • Diabetes Son         Patient's Medications   New Prescriptions    No medications on file   Previous Medications    ESCITALOPRAM (LEXAPRO) 10 MG TABLET    TAKE 1 TABLET BY MOUTH EVERY DAY    OMEPRAZOLE (PRILOSEC) 40 MG CAPSULE    TAKE 1 CAPSULE BY MOUTH EVERY DAY    TADALAFIL (CIALIS) 5 MG TABLET    TAKE 1 TABLET (5 MG TOTAL) BY MOUTH DAILY  Modified Medications    No medications on file   Discontinued Medications    No medications on file       Allergies   Allergen Reactions   • Penicillin V Hives        /87   Pulse 71   Ht 6' 1\" (1 854 m)   Wt 121 kg (267 lb)   BMI 35 23 kg/m²      REVIEW OF SYSTEMS:  Constitutional: Negative  HEENT: Negative  Respiratory: Negative  Skin: Negative  Neurological: Negative  Psychiatric/Behavioral: Negative  Musculoskeletal: Negative except for that mentioned in the HPI  /87   Pulse 71   Ht 6' 1\" (1 854 m)   Wt 121 kg (267 lb)   BMI 35 23 kg/m²   Gen: No acute distress, resting comfortably in bed  HEENT: Eyes clear, moist mucus membranes, hearing intact  Respiratory: No audible wheezing or stridor  Cardiovascular: Well Perfused peripherally, 2+ distal pulse  Abdomen: nondistended, no peritoneal signs     PHYSICAL EXAM:    Right elbow:  Ecchymosis of the forearm  4/5 supination  Painful resisted supination  Reverse pop-eye deformity  TTP over biceps tendon  Sensation intact    IMAGING:  MRI of the right elbow demonstrates retracted distal biceps tear    ASSESSMENT AND PLAN:  50 y o  male with right distal biceps rupture  This is an acute injury  We discussed with the patient that we recommend surgical intervention in the form of right distal biceps repair  Postoperative course was discussed        The patient understands " the risks and benefits of the procedure with risks including pain, stiffness, infection, neurovascular injury, recurrence of symptoms, failure of surgical procedure, inadvertent intraoperative complications, blood loss, blood clots, allergic reaction to anesthesia, stroke, heart attack, all up to and including to death  The patient understood and did consent for surgery today         Scribe Attestation      I,:  Jerral Mohs, PA-C am acting as a scribe while in the presence of the attending physician :       I,:  Bhargavi Darden personally performed the services described in this documentation    as scribed in my presence :

## 2023-04-25 NOTE — H&P (VIEW-ONLY)
[facility] - DEVAN L KRAKAU [facility] CARE SPECIALISTS [facility] MARCY [place] [address] [place] [id number]       Brii Ledbetter  [id number]  1970    ORTHOPAEDIC SURGERY OUTPATIENT NOTE  4/25/2023      HISTORY:  50 y o  male  Presents for initial evaluation of his right bicep  He reports on 4/22/2023 he was trying to lift a car to put it on a trailer and felt a pop in his right elbow  He has pain and weakness since then  He has bruising in the arm  No numbness or tingling in the upper extremity  Right-hand-dominant  Past Medical History:   Diagnosis Date    Anxiety     GERD (gastroesophageal reflux disease)     OCD (obsessive compulsive disorder)     Umbilical hernia     last assessed 08/30/17       Past Surgical History:   Procedure Laterality Date    ARTHROSCOPY KNEE Left     HERNIA REPAIR  2017    KNEE SURGERY  0759    [place] RPR UMBILICAL HRNA 5 YRS/> REDUCIBLE N/A 10/27/2017    Procedure: REPAIR HERNIA UMBILICAL with mesh;  Surgeon: Clance Aase, MD;  Location: [facility] OR;  Service: General       Social History     Socioeconomic History    Marital status:      Spouse name: Not on file    Number of children: Not on file    Years of education: Not on file    Highest education level: Not on file   Occupational History    Not on file   Tobacco Use    Smoking status: Never    Smokeless tobacco: Former     Types: Snuff     Quit date: 8/10/2020   Vaping Use    Vaping Use: Never used   Substance and Sexual Activity    Alcohol use:  Yes     Alcohol/week: 5 0 standard drinks     Types: 5 Cans of beer per week    Drug use: No    Sexual activity: Yes     Partners: Female   Other Topics Concern    Not on file   Social History Narrative    Caffeine use     Social Determinants of Health     Financial Resource Strain: Not on file   Food Insecurity: Not on file   Transportation Needs: Not on file   Physical Activity: Not on file   Stress: Not on file   Social Connections: Not on "file   Intimate Partner Violence: Not on file   Housing Stability: Not on file       Family History   Problem Relation Age of Onset    No Known Problems Mother     Other Father         adopted by father    Diabetes Son     Diabetes Son         Patient's Medications   New Prescriptions    No medications on file   Previous Medications    ESCITALOPRAM (LEXAPRO) 10 MG TABLET    TAKE 1 TABLET BY MOUTH EVERY DAY    OMEPRAZOLE (PRILOSEC) 40 MG CAPSULE    TAKE 1 CAPSULE BY MOUTH EVERY DAY    TADALAFIL (CIALIS) 5 MG TABLET    TAKE 1 TABLET (5 MG TOTAL) BY MOUTH DAILY  Modified Medications    No medications on file   Discontinued Medications    No medications on file       Allergies   Allergen Reactions    Penicillin V Hives        /87   Pulse 71   Ht 6' 1\" (1 854 m)   Wt 121 kg (267 lb)   BMI 35 23 kg/m²      REVIEW OF SYSTEMS:  Constitutional: Negative  HEENT: Negative  Respiratory: Negative  Skin: Negative  Neurological: Negative  Psychiatric/Behavioral: Negative  Musculoskeletal: Negative except for that mentioned in the HPI  /87   Pulse 71   Ht 6' 1\" (1 854 m)   Wt 121 kg (267 lb)   BMI 35 23 kg/m²   Gen: No acute distress, resting comfortably in bed  HEENT: Eyes clear, moist mucus membranes, hearing intact  Respiratory: No audible wheezing or stridor  Cardiovascular: Well Perfused peripherally, 2+ distal pulse  Abdomen: nondistended, no peritoneal signs     PHYSICAL EXAM:    Right elbow:  Ecchymosis of the forearm  4/5 supination  Painful resisted supination  Reverse pop-eye deformity  TTP over biceps tendon  Sensation intact    IMAGING:  MRI of the right elbow demonstrates retracted distal biceps tear    ASSESSMENT AND PLAN:  50 y o  male with right distal biceps rupture  This is an acute injury  We discussed with the patient that we recommend surgical intervention in the form of right distal biceps repair  Postoperative course was discussed        The patient understands " the risks and benefits of the procedure with risks including pain, stiffness, infection, neurovascular injury, recurrence of symptoms, failure of surgical procedure, inadvertent intraoperative complications, blood loss, blood clots, allergic reaction to anesthesia, stroke, heart attack, all up to and including to death  The patient understood and did consent for surgery today         Scribe Attestation      I,:  Doug Moore PA-C am acting as a scribe while in the presence of the attending physician :       I,:  Lawson Almendarez personally performed the services described in this documentation    as scribed in my presence :

## 2023-04-26 NOTE — PRE-PROCEDURE INSTRUCTIONS
Pre-Surgery Instructions:   Medication Instructions    escitalopram (LEXAPRO) 10 mg tablet Take day of surgery   omeprazole (PriLOSEC) 40 MG capsule Take day of surgery   tadalafil (CIALIS) 5 MG tablet Take day of surgery  INSTR ON MISTY CALL,  REPORT LOC , BRING PHOTO ID/MED LIST/INS  INFO ,SHOWER REV , STOP ASA/NSAID/VIT 7 DAY PREOP, PT VERBALIZES UNDERSTANDING W/ NO FURTHER QUESTIONS

## 2023-04-27 ENCOUNTER — APPOINTMENT (OUTPATIENT)
Dept: LAB | Facility: MEDICAL CENTER | Age: 53
End: 2023-04-27

## 2023-04-27 DIAGNOSIS — Z01.818 ENCOUNTER FOR PREADMISSION TESTING: ICD-10-CM

## 2023-04-27 DIAGNOSIS — S46.211A RUPTURE OF RIGHT DISTAL BICEPS TENDON, INITIAL ENCOUNTER: ICD-10-CM

## 2023-04-27 LAB
ANION GAP SERPL CALCULATED.3IONS-SCNC: 3 MMOL/L (ref 4–13)
BASOPHILS # BLD AUTO: 0.03 THOUSANDS/ΜL (ref 0–0.1)
BASOPHILS NFR BLD AUTO: 1 % (ref 0–1)
BUN SERPL-MCNC: 15 MG/DL (ref 5–25)
CALCIUM SERPL-MCNC: 8.9 MG/DL (ref 8.3–10.1)
CHLORIDE SERPL-SCNC: 105 MMOL/L (ref 96–108)
CO2 SERPL-SCNC: 30 MMOL/L (ref 21–32)
CREAT SERPL-MCNC: 1.09 MG/DL (ref 0.6–1.3)
EOSINOPHIL # BLD AUTO: 0.09 THOUSAND/ΜL (ref 0–0.61)
EOSINOPHIL NFR BLD AUTO: 2 % (ref 0–6)
ERYTHROCYTE [DISTWIDTH] IN BLOOD BY AUTOMATED COUNT: 13.2 % (ref 11.6–15.1)
GFR SERPL CREATININE-BSD FRML MDRD: 78 ML/MIN/1.73SQ M
GLUCOSE P FAST SERPL-MCNC: 82 MG/DL (ref 65–99)
HCT VFR BLD AUTO: 50.8 % (ref 36.5–49.3)
HGB BLD-MCNC: 16.4 G/DL (ref 12–17)
IMM GRANULOCYTES # BLD AUTO: 0.03 THOUSAND/UL (ref 0–0.2)
IMM GRANULOCYTES NFR BLD AUTO: 1 % (ref 0–2)
LYMPHOCYTES # BLD AUTO: 1.26 THOUSANDS/ΜL (ref 0.6–4.47)
LYMPHOCYTES NFR BLD AUTO: 23 % (ref 14–44)
MCH RBC QN AUTO: 32.5 PG (ref 26.8–34.3)
MCHC RBC AUTO-ENTMCNC: 32.3 G/DL (ref 31.4–37.4)
MCV RBC AUTO: 101 FL (ref 82–98)
MONOCYTES # BLD AUTO: 0.69 THOUSAND/ΜL (ref 0.17–1.22)
MONOCYTES NFR BLD AUTO: 13 % (ref 4–12)
NEUTROPHILS # BLD AUTO: 3.36 THOUSANDS/ΜL (ref 1.85–7.62)
NEUTS SEG NFR BLD AUTO: 60 % (ref 43–75)
NRBC BLD AUTO-RTO: 0 /100 WBCS
PLATELET # BLD AUTO: 229 THOUSANDS/UL (ref 149–390)
PMV BLD AUTO: 11.1 FL (ref 8.9–12.7)
POTASSIUM SERPL-SCNC: 4 MMOL/L (ref 3.5–5.3)
RBC # BLD AUTO: 5.04 MILLION/UL (ref 3.88–5.62)
SODIUM SERPL-SCNC: 138 MMOL/L (ref 135–147)
WBC # BLD AUTO: 5.46 THOUSAND/UL (ref 4.31–10.16)

## 2023-05-01 ENCOUNTER — APPOINTMENT (OUTPATIENT)
Dept: RADIOLOGY | Facility: HOSPITAL | Age: 53
End: 2023-05-01

## 2023-05-01 ENCOUNTER — ANESTHESIA EVENT (OUTPATIENT)
Dept: PERIOP | Facility: HOSPITAL | Age: 53
End: 2023-05-01

## 2023-05-01 ENCOUNTER — ANESTHESIA (OUTPATIENT)
Dept: PERIOP | Facility: HOSPITAL | Age: 53
End: 2023-05-01

## 2023-05-01 ENCOUNTER — HOSPITAL ENCOUNTER (OUTPATIENT)
Facility: HOSPITAL | Age: 53
Setting detail: OUTPATIENT SURGERY
Discharge: HOME | End: 2023-05-01
Attending: ORTHOPAEDIC SURGERY | Admitting: ORTHOPAEDIC SURGERY

## 2023-05-01 VITALS
HEIGHT: 73 IN | WEIGHT: 270 LBS | DIASTOLIC BLOOD PRESSURE: 85 MMHG | OXYGEN SATURATION: 94 % | HEART RATE: 67 BPM | BODY MASS INDEX: 35.78 KG/M2 | RESPIRATION RATE: 18 BRPM | TEMPERATURE: 97.3 F | SYSTOLIC BLOOD PRESSURE: 128 MMHG

## 2023-05-01 DIAGNOSIS — S46.211A RUPTURE OF RIGHT DISTAL BICEPS TENDON, INITIAL ENCOUNTER: Primary | ICD-10-CM

## 2023-05-01 LAB
ANION GAP SERPL CALCULATED.3IONS-SCNC: 7 MMOL/L (ref 4–13)
BUN SERPL-MCNC: 16 MG/DL (ref 5–25)
CALCIUM SERPL-MCNC: 8.4 MG/DL (ref 8.4–10.2)
CHLORIDE SERPL-SCNC: 102 MMOL/L (ref 96–108)
CO2 SERPL-SCNC: 30 MMOL/L (ref 21–32)
CREAT SERPL-MCNC: 1.11 MG/DL (ref 0.6–1.3)
GFR SERPL CREATININE-BSD FRML MDRD: 77 ML/MIN/1.73SQ M
GLUCOSE P FAST SERPL-MCNC: 88 MG/DL (ref 65–99)
GLUCOSE SERPL-MCNC: 88 MG/DL (ref 65–140)
POTASSIUM SERPL-SCNC: 4 MMOL/L (ref 3.5–5.3)
SODIUM SERPL-SCNC: 139 MMOL/L (ref 135–147)

## 2023-05-01 DEVICE — IMPL DELIVERY SYS,DISTAL BICEPS REPR
Type: IMPLANTABLE DEVICE | Site: ARM | Status: FUNCTIONAL
Brand: ARTHREX®

## 2023-05-01 RX ORDER — OXYCODONE HYDROCHLORIDE 5 MG/1
5 TABLET ORAL EVERY 6 HOURS PRN
Qty: 30 TABLET | Refills: 0 | Status: SHIPPED | OUTPATIENT
Start: 2023-05-01

## 2023-05-01 RX ORDER — KETOROLAC TROMETHAMINE 30 MG/ML
INJECTION, SOLUTION INTRAMUSCULAR; INTRAVENOUS AS NEEDED
Status: DISCONTINUED | OUTPATIENT
Start: 2023-05-01 | End: 2023-05-01

## 2023-05-01 RX ORDER — SODIUM CHLORIDE, SODIUM LACTATE, POTASSIUM CHLORIDE, CALCIUM CHLORIDE 600; 310; 30; 20 MG/100ML; MG/100ML; MG/100ML; MG/100ML
INJECTION, SOLUTION INTRAVENOUS CONTINUOUS PRN
Status: DISCONTINUED | OUTPATIENT
Start: 2023-05-01 | End: 2023-05-01

## 2023-05-01 RX ORDER — MAGNESIUM HYDROXIDE 1200 MG/15ML
LIQUID ORAL AS NEEDED
Status: DISCONTINUED | OUTPATIENT
Start: 2023-05-01 | End: 2023-05-01 | Stop reason: HOSPADM

## 2023-05-01 RX ORDER — BUPIVACAINE HYDROCHLORIDE 5 MG/ML
INJECTION, SOLUTION PERINEURAL
Status: COMPLETED | OUTPATIENT
Start: 2023-05-01 | End: 2023-05-01

## 2023-05-01 RX ORDER — CHLORHEXIDINE GLUCONATE 4 G/100ML
SOLUTION TOPICAL DAILY PRN
Status: DISCONTINUED | OUTPATIENT
Start: 2023-05-01 | End: 2023-05-01 | Stop reason: HOSPADM

## 2023-05-01 RX ORDER — MIDAZOLAM HYDROCHLORIDE 2 MG/2ML
INJECTION, SOLUTION INTRAMUSCULAR; INTRAVENOUS AS NEEDED
Status: DISCONTINUED | OUTPATIENT
Start: 2023-05-01 | End: 2023-05-01

## 2023-05-01 RX ORDER — PROPOFOL 10 MG/ML
INJECTION, EMULSION INTRAVENOUS AS NEEDED
Status: DISCONTINUED | OUTPATIENT
Start: 2023-05-01 | End: 2023-05-01

## 2023-05-01 RX ORDER — CHLORHEXIDINE GLUCONATE 0.12 MG/ML
15 RINSE ORAL ONCE
Status: COMPLETED | OUTPATIENT
Start: 2023-05-01 | End: 2023-05-01

## 2023-05-01 RX ORDER — DEXAMETHASONE SODIUM PHOSPHATE 10 MG/ML
INJECTION, SOLUTION INTRAMUSCULAR; INTRAVENOUS AS NEEDED
Status: DISCONTINUED | OUTPATIENT
Start: 2023-05-01 | End: 2023-05-01

## 2023-05-01 RX ORDER — ONDANSETRON 2 MG/ML
4 INJECTION INTRAMUSCULAR; INTRAVENOUS ONCE AS NEEDED
Status: DISCONTINUED | OUTPATIENT
Start: 2023-05-01 | End: 2023-05-01 | Stop reason: HOSPADM

## 2023-05-01 RX ORDER — FENTANYL CITRATE 50 UG/ML
INJECTION, SOLUTION INTRAMUSCULAR; INTRAVENOUS AS NEEDED
Status: DISCONTINUED | OUTPATIENT
Start: 2023-05-01 | End: 2023-05-01

## 2023-05-01 RX ORDER — CLINDAMYCIN PHOSPHATE 900 MG/50ML
INJECTION INTRAVENOUS AS NEEDED
Status: DISCONTINUED | OUTPATIENT
Start: 2023-05-01 | End: 2023-05-01

## 2023-05-01 RX ORDER — SODIUM CHLORIDE, SODIUM LACTATE, POTASSIUM CHLORIDE, CALCIUM CHLORIDE 600; 310; 30; 20 MG/100ML; MG/100ML; MG/100ML; MG/100ML
100 INJECTION, SOLUTION INTRAVENOUS CONTINUOUS
OUTPATIENT
Start: 2023-05-01

## 2023-05-01 RX ORDER — FENTANYL CITRATE/PF 50 MCG/ML
25 SYRINGE (ML) INJECTION
Status: DISCONTINUED | OUTPATIENT
Start: 2023-05-01 | End: 2023-05-01 | Stop reason: HOSPADM

## 2023-05-01 RX ORDER — HYDROMORPHONE HCL/PF 1 MG/ML
0.2 SYRINGE (ML) INJECTION
Status: DISCONTINUED | OUTPATIENT
Start: 2023-05-01 | End: 2023-05-01 | Stop reason: HOSPADM

## 2023-05-01 RX ORDER — CLINDAMYCIN PHOSPHATE 900 MG/50ML
900 INJECTION INTRAVENOUS ONCE
Status: DISCONTINUED | OUTPATIENT
Start: 2023-05-01 | End: 2023-05-01 | Stop reason: HOSPADM

## 2023-05-01 RX ORDER — LIDOCAINE HYDROCHLORIDE 10 MG/ML
INJECTION, SOLUTION EPIDURAL; INFILTRATION; INTRACAUDAL; PERINEURAL AS NEEDED
Status: DISCONTINUED | OUTPATIENT
Start: 2023-05-01 | End: 2023-05-01

## 2023-05-01 RX ORDER — CLINDAMYCIN HYDROCHLORIDE 300 MG/1
300 CAPSULE ORAL 4 TIMES DAILY
Qty: 8 CAPSULE | Refills: 0 | Status: SHIPPED | OUTPATIENT
Start: 2023-05-01 | End: 2023-05-03

## 2023-05-01 RX ORDER — EPHEDRINE SULFATE 50 MG/ML
INJECTION INTRAVENOUS AS NEEDED
Status: DISCONTINUED | OUTPATIENT
Start: 2023-05-01 | End: 2023-05-01

## 2023-05-01 RX ADMIN — EPHEDRINE SULFATE 10 MG: 50 INJECTION, SOLUTION INTRAVENOUS at 16:02

## 2023-05-01 RX ADMIN — DEXAMETHASONE SODIUM PHOSPHATE 10 MG: 10 INJECTION, SOLUTION INTRAMUSCULAR; INTRAVENOUS at 15:42

## 2023-05-01 RX ADMIN — MIDAZOLAM HYDROCHLORIDE 2 MG: 1 INJECTION, SOLUTION INTRAMUSCULAR; INTRAVENOUS at 14:48

## 2023-05-01 RX ADMIN — SODIUM CHLORIDE, SODIUM LACTATE, POTASSIUM CHLORIDE, AND CALCIUM CHLORIDE: .6; .31; .03; .02 INJECTION, SOLUTION INTRAVENOUS at 15:21

## 2023-05-01 RX ADMIN — PROPOFOL 250 MG: 10 INJECTION, EMULSION INTRAVENOUS at 15:42

## 2023-05-01 RX ADMIN — CHLORHEXIDINE GLUCONATE 0.12% ORAL RINSE 15 ML: 1.2 LIQUID ORAL at 14:36

## 2023-05-01 RX ADMIN — FENTANYL CITRATE 100 MCG: 50 INJECTION, SOLUTION INTRAMUSCULAR; INTRAVENOUS at 15:42

## 2023-05-01 RX ADMIN — KETOROLAC TROMETHAMINE 15 MG: 30 INJECTION, SOLUTION INTRAMUSCULAR; INTRAVENOUS at 16:31

## 2023-05-01 RX ADMIN — BUPIVACAINE HYDROCHLORIDE 15 ML: 5 INJECTION, SOLUTION PERINEURAL at 14:45

## 2023-05-01 RX ADMIN — CLINDAMYCIN PHOSPHATE 900 MG: 18 INJECTION, SOLUTION INTRAMUSCULAR; INTRAVENOUS at 15:41

## 2023-05-01 RX ADMIN — LIDOCAINE HYDROCHLORIDE 50 MG: 10 INJECTION, SOLUTION EPIDURAL; INFILTRATION; INTRACAUDAL; PERINEURAL at 15:42

## 2023-05-01 NOTE — DISCHARGE INSTR - AVS FIRST PAGE
Jordana Lucas DO    Orthopedic Surgery, Shoulder/Elbow and Sports Medicine  [facility]   [address] [place], [place] [facility], [address] [facility]  Phone: [phone]    General Post-op Surgical Instructions:    Date of Procedure - 05/01/23     Procedure - Right distal biceps repair    Weight Bearing Status - nonweightbearing right arm, no lifting right arm    DVT Prophylaxis and Duration - not needed    PT/OT Instructions - to be discussed at first post op    Stitches/Staples - Will be removed at 7-10 days postop; we will then place steristrips on incision site    Wound Care - maintain dressing, keep clean and dry  Xray follow up - to be done at or prior to office visit follow-up if indicated    Additional Info - Please complete your course of antibiotics     Any questions or concerns please call [phone] please!

## 2023-05-01 NOTE — ANESTHESIA PREPROCEDURE EVALUATION
Procedure:  REPAIR TENDON BICEPS- right and all necessary procedures (Right: Arm Upper)    Relevant Problems   CARDIO   (+) Borderline hyperlipidemia      GI/HEPATIC   (+) Esophageal reflux      NEURO/PSYCH   (+) Chronic pain syndrome      PULMONARY   (+) LEANNE (obstructive sleep apnea)        Physical Exam    Airway    Mallampati score: II  TM Distance: >3 FB  Neck ROM: full     Dental   No notable dental hx     Cardiovascular  Cardiovascular exam normal    Pulmonary  Pulmonary exam normal     Other Findings        Anesthesia Plan  ASA Score- 2     Anesthesia Type- general with ASA Monitors  Additional Monitors:   Airway Plan: LMA  Plan Factors-Exercise tolerance (METS): >4 METS  Chart reviewed  Imaging results reviewed  Existing labs reviewed  Patient summary reviewed  Patient is not a current smoker  Obstructive sleep apnea risk education given perioperatively  Induction-     Postoperative Plan-     Informed Consent- Anesthetic plan and risks discussed with patient  I personally reviewed this patient with the CRNA  Discussed and agreed on the Anesthesia Plan with the BALAJI Enriquez

## 2023-05-01 NOTE — OP NOTE
OPERATIVE REPORT  PATIENT NAME: Kim Parker    :  1970  MRN: [id number]  Pt Location: [facility] OR ROOM 01    SURGERY DATE: 2023    Surgeon(s) and Role:     * Jordana Lucas - Primary     * Tim Morelos PA-C - Assisting    Preop Diagnosis:  Rupture of right distal biceps tendon, initial encounter [S46 211A]    Post-Op Diagnosis Codes:     * Rupture of right distal biceps tendon, initial encounter [X37 712R]    Procedure(s):  Right - REPAIR TENDON BICEPS- right and all necessary procedures    Specimen(s):  * No specimens in log *    Estimated Blood Loss:   Minimal    Drains:  * No LDAs found *    Anesthesia Type:   General w/ Regional    Operative Indications:  Rupture of right distal biceps tendon, initial encounter [Y08 895L]      Operative Findings:  Distal bicep tendon rupture    Complications:   None    Procedure and Technique:  The patient was seen in the preoperative holding area where the operative extremity was marked  The patient was taken to the operating room placed in the supine position  The operative extremity was prepped and draped in the usual sterile fashion  A time-out was called and the patient was administered clindamycin 100 mg IV prior to incision  Using a 15 blade knife a skin incision was made over the radial tuberosity  Subcutaneous dissection taken down to the distal biceps tendon which was digitally palpated and taken out of the wound  The lateral antebrachial cutaneous nerve was identified and protected throughout the case  The distal biceps tendon was debrided of scar tissue at the distal end to allow the tendon to be passed through an 7 mm sized tunnel  An Arthrex FiberLoop stitch was then passed through the distal end of the tendon in a running locking stitch  The 2 strands of the FiberLoop stitch was then passed through an Endobutton  Attention was then brought to the radial tuberosity which was identified and retractors were placed ulnarly and radially    The tendon stump was debrided off the radial tuberosity using a rongeur  Under fluoroscopic guidance a 2 4 mm pin was placed center center on the radial tuberosity  An 7 5 mm Reamer was then used to ream the near cortex  Copious irrigation was placed in the wound and suction out to remove bone debris  A 3 2 mm spayed tip drill was then used to drill the far cortex ulnarly away for the posterior interosseous nerve while the hand was supinated  The Endobutton was then passed through the far cortex and flipped and the 2 strands of the suture were attention to reduce the distal biceps tendon into the bone tunnel  This was then provisionally fixated with knots  A 7 mm BioComposite interference screw was then placed for further fixation  A free needle was then used to pass 1 suture strand through the distal end of the tendon and tied down for added fixation of the distal tendon to the radial tuberosity  The elbow was ranged and showed no signs of gapping of the tendon from the radial tuberosity and I was pleased with the fixation  The tourniquet was taken down and hemostasis was undertaken with bipolar cautery  The skin was closed with 2-0 and 3-0 Monocryl suture  Exofin was then placed  Mepilex dressing was then placed  The patient was placed in a regular sling  There is no complications throughout the case  The patient was placed in PACU in stable condition  I was present for the entire procedure , A qualified resident physician was not available  and A physician assistant was required during the procedure for retraction, tissue handling, dissection and suturing      Patient Disposition:  PACU         SIGNATURE: Jordana Tyrellmigueldavid  DATE: May 1, 2023  TIME: 4:32 PM

## 2023-05-01 NOTE — ANESTHESIA PROCEDURE NOTES
Peripheral Block    Patient location during procedure: pre-op  Start time: 5/1/2023 2:45 PM  Reason for block: at surgeon's request and post-op pain management  Staffing  Performed: Anesthesiologist   Anesthesiologist: Isac Moser MD  Preanesthetic Checklist  Completed: patient identified, IV checked, site marked, risks and benefits discussed, surgical consent, monitors and equipment checked, pre-op evaluation and timeout performed  Peripheral Block  Patient position: supine  Prep: ChloraPrep  Patient monitoring: heart rate, continuous pulse ox and frequent blood pressure checks  Block type: interscalene  Laterality: right  Injection technique: single-shot  Procedures: ultrasound guided, Ultrasound guidance required for the procedure to increase accuracy and safety of medication placement and decrease risk of complications    Ultrasound permanent image savedbupivacaine (MARCAINE) 0 5 % - Perineural   15 mL - 5/1/2023 2:45:00 PM (exparel 15 cc)  Needle  Needle type: Stimuplex   Needle gauge: 22 G  Needle length: 10 cm  Needle localization: ultrasound guidance  Assessment  Injection assessment: incremental injection, local visualized surrounding nerve on ultrasound, negative aspiration for heme and no paresthesia on injection  Paresthesia pain: none  Heart rate change: no  Slow fractionated injection: yes  Post-procedure:  site cleaned  patient tolerated the procedure well with no immediate complications

## 2023-05-01 NOTE — INTERVAL H&P NOTE
H&P reviewed  After examining the patient I find no changes in the patients condition since the H&P had been written  Vitals:    05/01/23 1430   BP: 130/85   Pulse: 72   Resp: 20   Temp: 97 5 °F (36 4 °C)   SpO2: 98%     Plan for right distal biceps repair today

## 2023-05-01 NOTE — ANESTHESIA POSTPROCEDURE EVALUATION
Post-Op Assessment Note    CV Status:  Stable  Pain Score: 0    Pain management: adequate     Mental Status:  Alert and awake   Hydration Status:  Euvolemic   PONV Controlled:  Controlled   Airway Patency:  Patent   Two or more mitigation strategies used for obstructive sleep apnea   Post Op Vitals Reviewed: Yes      Staff: CRNA, Anesthesiologist         No notable events documented      /72 (05/01/23 1650)    Temp (!) 97 4 °F (36 3 °C) (05/01/23 1650)    Pulse 73 (05/01/23 1650)   Resp 20 (05/01/23 1650)    SpO2 93 % (05/01/23 1650)

## 2023-05-02 ENCOUNTER — TELEPHONE (OUTPATIENT)
Dept: OBGYN CLINIC | Facility: HOSPITAL | Age: 53
End: 2023-05-02

## 2023-05-02 ENCOUNTER — TELEPHONE (OUTPATIENT)
Dept: OTHER | Facility: OTHER | Age: 53
End: 2023-05-02

## 2023-05-02 NOTE — TELEPHONE ENCOUNTER
Called and spoke w/pt and he had PASCUAL tendon rupture repair yesterday  He has fever 101 9-101  2  Just took ibuprofen  Taking AB  Drinking fluids water and apple juice  Will continue to monitor temp  No other complaints at this time  Please advise

## 2023-05-02 NOTE — TELEPHONE ENCOUNTER
Caller: Patient's girlfriend    Doctor: Rosalia Capps     Reason for call: Patient had surgery with Dr Rosalia Capps yesterday and he is running a 101 9 degree fever since 7 AM-started at 101 2  They would like to know if patient should take something or  If this is normal  Please advise       Call back#:[phone]

## 2023-05-02 NOTE — TELEPHONE ENCOUNTER
Called and spoke w/pt and informed of Dr Kyle Broussard  Reminded pt to use IS every hour and to take deep breaths  Monitor temp  Take Tylenol  Call if fevers continue

## 2023-05-03 ENCOUNTER — TELEPHONE (OUTPATIENT)
Dept: OBGYN CLINIC | Facility: HOSPITAL | Age: 53
End: 2023-05-03

## 2023-05-03 ENCOUNTER — HOSPITAL ENCOUNTER (OUTPATIENT)
Facility: HOSPITAL | Age: 53
Setting detail: OBSERVATION
Discharge: HOME | End: 2023-05-05
Attending: INTERNAL MEDICINE | Admitting: STUDENT IN AN ORGANIZED HEALTH CARE EDUCATION/TRAINING PROGRAM

## 2023-05-03 ENCOUNTER — APPOINTMENT (EMERGENCY)
Dept: RADIOLOGY | Facility: HOSPITAL | Age: 53
End: 2023-05-03

## 2023-05-03 ENCOUNTER — OFFICE VISIT (OUTPATIENT)
Dept: OBGYN CLINIC | Facility: CLINIC | Age: 53
End: 2023-05-03

## 2023-05-03 VITALS
SYSTOLIC BLOOD PRESSURE: 112 MMHG | HEART RATE: 111 BPM | BODY MASS INDEX: 35.65 KG/M2 | WEIGHT: 270.2 LBS | DIASTOLIC BLOOD PRESSURE: 74 MMHG | TEMPERATURE: 102.1 F

## 2023-05-03 DIAGNOSIS — D75.1 POLYCYTHEMIA: ICD-10-CM

## 2023-05-03 DIAGNOSIS — S46.211A RUPTURE OF RIGHT DISTAL BICEPS TENDON, INITIAL ENCOUNTER: Primary | ICD-10-CM

## 2023-05-03 DIAGNOSIS — S46.211A RUPTURE OF RIGHT DISTAL BICEPS TENDON, INITIAL ENCOUNTER: ICD-10-CM

## 2023-05-03 DIAGNOSIS — L27.0 DRUG RASH: Primary | ICD-10-CM

## 2023-05-03 LAB
ALBUMIN SERPL BCP-MCNC: 3.8 G/DL (ref 3.5–5)
ALP SERPL-CCNC: 51 U/L (ref 34–104)
ALT SERPL W P-5'-P-CCNC: 63 U/L (ref 7–52)
ANION GAP SERPL CALCULATED.3IONS-SCNC: 9 MMOL/L (ref 4–13)
AST SERPL W P-5'-P-CCNC: 66 U/L (ref 13–39)
BACTERIA UR QL AUTO: NORMAL /HPF
BASOPHILS # BLD AUTO: 0.02 THOUSANDS/ÂΜL (ref 0–0.1)
BASOPHILS NFR BLD AUTO: 0 % (ref 0–1)
BILIRUB SERPL-MCNC: 1.22 MG/DL (ref 0.2–1)
BILIRUB UR QL STRIP: NEGATIVE
BUN SERPL-MCNC: 17 MG/DL (ref 5–25)
CALCIUM SERPL-MCNC: 8.3 MG/DL (ref 8.4–10.2)
CHLORIDE SERPL-SCNC: 99 MMOL/L (ref 96–108)
CLARITY UR: CLEAR
CO2 SERPL-SCNC: 26 MMOL/L (ref 21–32)
COLOR UR: YELLOW
CREAT SERPL-MCNC: 1.53 MG/DL (ref 0.6–1.3)
CRP SERPL QL: 118.1 MG/L
EOSINOPHIL # BLD AUTO: 0.46 THOUSAND/ÂΜL (ref 0–0.61)
EOSINOPHIL NFR BLD AUTO: 3 % (ref 0–6)
ERYTHROCYTE [DISTWIDTH] IN BLOOD BY AUTOMATED COUNT: 12.8 % (ref 11.6–15.1)
ERYTHROCYTE [SEDIMENTATION RATE] IN BLOOD: 5 MM/HOUR (ref 0–20)
GFR SERPL CREATININE-BSD FRML MDRD: 52 ML/MIN/1.73SQ M
GLUCOSE SERPL-MCNC: 106 MG/DL (ref 65–140)
GLUCOSE UR STRIP-MCNC: NEGATIVE MG/DL
HCT VFR BLD AUTO: 52 % (ref 36.5–49.3)
HGB BLD-MCNC: 17.4 G/DL (ref 12–17)
HGB UR QL STRIP.AUTO: NEGATIVE
IMM GRANULOCYTES # BLD AUTO: 0.13 THOUSAND/UL (ref 0–0.2)
IMM GRANULOCYTES NFR BLD AUTO: 1 % (ref 0–2)
KETONES UR STRIP-MCNC: ABNORMAL MG/DL
LEUKOCYTE ESTERASE UR QL STRIP: NEGATIVE
LYMPHOCYTES # BLD AUTO: 0.32 THOUSANDS/ÂΜL (ref 0.6–4.47)
LYMPHOCYTES NFR BLD AUTO: 2 % (ref 14–44)
MCH RBC QN AUTO: 33 PG (ref 26.8–34.3)
MCHC RBC AUTO-ENTMCNC: 33.5 G/DL (ref 31.4–37.4)
MCV RBC AUTO: 99 FL (ref 82–98)
MONOCYTES # BLD AUTO: 0.4 THOUSAND/ÂΜL (ref 0.17–1.22)
MONOCYTES NFR BLD AUTO: 3 % (ref 4–12)
NEUTROPHILS # BLD AUTO: 14.71 THOUSANDS/ÂΜL (ref 1.85–7.62)
NEUTS SEG NFR BLD AUTO: 91 % (ref 43–75)
NITRITE UR QL STRIP: NEGATIVE
NON-SQ EPI CELLS URNS QL MICRO: NORMAL /HPF
NRBC BLD AUTO-RTO: 0 /100 WBCS
PH UR STRIP.AUTO: 7 [PH]
PLATELET # BLD AUTO: 204 THOUSANDS/UL (ref 149–390)
PMV BLD AUTO: 10.3 FL (ref 8.9–12.7)
POTASSIUM SERPL-SCNC: 3.9 MMOL/L (ref 3.5–5.3)
PROCALCITONIN SERPL-MCNC: 0.29 NG/ML
PROT SERPL-MCNC: 6.6 G/DL (ref 6.4–8.4)
PROT UR STRIP-MCNC: ABNORMAL MG/DL
RBC # BLD AUTO: 5.27 MILLION/UL (ref 3.88–5.62)
RBC #/AREA URNS AUTO: NORMAL /HPF
SODIUM SERPL-SCNC: 134 MMOL/L (ref 135–147)
SP GR UR STRIP.AUTO: 1.01 (ref 1–1.03)
UROBILINOGEN UR QL STRIP.AUTO: 4 E.U./DL
WBC # BLD AUTO: 16.04 THOUSAND/UL (ref 4.31–10.16)
WBC #/AREA URNS AUTO: NORMAL /HPF

## 2023-05-03 RX ORDER — OXYCODONE HYDROCHLORIDE 5 MG/1
5 TABLET ORAL EVERY 4 HOURS PRN
Status: DISCONTINUED | OUTPATIENT
Start: 2023-05-03 | End: 2023-05-05 | Stop reason: HOSPADM

## 2023-05-03 RX ORDER — HEPARIN SODIUM 5000 [USP'U]/ML
5000 INJECTION, SOLUTION INTRAVENOUS; SUBCUTANEOUS EVERY 8 HOURS SCHEDULED
Status: DISCONTINUED | OUTPATIENT
Start: 2023-05-03 | End: 2023-05-05 | Stop reason: HOSPADM

## 2023-05-03 RX ORDER — METHYLPREDNISOLONE SODIUM SUCCINATE 125 MG/2ML
125 INJECTION, POWDER, LYOPHILIZED, FOR SOLUTION INTRAMUSCULAR; INTRAVENOUS DAILY
Status: COMPLETED | OUTPATIENT
Start: 2023-05-04 | End: 2023-05-05

## 2023-05-03 RX ORDER — LIDOCAINE 50 MG/G
2 PATCH TOPICAL DAILY
Status: DISCONTINUED | OUTPATIENT
Start: 2023-05-04 | End: 2023-05-03

## 2023-05-03 RX ORDER — DIPHENHYDRAMINE HYDROCHLORIDE 50 MG/ML
25 INJECTION INTRAMUSCULAR; INTRAVENOUS ONCE
Status: COMPLETED | OUTPATIENT
Start: 2023-05-03 | End: 2023-05-03

## 2023-05-03 RX ORDER — DIPHENHYDRAMINE HYDROCHLORIDE 50 MG/ML
50 INJECTION INTRAMUSCULAR; INTRAVENOUS ONCE
Status: COMPLETED | OUTPATIENT
Start: 2023-05-03 | End: 2023-05-03

## 2023-05-03 RX ORDER — ACETAMINOPHEN 325 MG/1
650 TABLET ORAL EVERY 6 HOURS PRN
Status: DISCONTINUED | OUTPATIENT
Start: 2023-05-03 | End: 2023-05-03

## 2023-05-03 RX ORDER — FAMOTIDINE 10 MG/ML
20 INJECTION, SOLUTION INTRAVENOUS EVERY 12 HOURS SCHEDULED
Status: COMPLETED | OUTPATIENT
Start: 2023-05-03 | End: 2023-05-05

## 2023-05-03 RX ORDER — ESCITALOPRAM OXALATE 10 MG/1
10 TABLET ORAL DAILY
Status: DISCONTINUED | OUTPATIENT
Start: 2023-05-04 | End: 2023-05-05 | Stop reason: HOSPADM

## 2023-05-03 RX ORDER — TRAMADOL HYDROCHLORIDE 50 MG/1
50 TABLET ORAL EVERY 6 HOURS PRN
Status: DISCONTINUED | OUTPATIENT
Start: 2023-05-03 | End: 2023-05-05 | Stop reason: HOSPADM

## 2023-05-03 RX ORDER — PANTOPRAZOLE SODIUM 40 MG/1
40 TABLET, DELAYED RELEASE ORAL
Status: DISCONTINUED | OUTPATIENT
Start: 2023-05-04 | End: 2023-05-05 | Stop reason: HOSPADM

## 2023-05-03 RX ORDER — METHYLPREDNISOLONE SODIUM SUCCINATE 125 MG/2ML
125 INJECTION, POWDER, LYOPHILIZED, FOR SOLUTION INTRAMUSCULAR; INTRAVENOUS ONCE
Status: COMPLETED | OUTPATIENT
Start: 2023-05-03 | End: 2023-05-03

## 2023-05-03 RX ORDER — DIPHENHYDRAMINE HYDROCHLORIDE 50 MG/ML
25 INJECTION INTRAMUSCULAR; INTRAVENOUS EVERY 6 HOURS
Status: DISCONTINUED | OUTPATIENT
Start: 2023-05-03 | End: 2023-05-05 | Stop reason: HOSPADM

## 2023-05-03 RX ORDER — LIDOCAINE 50 MG/G
2 PATCH TOPICAL DAILY
Status: DISCONTINUED | OUTPATIENT
Start: 2023-05-03 | End: 2023-05-05 | Stop reason: HOSPADM

## 2023-05-03 RX ORDER — SODIUM CHLORIDE 9 MG/ML
125 INJECTION, SOLUTION INTRAVENOUS CONTINUOUS
Status: DISCONTINUED | OUTPATIENT
Start: 2023-05-03 | End: 2023-05-05 | Stop reason: HOSPADM

## 2023-05-03 RX ORDER — ONDANSETRON 2 MG/ML
4 INJECTION INTRAMUSCULAR; INTRAVENOUS EVERY 6 HOURS PRN
Status: DISCONTINUED | OUTPATIENT
Start: 2023-05-03 | End: 2023-05-05 | Stop reason: HOSPADM

## 2023-05-03 RX ORDER — FAMOTIDINE 10 MG/ML
20 INJECTION, SOLUTION INTRAVENOUS ONCE
Status: COMPLETED | OUTPATIENT
Start: 2023-05-03 | End: 2023-05-03

## 2023-05-03 RX ORDER — ACETAMINOPHEN 500 MG
1000 TABLET ORAL EVERY 6 HOURS PRN
COMMUNITY

## 2023-05-03 RX ORDER — INDOMETHACIN 75 MG/1
75 CAPSULE, EXTENDED RELEASE ORAL
Qty: 14 CAPSULE | Refills: 0 | Status: SHIPPED | OUTPATIENT
Start: 2023-05-03 | End: 2023-05-17

## 2023-05-03 RX ADMIN — FAMOTIDINE 20 MG: 10 INJECTION, SOLUTION INTRAVENOUS at 21:16

## 2023-05-03 RX ADMIN — DIPHENHYDRAMINE HYDROCHLORIDE 25 MG: 50 INJECTION, SOLUTION INTRAMUSCULAR; INTRAVENOUS at 15:40

## 2023-05-03 RX ADMIN — FAMOTIDINE 20 MG: 10 INJECTION, SOLUTION INTRAVENOUS at 15:40

## 2023-05-03 RX ADMIN — SODIUM CHLORIDE 125 ML/HR: 0.9 INJECTION, SOLUTION INTRAVENOUS at 18:13

## 2023-05-03 RX ADMIN — DIPHENHYDRAMINE HYDROCHLORIDE 25 MG: 50 INJECTION, SOLUTION INTRAMUSCULAR; INTRAVENOUS at 20:03

## 2023-05-03 RX ADMIN — LIDOCAINE 5% 2 PATCH: 700 PATCH TOPICAL at 22:33

## 2023-05-03 RX ADMIN — TRAMADOL HYDROCHLORIDE 50 MG: 50 TABLET, COATED ORAL at 18:12

## 2023-05-03 RX ADMIN — METHYLPREDNISOLONE SODIUM SUCCINATE 125 MG: 125 INJECTION, POWDER, FOR SOLUTION INTRAMUSCULAR; INTRAVENOUS at 15:39

## 2023-05-03 RX ADMIN — DIPHENHYDRAMINE HYDROCHLORIDE 50 MG: 50 INJECTION, SOLUTION INTRAMUSCULAR; INTRAVENOUS at 18:08

## 2023-05-03 RX ADMIN — HEPARIN SODIUM 5000 UNITS: 5000 INJECTION INTRAVENOUS; SUBCUTANEOUS at 18:08

## 2023-05-03 NOTE — TELEPHONE ENCOUNTER
He should take tylenol for the fever and encourage deep breath to fill his lungs with air every hour or incentive spirometry if they have it  We will see him today

## 2023-05-03 NOTE — TELEPHONE ENCOUNTER
Called and spoke w/Yadi,pt's significant other, and informed of LISA Wadsworth's msg  Pt took Tylenol this AM and fever is down to 102  He is drinking fluids and gatorade  Encouraged deep breathing and IS  Reminded of appt today at 1:45

## 2023-05-03 NOTE — ED PROVIDER NOTES
History  Chief Complaint   Patient presents with    Rash     Pt reports starting with generalized rash on Monday after right bicep tendon repair and starting antibiotic  Fever since yest morning  Temp 104 7 highest      This is a 50years old came for having extensive rash  Patient is stated that he woke up this morning and having fever 104F and extensive rash all over his body  Patient denies any itching  Patient has recent right biceps tendon repair which was done 2 days ago and patient was discharged on oxycodone and clindamycin as he is allergic to penicillin  Patient to the clindamycin yesterday and 1 dose this morning and then he found he has extensive rash  Patient denies any swelling of the tongue or lips  Patient has erythema from the head all the way to the foot  Patient home medication which include Tylenol, oxycodone, Cialis as needed, Lexapro, and clindamycin  The only medication he started recently is the clindamycin he took it yesterday and he took 1 dose this morning  Prior to Admission Medications   Prescriptions Last Dose Informant Patient Reported? Taking?   acetaminophen (TYLENOL) 500 mg tablet   Yes No   Sig: Take 1,000 mg by mouth every 6 (six) hours as needed for mild pain   escitalopram (LEXAPRO) 10 mg tablet   No No   Sig: TAKE 1 TABLET BY MOUTH EVERY DAY   indomethacin (INDOCIN SR) 75 mg CR capsule   No No   Sig: Take 1 capsule (75 mg total) by mouth daily with breakfast for 14 days   omeprazole (PriLOSEC) 40 MG capsule   No No   Sig: TAKE 1 CAPSULE BY MOUTH EVERY DAY   oxyCODONE (Roxicodone) 5 immediate release tablet   No No   Sig: Take 1 tablet (5 mg total) by mouth every 6 (six) hours as needed for moderate pain Max Daily Amount: 20 mg   tadalafil (CIALIS) 5 MG tablet   No No   Sig: TAKE 1 TABLET (5 MG TOTAL) BY MOUTH DAILY        Facility-Administered Medications: None       Past Medical History:   Diagnosis Date    Anxiety     GERD (gastroesophageal reflux disease)  OCD (obsessive compulsive disorder)     Sleep apnea     USES CPAP HS    Umbilical hernia     last assessed 08/30/17       Past Surgical History:   Procedure Laterality Date    ARTHROSCOPY KNEE Left     BICEPS TENDON REPAIR Right 5/1/2023    Procedure: REPAIR TENDON BICEPS- right and all necessary procedures;  Surgeon: Kyle Mejia;  Location:  MAIN OR;  Service: Orthopedics    HERNIA REPAIR  2017    KNEE SURGERY  [id number]    IL RPR UMBILICAL HRNA 5 YRS/> REDUCIBLE N/A 10/27/2017    Procedure: REPAIR HERNIA UMBILICAL with mesh;  Surgeon: Jostin Combs MD;  Location:  MAIN OR;  Service: General       Family History   Problem Relation Age of Onset    No Known Problems Mother     Other Father         adopted by father    Diabetes Son     Diabetes Son      I have reviewed and agree with the history as documented  E-Cigarette/Vaping    E-Cigarette Use Never User      E-Cigarette/Vaping Substances    Nicotine No     THC No     CBD No     Flavoring No     Other No     Unknown No      Social History     Tobacco Use    Smoking status: Never    Smokeless tobacco: Former     Types: Snuff     Quit date: 8/10/2020   Vaping Use    Vaping Use: Never used   Substance Use Topics    Alcohol use: Yes     Alcohol/week: 5 0 standard drinks     Types: 5 Cans of beer per week    Drug use: No       Review of Systems   Constitutional: Positive for fever  Negative for diaphoresis and fatigue  HENT: Negative for hearing loss  Respiratory: Negative for cough, chest tightness and shortness of breath  Cardiovascular: Negative for chest pain, palpitations and leg swelling  Gastrointestinal: Negative for abdominal pain, diarrhea, nausea and vomiting  Genitourinary: Negative for difficulty urinating, dysuria, flank pain and hematuria  Musculoskeletal: Negative for arthralgias, back pain, gait problem, neck pain and neck stiffness  Skin: Positive for rash  Negative for color change, pallor and wound  Neurological: Negative for dizziness, speech difficulty, weakness, light-headedness, numbness and headaches  Hematological: Negative for adenopathy  Does not bruise/bleed easily  Psychiatric/Behavioral: Negative for agitation and behavioral problems  Physical Exam  Physical Exam  Vitals and nursing note reviewed  Constitutional:       General: He is not in acute distress  Appearance: He is well-developed  He is not ill-appearing, toxic-appearing or diaphoretic  HENT:      Head: Normocephalic and atraumatic  Right Ear: Tympanic membrane and ear canal normal       Left Ear: Tympanic membrane and ear canal normal       Nose: Nose normal  No congestion or rhinorrhea  Mouth/Throat:      Mouth: Mucous membranes are moist       Pharynx: No oropharyngeal exudate or posterior oropharyngeal erythema  Comments: No lips or tongue swelling  No mucous membrane abnormalities  Eyes:      General: No scleral icterus  Right eye: No discharge  Left eye: No discharge  Extraocular Movements: Extraocular movements intact  Conjunctiva/sclera: Conjunctivae normal       Pupils: Pupils are equal, round, and reactive to light  Neck:      Vascular: No carotid bruit  Cardiovascular:      Rate and Rhythm: Normal rate and regular rhythm  Heart sounds: Normal heart sounds  No murmur heard  No friction rub  No gallop  Pulmonary:      Effort: Pulmonary effort is normal  No respiratory distress  Breath sounds: Normal breath sounds  No wheezing, rhonchi or rales  Chest:      Chest wall: No tenderness  Abdominal:      General: Bowel sounds are normal  There is no distension  Palpations: Abdomen is soft  There is no mass  Tenderness: There is no abdominal tenderness  There is no right CVA tenderness, left CVA tenderness, guarding or rebound  Hernia: No hernia is present     Musculoskeletal:         General: No swelling, tenderness, deformity or signs of injury  Normal range of motion  Cervical back: Normal range of motion and neck supple  No rigidity or tenderness  Right lower leg: No edema  Left lower leg: No edema  Lymphadenopathy:      Cervical: No cervical adenopathy  Skin:     General: Skin is warm and dry  Capillary Refill: Capillary refill takes less than 2 seconds  Coloration: Skin is not jaundiced or pale  Findings: Rash present  No bruising, erythema or lesion  Comments: Has extensive erythematous rash from the head all the way to the ankle and fiery looking skin on the face neck trunk lower extremities and upper extremities  Neurological:      General: No focal deficit present  Mental Status: He is alert and oriented to person, place, and time  Cranial Nerves: No cranial nerve deficit  Sensory: No sensory deficit  Motor: No weakness        Coordination: Coordination normal       Gait: Gait normal       Deep Tendon Reflexes: Reflexes normal    Psychiatric:         Behavior: Behavior normal          Vital Signs  ED Triage Vitals [05/03/23 1447]   Temperature Pulse Respirations Blood Pressure SpO2   99 4 °F (37 4 °C) 102 18 116/67 100 %      Temp src Heart Rate Source Patient Position - Orthostatic VS BP Location FiO2 (%)   -- Monitor -- -- --      Pain Score       7           Vitals:    05/03/23 1447   BP: 116/67   Pulse: 102         Visual Acuity      ED Medications  Medications   sodium chloride 0 9 % infusion (has no administration in time range)   diphenhydrAMINE (BENADRYL) injection 50 mg (has no administration in time range)   diphenhydrAMINE (BENADRYL) injection 25 mg (25 mg Intravenous Given 5/3/23 1540)   methylPREDNISolone sodium succinate (Solu-MEDROL) injection 125 mg (125 mg Intravenous Given 5/3/23 1539)   Famotidine (PF) (PEPCID) injection 20 mg (20 mg Intravenous Given 5/3/23 1540)       Diagnostic Studies  Results Reviewed     Procedure Component Value Units Date/Time Sedimentation rate, automated [[id number]] Collected: 05/03/23 1538    Lab Status: In process Specimen: Blood from Arm, Left Updated: 05/03/23 1705    C-reactive protein [[id number]] Collected: 05/03/23 1538    Lab Status: In process Specimen: Blood from Arm, Left Updated: 05/03/23 1704    CBC and differential [[id number]]  (Abnormal) Collected: 05/03/23 1538    Lab Status: Final result Specimen: Blood from Arm, Left Updated: 05/03/23 1641     WBC 16 04 Thousand/uL      RBC 5 27 Million/uL      Hemoglobin 17 4 g/dL      Hematocrit 52 0 %      MCV 99 fL      MCH 33 0 pg      MCHC 33 5 g/dL      RDW 12 8 %      MPV 10 3 fL      Platelets 456 Thousands/uL      nRBC 0 /100 WBCs      Neutrophils Relative 91 %      Immat GRANS % 1 %      Lymphocytes Relative 2 %      Monocytes Relative 3 %      Eosinophils Relative 3 %      Basophils Relative 0 %      Neutrophils Absolute 14 71 Thousands/µL      Immature Grans Absolute 0 13 Thousand/uL      Lymphocytes Absolute 0 32 Thousands/µL      Monocytes Absolute 0 40 Thousand/µL      Eosinophils Absolute 0 46 Thousand/µL      Basophils Absolute 0 02 Thousands/µL     Narrative: This is an appended report  These results have been appended to a previously verified report      Comprehensive metabolic panel [[id number]]  (Abnormal) Collected: 05/03/23 1538    Lab Status: Final result Specimen: Blood from Arm, Left Updated: 05/03/23 1609     Sodium 134 mmol/L      Potassium 3 9 mmol/L      Chloride 99 mmol/L      CO2 26 mmol/L      ANION GAP 9 mmol/L      BUN 17 mg/dL      Creatinine 1 53 mg/dL      Glucose 106 mg/dL      Calcium 8 3 mg/dL      AST 66 U/L      ALT 63 U/L      Alkaline Phosphatase 51 U/L      Total Protein 6 6 g/dL      Albumin 3 8 g/dL      Total Bilirubin 1 22 mg/dL      eGFR 52 ml/min/1 73sq m     Narrative:      [place] guidelines for Chronic Kidney Disease (CKD):     Stage 1 with normal or high GFR (GFR > 90 mL/min/1 73 square meters)   Stage 2 Mild CKD (GFR = 60-89 mL/min/1 73 square meters)    Stage 3A Moderate CKD (GFR = 45-59 mL/min/1 73 square meters)    Stage 3B Moderate CKD (GFR = 30-44 mL/min/1 73 square meters)    Stage 4 Severe CKD (GFR = 15-29 mL/min/1 73 square meters)    Stage 5 End Stage CKD (GFR <15 mL/min/1 73 square meters)  Note: GFR calculation is accurate only with a steady state creatinine    Blood culture #2 [[id number]] Collected: 05/03/23 1538    Lab Status: In process Specimen: Blood from Arm, Left Updated: 05/03/23 1548    Blood culture #1 [[id number]]     Lab Status: No result Specimen: Blood     UA w Reflex to Microscopic w Reflex to Culture [[id number]]     Lab Status: No result Specimen: Urine                  XR chest portable   ED Interpretation by Colton Bateman MD (05/03 1632)   CXR; NO acute infiltrate, cardiomegely                 Procedures  Procedures         ED Course                                             Medical Decision Making  This is 50years old was sent from orthopedic office as patient has fever 104F this morning and extensive erythematous rash going from the face all the way to the ankle  Patient started on clindamycin yesterday and he took another dose this morning and he developed this rash  Patient has allergy to penicillin  The patient has right biceps tendon repair 2 days ago  Patient has no respiratory distress no wheezing  Patient has no facial swelling of tongue or lips  Airway is patent  CXR shows no acute infiltrate  Patient has WBC is 16 K but this could be postop reaction  Creatinine increased from 1 1 to 1 5  Patient received Solu-Medrol and Benadryl IV at the ER he still has very extensive erythema and itching  This involved the whole body  We will admit the patient for observation  Amount and/or Complexity of Data Reviewed  Labs: ordered  Details: WBC 16K, Cr 1 53 , H/H 17/52  Radiology: ordered       Details: CXR; NO acute infiltrate, cardiomegely      Risk  Prescription drug management  Decision regarding hospitalization  Disposition  Final diagnoses:   Drug rash   Polycythemia     Time reflects when diagnosis was documented in both MDM as applicable and the Disposition within this note     Time User Action Codes Description Comment    5/3/2023  4:32 PM AdelaidasantosanushkaCali jiménez Add [L27 0] Drug rash     5/3/2023  4:32 PM Cali Bolton Add [D75 1] Polycythemia       ED Disposition     ED Disposition   Admit    Condition   Stable    Date/Time   Wed May 3, 2023  4:33 PM    Comment   Case was discussed with Fransico and the patient's admission status was agreed to be admitted for observation  to the service of Dr Layne Villegas  Follow-up Information    None         Current Discharge Medication List      CONTINUE these medications which have NOT CHANGED    Details   acetaminophen (TYLENOL) 500 mg tablet Take 1,000 mg by mouth every 6 (six) hours as needed for mild pain      escitalopram (LEXAPRO) 10 mg tablet TAKE 1 TABLET BY MOUTH EVERY DAY  Qty: 90 tablet, Refills: 0    Associated Diagnoses: Adjustment reaction with anxiety and depression      indomethacin (INDOCIN SR) 75 mg CR capsule Take 1 capsule (75 mg total) by mouth daily with breakfast for 14 days  Qty: 14 capsule, Refills: 0    Associated Diagnoses: Rupture of right distal biceps tendon, initial encounter      omeprazole (PriLOSEC) 40 MG capsule TAKE 1 CAPSULE BY MOUTH EVERY DAY  Qty: 90 capsule, Refills: 1    Comments: DX Code Needed      Associated Diagnoses: Gastroesophageal reflux disease, unspecified whether esophagitis present      oxyCODONE (Roxicodone) 5 immediate release tablet Take 1 tablet (5 mg total) by mouth every 6 (six) hours as needed for moderate pain Max Daily Amount: 20 mg  Qty: 30 tablet, Refills: 0    Comments: Continuing treatment  Associated Diagnoses: Rupture of right distal biceps tendon, initial encounter      tadalafil (CIALIS) 5 MG tablet TAKE 1 TABLET (5 MG TOTAL) BY MOUTH DAILY  Qty: 30 tablet, Refills: 0    Associated Diagnoses: Erectile dysfunction, unspecified erectile dysfunction type             No discharge procedures on file      PDMP Review       Value Time User    PDMP Reviewed  Yes 5/1/2023  3:16 PM Cherelle Max PA-C          ED Provider  Electronically Signed by           Lillian Acevedo MD  05/03/23 0914

## 2023-05-03 NOTE — ASSESSMENT & PLAN NOTE
· Patient is status post right bicep tendon repair with orthopedics on 5/1/2023  · Started taking clindamycin on 5/2 and developed whole body rash that worsened today  · Patient states that he did take his clindamycin dosages for today  · Denies any throat closure or itching or shortness of breath  · Suspect patient is having allergic reaction to clindamycin  · Patient received Solu-Medrol 125 mg, Pepcid, Benadryl in the ER  · Continue with Solu-Medrol 125 mg daily, standing benedryl 25 mg q6h, and standing pepcid 10 mg iv bid  · Consult infectious disease to help guide further antibiotic regiments for post op

## 2023-05-03 NOTE — PLAN OF CARE
Problem: PAIN - ADULT  Goal: Verbalizes/displays adequate comfort level or baseline comfort level  Description: Interventions:  - Encourage patient to monitor pain and request assistance  - Assess pain using appropriate pain scale  - Administer analgesics based on type and severity of pain and evaluate response  - Implement non-pharmacological measures as appropriate and evaluate response  - Consider cultural and social influences on pain and pain management  - Notify physician/advanced practitioner if interventions unsuccessful or patient reports new pain  Outcome: Progressing     Problem: INFECTION - ADULT  Goal: Absence or prevention of progression during hospitalization  Description: INTERVENTIONS:  - Assess and monitor for signs and symptoms of infection  - Monitor lab/diagnostic results  - Administer medications as ordered  - Instruct and encourage patient and family to use good hand hygiene technique  Outcome: Progressing  Goal: Absence of fever/infection during neutropenic period  Description: INTERVENTIONS:  - Monitor WBC    Outcome: Progressing     Problem: SAFETY ADULT  Goal: Patient will remain free of falls  Description: INTERVENTIONS:  - Educate patient/family on patient safety including physical limitations  - Instruct patient to call for assistance with activity   - Consult OT/PT to assist with strengthening/mobility   - Keep Call bell within reach  - Keep bed low and locked with side rails adjusted as appropriate  - Keep care items and personal belongings within reach  - Initiate and maintain comfort rounds  - Offer Toileting every 2  Hours, in advance of need  Outcome: Progressing  Goal: Maintain or return to baseline ADL function  Description: INTERVENTIONS:  -  Assess patient's ability to carry out ADLs; assess patient's baseline for ADL function and identify physical deficits which impact ability to perform ADLs (bathing, care of mouth/teeth, toileting, grooming, dressing, etc )  - Assess/evaluate cause of self-care deficits   - Assess range of motion  - Assess patient's mobility; develop plan if impaired  - Assess patient's need for assistive devices and provide as appropriate  - Encourage maximum independence but intervene and supervise when necessary  - Involve family in performance of ADLs  - Assess for home care needs following discharge   - Consider OT consult to assist with ADL evaluation and planning for discharge  - Provide patient education as appropriate  Outcome: Progressing  Goal: Maintains/Returns to pre admission functional level  Description: INTERVENTIONS:  - Perform BMAT or MOVE assessment daily    - Set and communicate daily mobility goal to care team and patient/family/caregiver  - Collaborate with rehabilitation services on mobility goals if consulted  - Perform Range of Motion 3 times a day    - Dangle patient  3 times a day  - Stand patient  3  times a day  - Ambulate patient  3 times a day  - Out of bed to chair  3  times a day   - Out of bed for meals * 3 times a day  - Out of bed for toileting  - Record patient progress and toleration of activity level   Outcome: Progressing     Problem: DISCHARGE PLANNING  Goal: Discharge to home or other facility with appropriate resources  Description: INTERVENTIONS:  - Identify barriers to discharge w/patient and caregiver  - Arrange for needed discharge resources and transportation as appropriate  - Identify discharge learning needs (meds, wound care, etc )  - Arrange for interpretive services to assist at discharge as needed  - Refer to Case Management Department for coordinating discharge planning if the patient needs post-hospital services based on physician/advanced practitioner order or complex needs related to functional status, cognitive ability, or social support system  Outcome: Progressing     Problem: Knowledge Deficit  Goal: Patient/family/caregiver demonstrates understanding of disease process, treatment plan, medications, and discharge instructions  Description: Complete learning assessment and assess knowledge base    Interventions:  - Provide teaching at level of understanding  - Provide teaching via preferred learning methods  Outcome: Progressing     Problem: MUSCULOSKELETAL - ADULT  Goal: Maintain or return mobility to safest level of function  Description: INTERVENTIONS:  - Assess patient's ability to carry out ADLs; assess patient's baseline for ADL function and identify physical deficits which impact ability to perform ADLs (bathing, care of mouth/teeth, toileting, grooming, dressing, etc )  - Assess/evaluate cause of self-care deficits   - Assess range of motion  - Assess patient's mobility  - Assess patient's need for assistive devices and provide as appropriate  - Encourage maximum independence but intervene and supervise when necessary  - Involve family in performance of ADLs  - Assess for home care needs following discharge   - Consider OT consult to assist with ADL evaluation and planning for discharge  - Provide patient education as appropriate  Outcome: Progressing  Goal: Maintain proper alignment of affected body part  Description: INTERVENTIONS:  - Support, maintain and protect limb and body alignment  - Provide patient/ family with appropriate education  Outcome: Progressing

## 2023-05-03 NOTE — TELEPHONE ENCOUNTER
"Received call from PT wondering what he is supposed to do  Informed pt that he is to go to ED to be checked out and admitted to ED to have CXR and checked for DVT as per LISA Wadsworth's msg  Pt once again asked, so I am to go to the Emergency Room  I replied, \"yes\"  He said, \"Ok  \" and hung up     "

## 2023-05-03 NOTE — PROGRESS NOTES
[address] [facility] [address] [address]  [phone]       Elaine Carter  [id number]  1970    ORTHOPAEDIC SURGERY OUTPATIENT NOTE  5/3/2023      HISTORY:  50 y o  male  Presents for post op follow up for right distal biceps  He comes in early due to fever and rash  Tmax at home 104  He is taking clindamycin  History of penicillin allergy in the past that he reports was a similar reaction  He says his right arm feels fine  He is maintaining the splint and sling  Past Medical History:   Diagnosis Date    Anxiety     GERD (gastroesophageal reflux disease)     OCD (obsessive compulsive disorder)     Sleep apnea     USES CPAP HS    Umbilical hernia     last assessed 08/30/17       Past Surgical History:   Procedure Laterality Date    ARTHROSCOPY KNEE Left     BICEPS TENDON REPAIR Right 5/1/2023    Procedure: REPAIR TENDON BICEPS- right and all necessary procedures;  Surgeon: Tiffanie Hernandez;  Location:  MAIN OR;  Service: Orthopedics    HERNIA REPAIR  2017    KNEE SURGERY  2938    ME RPR UMBILICAL HRNA 5 YRS/> REDUCIBLE N/A 10/27/2017    Procedure: REPAIR HERNIA UMBILICAL with mesh;  Surgeon: Wayne Morgan MD;  Location:  MAIN OR;  Service: General       Social History     Socioeconomic History    Marital status:      Spouse name: Not on file    Number of children: Not on file    Years of education: Not on file    Highest education level: Not on file   Occupational History    Not on file   Tobacco Use    Smoking status: Never    Smokeless tobacco: Former     Types: Snuff     Quit date: 8/10/2020   Vaping Use    Vaping Use: Never used   Substance and Sexual Activity    Alcohol use:  Yes     Alcohol/week: 5 0 standard drinks     Types: 5 Cans of beer per week    Drug use: No    Sexual activity: Yes     Partners: Female   Other Topics Concern    Not on file   Social History Narrative Caffeine use     Social Determinants of Health     Financial Resource Strain: Not on file   Food Insecurity: Not on file   Transportation Needs: Not on file   Physical Activity: Not on file   Stress: Not on file   Social Connections: Not on file   Intimate Partner Violence: Not on file   Housing Stability: Not on file       Family History   Problem Relation Age of Onset    No Known Problems Mother     Other Father         adopted by father    Diabetes Son     Diabetes Son         Patient's Medications   New Prescriptions    INDOMETHACIN (INDOCIN SR) 75 MG CR CAPSULE    Take 1 capsule (75 mg total) by mouth daily with breakfast for 14 days   Previous Medications    ACETAMINOPHEN (TYLENOL) 500 MG TABLET    Take 1,000 mg by mouth every 6 (six) hours as needed for mild pain    ESCITALOPRAM (LEXAPRO) 10 MG TABLET    TAKE 1 TABLET BY MOUTH EVERY DAY    OMEPRAZOLE (PRILOSEC) 40 MG CAPSULE    TAKE 1 CAPSULE BY MOUTH EVERY DAY    OXYCODONE (ROXICODONE) 5 IMMEDIATE RELEASE TABLET    Take 1 tablet (5 mg total) by mouth every 6 (six) hours as needed for moderate pain Max Daily Amount: 20 mg    TADALAFIL (CIALIS) 5 MG TABLET    TAKE 1 TABLET (5 MG TOTAL) BY MOUTH DAILY  Modified Medications    No medications on file   Discontinued Medications    CLINDAMYCIN (CLEOCIN) 300 MG CAPSULE    Take 1 capsule (300 mg total) by mouth 4 (four) times a day for 2 days       Allergies   Allergen Reactions    Penicillin V Hives        /74   Pulse (!) 111   Temp (!) 102 1 °F (38 9 °C)   Wt 123 kg (270 lb 3 2 oz)   BMI 35 65 kg/m²      REVIEW OF SYSTEMS:  Constitutional: Negative  HEENT: Negative  Respiratory: Negative  Skin: Negative  Neurological: Negative  Psychiatric/Behavioral: Negative  Musculoskeletal: Negative except for that mentioned in the HPI      /74   Pulse (!) 111   Temp (!) 102 1 °F (38 9 °C)   Wt 123 kg (270 lb 3 2 oz)   BMI 35 65 kg/m²   Gen: Uncomfortable appearing with whole body rash  HEENT: Eyes clear, moist mucus membranes, hearing intact  Respiratory: No audible wheezing or stridor  Cardiovascular: Well Perfused peripherally, 2+ distal pulse  Abdomen: nondistended, no peritoneal signs     PHYSICAL EXAM:    Patient appears uncomfortable  He has an erythematous rash over his entire body  Right upper extremity dressing is clean dry and intact  Sensation intact to the right hand    IMAGING: No new imaging    ASSESSMENT AND PLAN:  50 y o  male status post right distal biceps repair on 5/1/2023  He now presents with fever and whole body rash  Concern for drug reaction possibly from clindamycin given he has a previous penicillin reaction  Also concern for atelectasis versus DVT for cause of his fever  Recommend emergency room evaluation  I did call and speak with the emergency room as well      Scribe Attestation      I,:  Cortez Zeng PA-C am acting as a scribe while in the presence of the attending physician :       I,:  Paulette Dodd personally performed the services described in this documentation    as scribed in my presence :

## 2023-05-03 NOTE — TELEPHONE ENCOUNTER
Caller: Significant Other    Doctor: Dr Andie Bobby    Reason for call: Significant Other calling in regard to fever  She is reporting that this morning his temp is 104 7  She is wondering if there is anything she should be doing in regard to the fever of 104 7    Patient is scheduled with Dr Andie Bobby for today at 1:45PM   She is asking to speak to clinical team     Call back#: [phone]

## 2023-05-03 NOTE — ASSESSMENT & PLAN NOTE
Status post right bicep tendon repair on 5/1/2023  Ortho consulted for evaluation  PRN Oxycodone IR and morphine for pain  Hold tylenol due to rise in LFT's from tylenol use for post op pain

## 2023-05-03 NOTE — TELEPHONE ENCOUNTER
Caller: Patient     Doctor: Mark Vance    Reason for call: Patient went to one our offices and I got the nurse on the line to explain why he needed to go to the ER

## 2023-05-03 NOTE — TELEPHONE ENCOUNTER
The patient had surgery yesterday by the doctor (Camilla Henderson) The patient's girlfriend called to report that he still has a fever of 102 9       Paged the on call provider Via [facility]

## 2023-05-03 NOTE — H&P
[address]  H&P  Name: Uriah Nascimento 50 y o  male I MRN: [id number]  Unit/Bed#: -01 I Date of Admission: 5/3/2023   Date of Service: 5/3/2023 I Hospital Day: 0      Assessment/Plan   * Allergic drug rash  Assessment & Plan  · Patient is status post right bicep tendon repair with orthopedics on 5/1/2023  · Started taking clindamycin on 5/2 and developed whole body rash that worsened today  · Patient states that he did take his clindamycin dosages for today  · Denies any throat closure or itching or shortness of breath  · Suspect patient is having allergic reaction to clindamycin  · Patient received Solu-Medrol 125 mg, Pepcid, Benadryl in the ER  · Continue with Solu-Medrol 125 mg daily, standing benedryl 25 mg q6h, and standing pepcid 10 mg iv bid  · Consult infectious disease to help guide further antibiotic regiments for post op     Rupture of right distal biceps tendon  Assessment & Plan  Status post right bicep tendon repair on 5/1/2023  Ortho consulted for evaluation  PRN Oxycodone IR and morphine for pain  Hold tylenol due to rise in LFT's from tylenol use for post op pain    Adjustment reaction with anxiety and depression  Assessment & Plan  Resume home Lexapro    Esophageal reflux  Assessment & Plan  Continue Protonix       VTE Pharmacologic Prophylaxis: VTE Score: 3 Moderate Risk (Score 3-4) - Pharmacological DVT Prophylaxis Ordered: heparin  Code Status: Level 1 - Full Code   Discussion with family: Updated  (wife) at bedside  Anticipated Length of Stay: Patient will be admitted on an observation basis with an anticipated length of stay of less than 2 midnights secondary to drug rash      Total Time Spent on Date of Encounter in care of patient: 65 minutes This time was spent on one or more of the following: performing physical exam; counseling and coordination of care; obtaining or reviewing history; documenting in the medical record; reviewing/ordering tests, medications or procedures; communicating with other healthcare professionals and discussing with patient's family/caregivers  Chief Complaint: Rash    History of Present Illness:  Anupama Bucio is a 50 y o  male with a PMH of GERD, anxiety/depression, postop day 2 status post right tendon rupture repair who presents with whole body rash  Patient states that he woke up this morning having 100 for fever and extensive rash all over his body  Patient states that the rash started yesterday after he started taking clindamycin for postop surgical prophylaxis  Patient states that soon after starting the clindamycin he started developing a whole body rash  Denies having any respiratory symptoms  Patient states that he continue to take the clindamycin including the 2 doses for today  This morning patient was seen by orthopedic surgery who recommended that the patient come into the ER for evaluation  In the ER patient was given IV Solu-Medrol 125 mg, Benadryl 50 mg and Pepcid 10 mg  Patient being admitted for overnight monitoring  Review of Systems:  Review of Systems   Skin: Positive for rash  All other systems reviewed and are negative        Past Medical and Surgical History:   Past Medical History:   Diagnosis Date    Anxiety     GERD (gastroesophageal reflux disease)     OCD (obsessive compulsive disorder)     Sleep apnea     USES CPAP HS    Umbilical hernia     last assessed 08/30/17       Past Surgical History:   Procedure Laterality Date    ARTHROSCOPY KNEE Left     BICEPS TENDON REPAIR Right 5/1/2023    Procedure: REPAIR TENDON BICEPS- right and all necessary procedures;  Surgeon: Lawson Almendarez;  Location:  MAIN OR;  Service: Orthopedics    HERNIA REPAIR  2017    KNEE SURGERY  6439    [place] RPR UMBILICAL HRNA 5 YRS/> REDUCIBLE N/A 10/27/2017    Procedure: REPAIR HERNIA UMBILICAL with mesh;  Surgeon: Kristen Collado MD;  Location:  MAIN OR;  Service: General       Meds/Allergies:  Prior to Admission medications    Medication Sig Start Date End Date Taking? Authorizing Provider   acetaminophen (TYLENOL) 500 mg tablet Take 1,000 mg by mouth every 6 (six) hours as needed for mild pain    Historical Provider, MD   escitalopram (LEXAPRO) 10 mg tablet TAKE 1 TABLET BY MOUTH EVERY DAY 4/24/23   Ponce Clemente MD   indomethacin (INDOCIN SR) 75 mg CR capsule Take 1 capsule (75 mg total) by mouth daily with breakfast for 14 days 5/3/23 5/17/23  Amairani Zhang PA-C   omeprazole (PriLOSEC) 40 MG capsule TAKE 1 CAPSULE BY MOUTH EVERY DAY 2/27/23   Ponce Clemente MD   oxyCODONE (Roxicodone) 5 immediate release tablet Take 1 tablet (5 mg total) by mouth every 6 (six) hours as needed for moderate pain Max Daily Amount: 20 mg 5/1/23   Amairani Zhang PA-C   tadalafil (CIALIS) 5 MG tablet TAKE 1 TABLET (5 MG TOTAL) BY MOUTH DAILY  4/3/23   Ponce Clemente MD   clindamycin (CLEOCIN) 300 MG capsule Take 1 capsule (300 mg total) by mouth 4 (four) times a day for 2 days 5/1/23 5/3/23  Amairani Zhang PA-C     I have reviewed home medications with patient personally  Allergies:    Allergies   Allergen Reactions    Penicillin V Hives    Clindamycin Rash     Whole body rash, no airway symptoms       Social History:  Marital Status:      Patient Pre-hospital Living Situation: Home  Patient Pre-hospital Level of Mobility: walks  Patient Pre-hospital Diet Restrictions: none  Substance Use History:   Social History     Substance and Sexual Activity   Alcohol Use Yes    Alcohol/week: 5 0 standard drinks    Types: 5 Cans of beer per week     Social History     Tobacco Use   Smoking Status Never   Smokeless Tobacco Former    Types: Snuff    Quit date: 8/10/2020     Social History     Substance and Sexual Activity   Drug Use No       Family History:  Family History   Problem Relation Age of Onset    No Known Problems Mother     Other Father         adopted by father    Diabetes Son     Diabetes Son        Physical Exam: "    Vitals:   Blood Pressure: 119/73 (05/03/23 1715)  Pulse: 101 (05/03/23 1715)  Temperature: (!) 101 4 °F (38 6 °C) (05/03/23 1715)  Temp Source: Tympanic (05/03/23 1715)  Respirations: 16 (05/03/23 1715)  Height: 6' 1\" (185 4 cm) (05/03/23 1715)  Weight - Scale: 122 kg (268 lb 0 2 oz) (05/03/23 1715)  SpO2: 95 % (05/03/23 1715)    Physical Exam  Vitals and nursing note reviewed  Constitutional:       General: He is not in acute distress  Appearance: He is well-developed  HENT:      Head: Normocephalic and atraumatic  Eyes:      Conjunctiva/sclera: Conjunctivae normal    Cardiovascular:      Rate and Rhythm: Normal rate and regular rhythm  Heart sounds: No murmur heard  Pulmonary:      Effort: Pulmonary effort is normal  No respiratory distress  Breath sounds: Normal breath sounds  Abdominal:      Palpations: Abdomen is soft  Tenderness: There is no abdominal tenderness  Musculoskeletal:      Cervical back: Neck supple  Comments: Right upper extremity in sling   Skin:     Capillary Refill: Capillary refill takes less than 2 seconds  Comments: Rash throughout patient's whole body including arms legs and torso   Neurological:      Mental Status: He is alert  Mental status is at baseline     Psychiatric:         Mood and Affect: Mood normal           Additional Data:     Lab Results:  Results from last 7 days   Lab Units 05/03/23  1538   WBC Thousand/uL 16 04*   HEMOGLOBIN g/dL 17 4*   HEMATOCRIT % 52 0*   PLATELETS Thousands/uL 204   NEUTROS PCT % 91*   LYMPHS PCT % 2*   MONOS PCT % 3*   EOS PCT % 3     Results from last 7 days   Lab Units 05/03/23  1538   SODIUM mmol/L 134*   POTASSIUM mmol/L 3 9   CHLORIDE mmol/L 99   CO2 mmol/L 26   BUN mg/dL 17   CREATININE mg/dL 1 53*   ANION GAP mmol/L 9   CALCIUM mg/dL 8 3*   ALBUMIN g/dL 3 8   TOTAL BILIRUBIN mg/dL 1 22*   ALK PHOS U/L 51   ALT U/L 63*   AST U/L 66*   GLUCOSE RANDOM mg/dL 106                   " Lines/Drains:  Invasive Devices     Peripheral Intravenous Line  Duration           Peripheral IV 05/03/23 Left Antecubital <1 day                    Imaging: Reviewed radiology reports from this admission including: chest xray  XR chest portable   ED Interpretation by Juan Stevens MD (05/03 1632)   CXR; NO acute infiltrate, cardiomegely          EKG and Other Studies Reviewed on Admission:   · EKG: No EKG obtained  ** Please Note: This note has been constructed using a voice recognition system   **

## 2023-05-04 PROBLEM — E83.39 HYPOPHOSPHATEMIA: Status: ACTIVE | Noted: 2023-05-04

## 2023-05-04 PROBLEM — R65.10 SIRS (SYSTEMIC INFLAMMATORY RESPONSE SYNDROME) (HCC): Status: ACTIVE | Noted: 2023-05-04

## 2023-05-04 LAB
ALBUMIN SERPL BCP-MCNC: 3.3 G/DL (ref 3.5–5)
ALP SERPL-CCNC: 46 U/L (ref 34–104)
ALT SERPL W P-5'-P-CCNC: 53 U/L (ref 7–52)
ANION GAP SERPL CALCULATED.3IONS-SCNC: 7 MMOL/L (ref 4–13)
AST SERPL W P-5'-P-CCNC: 46 U/L (ref 13–39)
BILIRUB SERPL-MCNC: 0.43 MG/DL (ref 0.2–1)
BUN SERPL-MCNC: 17 MG/DL (ref 5–25)
CALCIUM ALBUM COR SERPL-MCNC: 8.8 MG/DL (ref 8.3–10.1)
CALCIUM SERPL-MCNC: 8.2 MG/DL (ref 8.4–10.2)
CHLORIDE SERPL-SCNC: 103 MMOL/L (ref 96–108)
CO2 SERPL-SCNC: 25 MMOL/L (ref 21–32)
CREAT SERPL-MCNC: 1.23 MG/DL (ref 0.6–1.3)
CRP SERPL QL: 146.7 MG/L
ERYTHROCYTE [DISTWIDTH] IN BLOOD BY AUTOMATED COUNT: 12.5 % (ref 11.6–15.1)
GFR SERPL CREATININE-BSD FRML MDRD: 68 ML/MIN/1.73SQ M
GLUCOSE P FAST SERPL-MCNC: 141 MG/DL (ref 65–99)
GLUCOSE SERPL-MCNC: 141 MG/DL (ref 65–140)
HCT VFR BLD AUTO: 43.7 % (ref 36.5–49.3)
HGB BLD-MCNC: 14.4 G/DL (ref 12–17)
MAGNESIUM SERPL-MCNC: 2.1 MG/DL (ref 1.9–2.7)
MCH RBC QN AUTO: 32.4 PG (ref 26.8–34.3)
MCHC RBC AUTO-ENTMCNC: 33 G/DL (ref 31.4–37.4)
MCV RBC AUTO: 98 FL (ref 82–98)
PHOSPHATE SERPL-MCNC: 2 MG/DL (ref 2.7–4.5)
PLATELET # BLD AUTO: 201 THOUSANDS/UL (ref 149–390)
PMV BLD AUTO: 11.2 FL (ref 8.9–12.7)
POTASSIUM SERPL-SCNC: 4.1 MMOL/L (ref 3.5–5.3)
PROCALCITONIN SERPL-MCNC: 0.38 NG/ML
PROT SERPL-MCNC: 6.1 G/DL (ref 6.4–8.4)
RBC # BLD AUTO: 4.45 MILLION/UL (ref 3.88–5.62)
SODIUM SERPL-SCNC: 135 MMOL/L (ref 135–147)
WBC # BLD AUTO: 16.71 THOUSAND/UL (ref 4.31–10.16)

## 2023-05-04 RX ORDER — VANCOMYCIN HYDROCHLORIDE 1 G/200ML
1000 INJECTION, SOLUTION INTRAVENOUS EVERY 12 HOURS
Status: DISCONTINUED | OUTPATIENT
Start: 2023-05-04 | End: 2023-05-04

## 2023-05-04 RX ADMIN — DIPHENHYDRAMINE HYDROCHLORIDE 25 MG: 50 INJECTION, SOLUTION INTRAMUSCULAR; INTRAVENOUS at 13:49

## 2023-05-04 RX ADMIN — VANCOMYCIN HYDROCHLORIDE 2000 MG: 1 INJECTION, POWDER, LYOPHILIZED, FOR SOLUTION INTRAVENOUS at 08:25

## 2023-05-04 RX ADMIN — METHYLPREDNISOLONE SODIUM SUCCINATE 125 MG: 125 INJECTION, POWDER, FOR SOLUTION INTRAMUSCULAR; INTRAVENOUS at 08:15

## 2023-05-04 RX ADMIN — LIDOCAINE 5% 2 PATCH: 700 PATCH TOPICAL at 20:49

## 2023-05-04 RX ADMIN — DIPHENHYDRAMINE HYDROCHLORIDE 25 MG: 50 INJECTION, SOLUTION INTRAMUSCULAR; INTRAVENOUS at 01:57

## 2023-05-04 RX ADMIN — FAMOTIDINE 20 MG: 10 INJECTION, SOLUTION INTRAVENOUS at 08:18

## 2023-05-04 RX ADMIN — FAMOTIDINE 20 MG: 10 INJECTION, SOLUTION INTRAVENOUS at 20:37

## 2023-05-04 RX ADMIN — POTASSIUM & SODIUM PHOSPHATES POWDER PACK 280-160-250 MG 1 PACKET: 280-160-250 PACK at 16:53

## 2023-05-04 RX ADMIN — POTASSIUM & SODIUM PHOSPHATES POWDER PACK 280-160-250 MG 1 PACKET: 280-160-250 PACK at 08:28

## 2023-05-04 RX ADMIN — HEPARIN SODIUM 5000 UNITS: 5000 INJECTION INTRAVENOUS; SUBCUTANEOUS at 22:28

## 2023-05-04 RX ADMIN — DIPHENHYDRAMINE HYDROCHLORIDE 25 MG: 50 INJECTION, SOLUTION INTRAMUSCULAR; INTRAVENOUS at 20:38

## 2023-05-04 RX ADMIN — SODIUM CHLORIDE 125 ML/HR: 0.9 INJECTION, SOLUTION INTRAVENOUS at 05:20

## 2023-05-04 RX ADMIN — SODIUM CHLORIDE 125 ML/HR: 0.9 INJECTION, SOLUTION INTRAVENOUS at 16:38

## 2023-05-04 RX ADMIN — ESCITALOPRAM OXALATE 10 MG: 10 TABLET ORAL at 08:24

## 2023-05-04 RX ADMIN — HEPARIN SODIUM 5000 UNITS: 5000 INJECTION INTRAVENOUS; SUBCUTANEOUS at 05:19

## 2023-05-04 RX ADMIN — DIPHENHYDRAMINE HYDROCHLORIDE 25 MG: 50 INJECTION, SOLUTION INTRAMUSCULAR; INTRAVENOUS at 08:19

## 2023-05-04 RX ADMIN — PANTOPRAZOLE SODIUM 40 MG: 40 TABLET, DELAYED RELEASE ORAL at 05:19

## 2023-05-04 NOTE — OCCUPATIONAL THERAPY NOTE
Occupational Therapy Screen     Patient Name: Dusty CURTISIQYDUANE Date: 5/4/2023  Problem List  Principal Problem: Allergic drug rash  Active Problems:    Esophageal reflux    Adjustment reaction with anxiety and depression    Elevated LFTs    Rupture of right distal biceps tendon    Hypophosphatemia    [facility] ([facility]          05/04/23 1000   OT Last Visit   OT Visit Date 05/04/23   Note Type   Note type Screen   Additional Comments OT order received and chart review performed  Spoke with PT Katerin who reports pt has been functioning at an independent level within room and bathroom without assistive device  Pt is s/p biceps tendon repair sx on 5/1/23, but  able to complete ADLs independently within room at this time  Occupational performance is not significantly impacted by any physical or cognitive deficits; no OT services warranted at this time  Will sign off, D/C OT  Please reconsult if further immediate skilled OT needs warranted       Fermin Singh

## 2023-05-04 NOTE — PROGRESS NOTES
[address]  68   Progress Note  Name: Keven Edouard  MRN: [id number]  Unit/Bed#: -01 I Date of Admission: 5/3/2023   Date of Service: 5/4/2023 I Hospital Day: 0    Assessment/Plan   * Allergic drug rash  Assessment & Plan  Patient presents with whole body rash since taking clindamycin on 5/2  Denies SOB, throat close or itching  · Likely drug/allergic reaction from clindamycin  · CXR without acute findings, patient stable on room air  · C/w IV Solu-Medrol 125 mg daily x 3 doses, scheduled Benadryl 25 mg q6hrs & Pepcid 20mg BID  · Pending completion of IV meds and clinical improvement, hopefully can discharge tomorrow   · Supportive care    SIRS (systemic inflammatory response syndrome) (HCC)  Assessment & Plan  POA with leukocytosis, fever, mild tachycardia  Likely related to adverse drug reaction as above  · Procal: 0 29->0 38 / CRP: 118->146  · BC x2 in process  · ID consult:  · IV vancomycin for ppx, to complete today  No need for further abx  · Avoid further clindamycin  · Follow culture results    Rupture of right distal biceps tendon  Assessment & Plan  S/p right bicep tendon repair with orthopedics on 5/1/23, was discharged with clindamycin    · PRN Oxycodone IR and morphine for pain  · Orthopedic surgery consult:  · Typically only treat with 2 days postop prophylaxis, already completed  · NWB RUE with sling  · Hold Tylenol with elevated LFTs    Hypophosphatemia  Assessment & Plan  · Mildly decreased phosphorus, 2 0  · Replete phosphorus  · Monitor while inpatient    Elevated LFTs  Assessment & Plan  · Mildly elevated LFTs on arrival: AST 66 / ALT 63  · Likely in setting of adverse drug reaction as above  · LFTs trending down, benign abdominal exam  · Monitor CMP while inpatient    Adjustment reaction with anxiety and depression  Assessment & Plan  · Continue home Lexapro    Esophageal reflux  Assessment & Plan  · Continue PPI           VTE Pharmacologic Prophylaxis: VTE Score: 3 Moderate Risk (Score 3-4) - Pharmacological DVT Prophylaxis Ordered: heparin  Patient Centered Rounds: I performed bedside rounds with nursing staff today  Discussions with Specialists or Other Care Team Provider: ID, orthopedic surgery, case management    Education and Discussions with Family / Patient: Patient declined call to   Total Time Spent on Date of Encounter in care of patient: 35 minutes This time was spent on one or more of the following: performing physical exam; counseling and coordination of care; obtaining or reviewing history; documenting in the medical record; reviewing/ordering tests, medications or procedures; communicating with other healthcare professionals and discussing with patient's family/caregivers  Current Length of Stay: 0 day(s)  Current Patient Status: Observation   Certification Statement: The patient will continue to require additional inpatient hospital stay due to IV medications, clinical improvement  Discharge Plan: Anticipate discharge in 24-48 hrs to home  Code Status: Level 1 - Full Code    Subjective:   Patient is seen at bedside this a m , reports ongoing diffuse itchiness of skin, although does report feeling better overall  Patient still with diffuse rash across body and face, denies SOB, chest pain, itchiness/closing of throat  Objective:     Vitals:   Temp (24hrs), Av 2 °F (37 9 °C), Min:98 °F (36 7 °C), Max:102 1 °F (38 9 °C)    Temp:  [98 °F (36 7 °C)-102 1 °F (38 9 °C)] 98 °F (36 7 °C)  HR:  [] 83  Resp:  [16-20] 20  BP: (112-119)/(65-74) 112/67  SpO2:  [93 %-100 %] 93 %  Body mass index is 35 36 kg/m²  Input and Output Summary (last 24 hours): Intake/Output Summary (Last 24 hours) at 2023 1254  Last data filed at 2023 0731  Gross per 24 hour   Intake 360 ml   Output 900 ml   Net -540 ml       Physical Exam:   Physical Exam  Constitutional:       General: He is not in acute distress       Appearance: He is not ill-appearing, toxic-appearing or diaphoretic  Cardiovascular:      Rate and Rhythm: Normal rate and regular rhythm  Pulses: Normal pulses  Heart sounds: Normal heart sounds  Pulmonary:      Effort: Pulmonary effort is normal  No respiratory distress  Breath sounds: Normal breath sounds  No stridor  Abdominal:      General: Bowel sounds are normal  There is no distension  Palpations: Abdomen is soft  Tenderness: There is no abdominal tenderness  Musculoskeletal:         General: No swelling or tenderness  Skin:     General: Skin is warm  Findings: Erythema and rash present  Comments: Diffuse, erythematous rash across body, most prominently on torso/back but also on face and all extremities  Nontender to palpation, no open wounds or edema  Neurological:      General: No focal deficit present  Mental Status: He is alert and oriented to person, place, and time  Psychiatric:         Mood and Affect: Mood normal          Behavior: Behavior normal           Additional Data:     Labs:  Results from last 7 days   Lab Units 05/04/23  0509 05/03/23  1538   WBC Thousand/uL 16 71* 16 04*   HEMOGLOBIN g/dL 14 4 17 4*   HEMATOCRIT % 43 7 52 0*   PLATELETS Thousands/uL 201 204   NEUTROS PCT %  --  91*   LYMPHS PCT %  --  2*   MONOS PCT %  --  3*   EOS PCT %  --  3     Results from last 7 days   Lab Units 05/04/23  0509   SODIUM mmol/L 135   POTASSIUM mmol/L 4 1   CHLORIDE mmol/L 103   CO2 mmol/L 25   BUN mg/dL 17   CREATININE mg/dL 1 23   ANION GAP mmol/L 7   CALCIUM mg/dL 8 2*   ALBUMIN g/dL 3 3*   TOTAL BILIRUBIN mg/dL 0 43   ALK PHOS U/L 46   ALT U/L 53*   AST U/L 46*   GLUCOSE RANDOM mg/dL 141*                 Results from last 7 days   Lab Units 05/04/23  0509 05/03/23  1538   PROCALCITONIN ng/ml 0 38* 0 29*       Lines/Drains:  Invasive Devices     Peripheral Intravenous Line  Duration           Peripheral IV 05/04/23 Dorsal (posterior); Left Hand <1 day Imaging: Reviewed radiology reports from this admission including: chest xray    Recent Cultures (last 7 days):   Results from last 7 days   Lab Units 05/03/23  1816 05/03/23  1538   BLOOD CULTURE  Received in Microbiology Lab  Culture in Progress  Received in Microbiology Lab  Culture in Progress  Last 24 Hours Medication List:   Current Facility-Administered Medications   Medication Dose Route Frequency Provider Last Rate    diphenhydrAMINE  25 mg Intravenous Q6H Pandi Todhe, DO      escitalopram  10 mg Oral Daily Pandi Todhe, DO      famotidine  20 mg Intravenous Q12H [address] Pandi Todhe, DO      heparin (porcine)  5,000 Units Subcutaneous Q8H [address] Pandi Todhe, DO      lidocaine  2 patch Topical Daily Safia Barnes PA-C      methylPREDNISolone sodium succinate  125 mg Intravenous Daily Pandi Todhe, DO      morphine injection  2 mg Intravenous Q4H PRN Pandi Todhe, DO      ondansetron  4 mg Intravenous Q6H PRN Pandi Todhe, DO      oxyCODONE  5 mg Oral Q4H PRN Pandi Todhe, DO      pantoprazole  40 mg Oral Early Morning Pandi Todhe, DO      potassium-sodium phosphates  1 packet Oral BID With Meals Duyen Reid PA-C      sodium chloride  125 mL/hr Intravenous Continuous Juan Stevens  mL/hr (05/04/23 0520)    traMADol  50 mg Oral Q6H PRN Pandi Todhe, DO      vancomycin  1,000 mg Intravenous Q12H Duyen Reid PA-C          Today, Patient Was Seen By: Duyen Reid PA-C    **Please Note: This note may have been constructed using a voice recognition system  **

## 2023-05-04 NOTE — ASSESSMENT & PLAN NOTE
POA with leukocytosis, fever, mild tachycardia  Likely related to adverse drug reaction as above  · Procal: 0 29->0 38 / CRP: 118->146  · BC x2 in process  · ID consult:  · IV vancomycin for ppx, to complete today  No need for further abx    · Avoid further clindamycin  · Follow culture results

## 2023-05-04 NOTE — CONSULTS
Orthopedics   Emily Seen 50 y o  male MRN: [id number]  Unit/Bed#: -01      Chief Complaint:   Diffuse maculopapular rash    HPI:  50 y o  male postop day 3 status post right distal biceps repair with Dr King Palacio  Patient was placed on clindamycin for surgical prophylaxis  He has a history of penicillin allergy resulting in diffuse rash  Yesterday the patient had noticed that he had began to have diffuse redness and itchiness over his entire body with the development of pustules and a Tmax of 100 1 for  The patient presented to the clinic and was then sent to the emergency department for further evaluation  He last took clindamycin yesterday morning he and subsequently has been placed on IV vancomycin he notes mild improvement  He has had no numbness tingling or pain in his right upper extremity  He has used no new soaps or detergents other than the surgical scrub      Review Of Systems:   · Skin: Diffuse rash  · Neuro: See HPI  · Musculoskeletal: See HPI  · 14 point review of systems negative except as stated above     Past Medical History:   Past Medical History:   Diagnosis Date    Anxiety     GERD (gastroesophageal reflux disease)     OCD (obsessive compulsive disorder)     Sleep apnea     USES CPAP HS    Umbilical hernia     last assessed 08/30/17       Past Surgical History:   Past Surgical History:   Procedure Laterality Date    ARTHROSCOPY KNEE Left     BICEPS TENDON REPAIR Right 5/1/2023    Procedure: REPAIR TENDON BICEPS- right and all necessary procedures;  Surgeon: Yessenia Perry;  Location:  MAIN OR;  Service: Orthopedics    HERNIA REPAIR  2017    KNEE SURGERY  2137    IN RPR UMBILICAL HRNA 5 YRS/> REDUCIBLE N/A 10/27/2017    Procedure: REPAIR HERNIA UMBILICAL with mesh;  Surgeon: Jordan Agarwal MD;  Location:  MAIN OR;  Service: General       Family History:  Family history reviewed and non-contributory  Family History   Problem Relation Age of Onset    No Known Problems Mother     Other Father         adopted by father    Diabetes Son     Diabetes Son        Social History:  Social History     Socioeconomic History    Marital status:      Spouse name: None    Number of children: None    Years of education: None    Highest education level: None   Occupational History    None   Tobacco Use    Smoking status: Never    Smokeless tobacco: Former     Types: Snuff     Quit date: 8/10/2020   Vaping Use    Vaping Use: Never used   Substance and Sexual Activity    Alcohol use: Yes     Alcohol/week: 5 0 standard drinks     Types: 5 Cans of beer per week    Drug use: No    Sexual activity: Yes     Partners: Female   Other Topics Concern    None   Social History Narrative    Caffeine use     Social Determinants of Health     Financial Resource Strain: Not on file   Food Insecurity: Not on file   Transportation Needs: Not on file   Physical Activity: Not on file   Stress: Not on file   Social Connections: Not on file   Intimate Partner Violence: Not on file   Housing Stability: Not on file       Allergies:    Allergies   Allergen Reactions    Penicillin V Hives    Clindamycin Rash     Whole body rash, no airway symptoms           Labs:  0   Lab Value Date/Time    HCT 43 7 05/04/2023 0509    HCT 52 0 (H) 05/03/2023 1538    HCT 50 8 (H) 04/27/2023 1122    HGB 14 4 05/04/2023 0509    HGB 17 4 (H) 05/03/2023 1538    HGB 16 4 04/27/2023 1122    WBC 16 71 (H) 05/04/2023 0509    WBC 16 04 (H) 05/03/2023 1538    WBC 5 46 04/27/2023 1122    ESR 5 05/03/2023 1538     7 (H) 05/04/2023 0509       Meds:    Current Facility-Administered Medications:     diphenhydrAMINE (BENADRYL) injection 25 mg, 25 mg, Intravenous, Q6H, Jayant Todhe, DO, 25 mg at 05/04/23 0819    escitalopram (LEXAPRO) tablet 10 mg, 10 mg, Oral, Daily, Panddavid Toromane, DO, 10 mg at 05/04/23 0824    Famotidine (PF) (PEPCID) injection 20 mg, 20 mg, Intravenous, Q12H [address], Pandi Todhe, DO, 20 mg at 05/04/23 0818   "heparin (porcine) subcutaneous injection 5,000 Units, 5,000 Units, Subcutaneous, Q8H [address], Pandi Todhe, DO, 5,000 Units at 05/04/23 0519    lidocaine (LIDODERM) 5 % patch 2 patch, 2 patch, Topical, Daily, Safia Denson PA-C, 2 patch at 05/03/23 2233    methylPREDNISolone sodium succinate (Solu-MEDROL) injection 125 mg, 125 mg, Intravenous, Daily, Pandi Todhe, DO, 125 mg at 05/04/23 0815    morphine injection 2 mg, 2 mg, Intravenous, Q4H PRN, Pandi Todhe, DO    ondansetron (ZOFRAN) injection 4 mg, 4 mg, Intravenous, Q6H PRN, Pandi Todhe, DO    oxyCODONE (ROXICODONE) IR tablet 5 mg, 5 mg, Oral, Q4H PRN, Pandi Todhe, DO    pantoprazole (PROTONIX) EC tablet 40 mg, 40 mg, Oral, Early Morning, Pandi Todhe, DO, 40 mg at 05/04/23 0519    potassium-sodium phosphates (PHOS-NAK) packet 1 packet, 1 packet, Oral, BID With Meals, Nessa Brewer PA-C, 1 packet at 05/04/23 0828    sodium chloride 0 9 % infusion, 125 mL/hr, Intravenous, Continuous, Cali Sullivan MD, Last Rate: 125 mL/hr at 05/04/23 0520, 125 mL/hr at 05/04/23 0520    traMADol (ULTRAM) tablet 50 mg, 50 mg, Oral, Q6H PRN, Pandi Todhe, DO, 50 mg at 05/03/23 1812    vancomycin (VANCOCIN) 2,000 mg in sodium chloride 0 9 % 500 mL IVPB, 2,000 mg, Intravenous, Once, Pandi Todhe, DO, Last Rate: 250 mL/hr at 05/04/23 0825, 2,000 mg at 05/04/23 0825    vancomycin (VANCOCIN) IVPB (premix in dextrose) 1,000 mg 200 mL, 1,000 mg, Intravenous, Q12H, Pandi Todhe, DO    Blood Culture:   Lab Results   Component Value Date    BLOODCX Received in Microbiology Lab  Culture in Progress  05/03/2023       Wound Culture:   No results found for: WOUNDCULT    Ins and Outs:  I/O last 24 hours:   In: 360 [P O :360]  Out: 900 [Urine:900]          Physical Exam:   /67 (BP Location: Left arm)   Pulse 83   Temp 98 °F (36 7 °C) (Oral)   Resp 20   Ht 6' 1\" (1 854 m)   Wt 122 kg (268 lb 0 2 oz)   SpO2 93%   BMI 35 36 kg/m²   Gen: No acute distress, resting " comfortably in bed  HEENT: Eyes clear, moist mucus membranes, hearing intact  Respiratory: No audible wheezing or stridor  Cardiovascular: Well Perfused peripherally, 2+ distal pulse  Abdomen: nondistended, no peritoneal signs  Musculoskeletal: right upper extremity  · Skin diffuse maculopapular rash with pustules forming over the shoulder  · Mildly tender katerine incisionally  · Splint intact, mepilex without strikethrough or drainage   · SILT m/r/u    · Motor intact ain/pin/m/r/u  · < 2 sec cap refill      Tertiary: no tenderness over all other joints/long bones as except already stated  Radiology:   I personally reviewed the films  No new imaging    _*_*_*_*_*_*_*_*_*_*_*_*_*_*_*_*_*_*_*_*_*_*_*_*_*_*_*_*_*_*_*_*_*_*_*_*_*_*_*_*_*    Assessment:  52 y o male postop day 3 from right distal biceps repair now with drug rash  Agree with primary team's decision to discontinue clindamycin and change to vancomycin  With regard to surgical prophylaxis, the patient may be considered for Bactrim on discharge  No evidence of surgical site infection or concern for complication with respect to the surgery  Plan:   · NWB right upper extremity in sling with posterior slab splint  · Discontinue clindamycin, may consider bactrim for surgical prophylaxis course  · Follow-up as previously scheduled in the clinic  · Remainder of care per primary team  · Body mass index is 35 36 kg/m²  moderately obese  Recommend behavior modifications, nutrition and physical activity    · Dispo: Ortho will follow    Blair Pacheco MD

## 2023-05-04 NOTE — PLAN OF CARE
Problem: PAIN - ADULT  Goal: Verbalizes/displays adequate comfort level or baseline comfort level  Description: Interventions:  - Encourage patient to monitor pain and request assistance  - Assess pain using appropriate pain scale  - Administer analgesics based on type and severity of pain and evaluate response  - Implement non-pharmacological measures as appropriate and evaluate response  - Consider cultural and social influences on pain and pain management  - Notify physician/advanced practitioner if interventions unsuccessful or patient reports new pain  5/4/2023 0104 by Shaka Richardson RN  Outcome: Not Progressing  5/4/2023 0104 by Shaka Richardson RN  Outcome: Progressing     Problem: INFECTION - ADULT  Goal: Absence or prevention of progression during hospitalization  Description: INTERVENTIONS:  - Assess and monitor for signs and symptoms of infection  - Monitor lab/diagnostic results  - Monitor all insertion sites, i e  indwelling lines, tubes, and drains  - Monitor endotracheal if appropriate and nasal secretions for changes in amount and color  - [place] appropriate cooling/warming therapies per order  - Administer medications as ordered  - Instruct and encourage patient and family to use good hand hygiene technique  - Identify and instruct in appropriate isolation precautions for identified infection/condition  5/4/2023 0104 by Shaka Richardson RN  Outcome: Not Progressing  5/4/2023 0104 by Shaka Richardson RN  Outcome: Not Progressing  Goal: Absence of fever/infection during neutropenic period  Description: INTERVENTIONS:  - Monitor WBC    5/4/2023 0104 by Shaka Richardson RN  Outcome: Not Progressing  5/4/2023 0104 by Shaka Richardson RN  Outcome: Not Progressing     Problem: DISCHARGE PLANNING  Goal: Discharge to home or other facility with appropriate resources  Description: INTERVENTIONS:  - Identify barriers to discharge w/patient and caregiver  - Arrange for needed discharge resources and transportation as appropriate  - Identify discharge learning needs (meds, wound care, etc )  - Arrange for interpretive services to assist at discharge as needed  - Refer to Case Management Department for coordinating discharge planning if the patient needs post-hospital services based on physician/advanced practitioner order or complex needs related to functional status, cognitive ability, or social support system  Outcome: Not Progressing     Problem: Knowledge Deficit  Goal: Patient/family/caregiver demonstrates understanding of disease process, treatment plan, medications, and discharge instructions  Description: Complete learning assessment and assess knowledge base    Interventions:  - Provide teaching at level of understanding  - Provide teaching via preferred learning methods  Outcome: Not Progressing

## 2023-05-04 NOTE — ASSESSMENT & PLAN NOTE
Patient presents with whole body rash since taking clindamycin on 5/2  Denies SOB, throat close or itching    · Likely drug/allergic reaction from clindamycin  · CXR without acute findings, patient stable on room air  · C/w IV Solu-Medrol 125 mg daily x 3 doses, scheduled Benadryl 25 mg q6hrs & Pepcid 20mg BID  · Pending completion of IV meds and clinical improvement, hopefully can discharge tomorrow   · Supportive care

## 2023-05-04 NOTE — CONSULTS
Consultation - Infectious Disease   Beatrice King 50 y o  male MRN: [id number]  Unit/Bed#: -01 Encounter: [id number]      IMPRESSION & RECOMMENDATIONS:   Impression/Recommendations: This is a 50 y o  male, status post right distal biceps tendon repair on 5/1 for a traumatic rupture, presented to orthopedic surgery's office yesterday with acute fever and diffuse rash  1   Fever with rash  This is most likely drug reaction, with clindamycin being the most likely culprit  Patient is clinically and systemically well, without evidence of obvious active infection  Patient is already off clindamycin and he is receiving systemic corticosteroid  Admission blood cultures have no growth thus far  Avoid further clindamycin  IV vancomycin for prophylaxis  No additional antibiotic  Monitor temperature  Monitor rash  Continue systemic corticosteroids per primary service  Follow-up on pending blood cultures  2   Leukocytosis  It appears that blood was drawn for CBC after patient received a dose of systemic corticosteroid  Leukocytosis is most likely steroid effect  As in above, there is no obvious active infection  No additional antibiotic other than vancomycin prophylaxis, as in above  Monitor WBC  3   Elevated LFTs, mostly transaminitis  This is most likely part of adverse drug reaction above  Abdominal exam is benign  No evidence of biliary obstruction clinically  Per chart review, patient has had intermittent transaminitis previously  Monitor LFTs  4   Elevated creatinine, likely part of critical reaction also  Monitor creatinine  5  Status post right distal biceps tendon repair, with patient on antibiotic prophylaxis  He had been on clindamycin, which was discontinued  He is currently on IV vancomycin  At discharge, antibiotic regimen can be changed to doxycycline/azithromycin, to continue until dressings are removed and wound is healing well  IV vancomycin while inpatient    At discharge, transition to p o  doxycycline/azithromycin  Can continue prophylaxis until surgical dressing are removed and wound is confirmed to be healing well  6   Prior PCN allergy and now, with additional clindamycin allergy  Given active drug reaction, will avoid beta-lactam antibiotics for now  Prior records reviewed in detail  Discussed with patient in detail regarding the above plan  Thank you for this consultation  We will follow along with you  HISTORY OF PRESENT ILLNESS:  Reason for Consult: Fever and rash  HPI: Agatha Alba is a 50 y o  male, with a few stable medical problems outlined below, sustaining a traumatic rupture of right distal biceps tendon  Patient underwent repair of right biceps tendon on 5/1  He was given clindamycin postoperative antibiotic  Patient presented to orthopedics routine follow-up yesterday with fever and a diffuse rash  He was sent to ER for evaluation  On presentation, patient had fever and diffuse rash  He was given IV Solu-Medrol and admitted  Systemic corticosteroid was continued  Clindamycin was discontinued  We are asked to evaluate the patient  Today, patient states that his pruritus really has not improved yet  No breathing difficulties  No swallowing difficulties  Pain at right bicep surgical repair is mild and stable  Patient states that he also has allergy to penicillin, with similar reaction  REVIEW OF SYSTEMS:  A complete system-based review was done  Except for what is noted in HPI above, ROS of systems is otherwise negative      PAST MEDICAL HISTORY:  Past Medical History:   Diagnosis Date    Anxiety     GERD (gastroesophageal reflux disease)     OCD (obsessive compulsive disorder)     Sleep apnea     USES CPAP HS    Umbilical hernia     last assessed 08/30/17     Past Surgical History:   Procedure Laterality Date    ARTHROSCOPY KNEE Left     BICEPS TENDON REPAIR Right 5/1/2023    Procedure: REPAIR TENDON BICEPS- right and all necessary procedures;  Surgeon: Tito Lei;  Location:  MAIN OR;  Service: Orthopedics    HERNIA REPAIR  2017    KNEE SURGERY  4021    [place] RPR UMBILICAL HRNA 5 YRS/> REDUCIBLE N/A 10/27/2017    Procedure: REPAIR HERNIA UMBILICAL with mesh;  Surgeon: Nessa Arellano MD;  Location:  MAIN OR;  Service: General     Problem list reviewed  FAMILY HISTORY:  Non-contributory    SOCIAL HISTORY:  Social History     Substance and Sexual Activity   Alcohol Use Yes    Alcohol/week: 5 0 standard drinks    Types: 5 Cans of beer per week     Social History     Substance and Sexual Activity   Drug Use No     Social History     Tobacco Use   Smoking Status Never   Smokeless Tobacco Former    Types: Snuff    Quit date: 8/10/2020       ALLERGIES:  Allergies   Allergen Reactions    Penicillin V Hives    Clindamycin Rash     Whole body rash, no airway symptoms       MEDICATIONS:  All current active medications have been reviewed  Patient has been on clindamycin  PHYSICAL EXAM:  Vitals:  Temp:  [99 4 °F (37 4 °C)-102 1 °F (38 9 °C)] 99 9 °F (37 7 °C)  HR:  [] 92  Resp:  [16-20] 20  BP: (112-119)/(65-74) 117/65  SpO2:  [94 %-100 %] 95 %  Temp (24hrs), Av 7 °F (38 2 °C), Min:99 4 °F (37 4 °C), Max:102 1 °F (38 9 °C)  Current: Temperature: 99 9 °F (37 7 °C)     Physical Exam:  General: Obese, comfortable, nontoxic, in no acute distress  Awake, alert and oriented x 3  Eyes:  Conjunctive clear with no hemorrhages or effusions  Oropharynx:  No ulcers, no lesions, pharynx benign, no tonsillitis  Neck:  Supple, no lymphadenopathy, no mass, nontender  Lungs:  Expansion symmetric, no rales, no wheezing, no accessory muscle use  Cardiac:  Regular rate and rhythm, normal S1, normal S2, no murmurs  Abdomen:  Soft, nondistended, non-tender, no HSM  Extremities: Right arm with surgical dressing in place  Dressing is dry  Mild tenderness    Skin: Diffuse erythematous rash, no petechiae, no vesicles, no ulcers  Neurological:  Moves all four extremities spontaneously, sensation grossly intact    LABS, IMAGING, & OTHER STUDIES:  Lab Results:  I have personally reviewed pertinent labs  Results from last 7 days   Lab Units 05/03/23  1538 05/01/23  1434 04/27/23  1122   POTASSIUM mmol/L 3 9 4 0 4 0   CHLORIDE mmol/L 99 102 105   CO2 mmol/L 26 30 30   BUN mg/dL 17 16 15   CREATININE mg/dL 1 53* 1 11 1 09   EGFR ml/min/1 73sq m 52 77 78   CALCIUM mg/dL 8 3* 8 4 8 9   AST U/L 66*  --   --    ALT U/L 63*  --   --    ALK PHOS U/L 51  --   --      Results from last 7 days   Lab Units 05/04/23  0509 05/03/23  1538 04/27/23  1122   WBC Thousand/uL 16 71* 16 04* 5 46   HEMOGLOBIN g/dL 14 4 17 4* 16 4   PLATELETS Thousands/uL 201 204 229     Results from last 7 days   Lab Units 05/03/23  1816 05/03/23  1538   BLOOD CULTURE  Received in Microbiology Lab  Culture in Progress  Received in Microbiology Lab  Culture in Progress  Imaging Studies:   I have personally reviewed pertinent imaging study reports and images in PACS  CXR reviewed personally  No infiltrates  EKG, Pathology, and Other Studies:   I have personally reviewed pertinent reports

## 2023-05-04 NOTE — ASSESSMENT & PLAN NOTE
S/p right bicep tendon repair with orthopedics on 5/1/23, was discharged with clindamycin    · PRN Oxycodone IR and morphine for pain  · Orthopedic surgery consult:  · Typically only treat with 2 days postop prophylaxis, already completed  · NWB RUE with sling  · Hold Tylenol with elevated LFTs

## 2023-05-04 NOTE — PHYSICAL THERAPY NOTE
"   PT EVALUATION    Pt is not in need of cont skilled PT services as pt is independent with all gait and functional mobility without an AD  Will dc IP PT orders  05/04/23 0912   PT Last Visit   PT Visit Date 05/04/23   Note Type   Note type Evaluation   Pain Assessment   Pain Assessment Tool 0-10   Pain Score No Pain   Restrictions/Precautions   Braces or Orthoses Sling  (RUE)   Other Precautions   (whole body rash)   Home Living   Type of [address] [place] [address] One level;Stairs to enter with rails  (2 ENOCH)   Prior Function   Level of [place] Independent with ADLs; Independent with functional mobility; Independent with IADLS   Lives With Significant other   Receives Help From Family   IADLs Independent with driving; Independent with meal prep; Independent with medication management   Vocational Full time employment  ()   Comments Pt amb w/out an AD PTA   General   Additional Pertinent History Pt is s/p R bicep tendong repair on 5/1/23  Pt presents with a whole body rash due to Clindamycin on 5/2/23     Cognition   Overall Cognitive Status WFL   Arousal/Participation Cooperative   Orientation Level Oriented X4   Following Commands Follows all commands and directions without difficulty   Subjective   Subjective \"This happened to me once before\"   RLE Assessment   RLE Assessment WFL   LLE Assessment   LLE Assessment WFL   Bed Mobility   Supine to Sit 7  Independent   Sit to Supine 7  Independent   Transfers   Sit to Stand 7  Independent   Stand to Sit 7  Independent   Ambulation/Elevation   Gait pattern WNL   Gait Assistance 7  Independent   Assistive Device None   Distance 250 feet with change in direction   Balance   Static Sitting Normal   Dynamic Sitting Normal   Static Standing Normal   Dynamic Standing Normal   Ambulatory Normal   Activity Tolerance   Activity Tolerance Patient tolerated treatment well   Assessment   Problem List   (whole body rash)   Assessment Patient seen for Physical " Therapy evaluation  Patient admitted with Allergic drug rash  Comorbidities affecting patient's physical performance include: SIRS, LEANNE, lumbar disc disease with radiculpathy, chronic pain syndrome  Personal factors affecting patient at time of initial evaluation include: lives in one story house and stairs to enter home  Prior to admission, patient was independent with functional mobility without assistive device, independent with ADLS, independent with IADLS, living with girl friend in a one level home with 2 steps to enter, ambulating household distance, ambulating community distances and works full time  Please find objective findings from Physical Therapy assessment regarding body systems outlined above with impairments and limitations including whole body rash  The Barthel Index was used as a functional outcome tool presenting with a score of Barthel Index Score: 100 today indicating no limitations of functional mobility and ADLS  Patient's clinical presentation is currently evolving as seen in patient's presentation of whole body rash  Pt is not in need of cont skilled PT services as pt is independent with all gait and functional mobility without an AD  No further skilled PT needs  Will dc IP PT orders  As demonstrated by objective findings, the assigned level of complexity for this evaluation is low  The patient's AM-PAC Basic Mobility Inpatient Short Form Raw Score is 24  A Raw score of greater than 16 suggests the patient may benefit from discharge to home  Please also refer to the recommendation of the Physical Therapist for safe discharge planning  Goals   Patient Goals get better and go home   Plan   Treatment/Interventions ADL retraining;Functional transfer training;LE strengthening/ROM; Elevations; Therapeutic exercise; Endurance training;Patient/family training;Equipment eval/education; Bed mobility;Gait training; Compensatory technique education;Spoke to nursing;OT;Spoke to case management   PT "Frequency Other (Comment)  (1x visit only)   Recommendation   PT Discharge Recommendation No rehabilitation needs   Additional Comments Pt reports independence with ADLs, gait and functional mobility following R bicep tendon repair on 5/1/23  Pt was able to don pants independently for PT prior to initiating trans and amb in the hallway this am  Pt also has a \"girl friend\" who can assist him if needed  No further skilled PT needs - pt is independent   AM-PAC Basic Mobility Inpatient   Turning in Flat Bed Without Bedrails 4   Lying on Back to Sitting on Edge of Flat Bed Without Bedrails 4   Moving Bed to Chair 4   Standing Up From Chair Using Arms 4   Walk in Room 4   Climb 3-5 Stairs With Railing 4   Basic Mobility Inpatient Raw Score 24   Basic Mobility Standardized Score 57 68   Highest Level Of Mobility   JH-HLM Goal 8: Walk 250 feet or more   JH-HLM Achieved 8: Walk 250 feet ot more   Barthel Index   Feeding 10   Bathing 5   Grooming Score 5   Dressing Score 10   Bladder Score 10   Bowels Score 10   Toilet Use Score 10   Transfers (Bed/Chair) Score 15   Mobility (Level Surface) Score 15   Stairs Score 10   Barthel Index Score 100   End of Consult   Patient Position at End of Consult Seated edge of bed; All needs within reach   End of Consult Comments Pt encouraged to amb ad [place] on unit while admitted     Licensure   [place] License Number  [id number] [place] [address] [id number]     "

## 2023-05-04 NOTE — PROGRESS NOTES
Elaine Carter is a 50 y o  male who is currently ordered Vancomycin IV with management by the Pharmacy Consult service  Relevant clinical data and objective / subjective history reviewed  Vancomycin Assessment:  Indication and Goal AUC/Trough: Soft tissue (goal -600, trough >10), -600, trough >10  Clinical Status:  New Start  Micro:     Renal Function:  SCr: 1 23 mg/dL  CrCl: 98 3 mL/min  Renal replacement: Not on dialysis  Days of Therapy: 1  Current Dose: Vanco 2g IV LOAD ONCE  Vancomycin Plan:  New Dosing: Vanco 1g IV q12hrs  Estimated AUC: 428 mcg*hr/mL  Estimated Trough: 14 2 mcg/mL  Next Level: AM LABS 05/05  Renal Function Monitoring: Daily BMP and UOP  Pharmacy will continue to follow closely for s/sx of nephrotoxicity, infusion reactions and appropriateness of therapy  BMP and CBC will be ordered per protocol  We will continue to follow the patients culture results and clinical progress daily      Chilo Castellano, Pharmacist

## 2023-05-04 NOTE — ASSESSMENT & PLAN NOTE
· Mildly elevated LFTs on arrival: AST 66 / ALT 63  · Likely in setting of adverse drug reaction as above  · LFTs trending down, benign abdominal exam  · Monitor CMP while inpatient

## 2023-05-05 VITALS
TEMPERATURE: 97.9 F | BODY MASS INDEX: 35.52 KG/M2 | RESPIRATION RATE: 18 BRPM | OXYGEN SATURATION: 96 % | SYSTOLIC BLOOD PRESSURE: 135 MMHG | HEIGHT: 73 IN | WEIGHT: 268.01 LBS | HEART RATE: 72 BPM | DIASTOLIC BLOOD PRESSURE: 88 MMHG

## 2023-05-05 PROBLEM — E83.39 HYPOPHOSPHATEMIA: Status: RESOLVED | Noted: 2023-05-04 | Resolved: 2023-05-05

## 2023-05-05 LAB
ALBUMIN SERPL BCP-MCNC: 3.1 G/DL (ref 3.5–5)
ALP SERPL-CCNC: 37 U/L (ref 34–104)
ALT SERPL W P-5'-P-CCNC: 41 U/L (ref 7–52)
ANION GAP SERPL CALCULATED.3IONS-SCNC: 7 MMOL/L (ref 4–13)
AST SERPL W P-5'-P-CCNC: 25 U/L (ref 13–39)
BILIRUB SERPL-MCNC: 0.39 MG/DL (ref 0.2–1)
BUN SERPL-MCNC: 15 MG/DL (ref 5–25)
CALCIUM ALBUM COR SERPL-MCNC: 8.4 MG/DL (ref 8.3–10.1)
CALCIUM SERPL-MCNC: 7.7 MG/DL (ref 8.4–10.2)
CHLORIDE SERPL-SCNC: 109 MMOL/L (ref 96–108)
CO2 SERPL-SCNC: 23 MMOL/L (ref 21–32)
CREAT SERPL-MCNC: 0.92 MG/DL (ref 0.6–1.3)
ERYTHROCYTE [DISTWIDTH] IN BLOOD BY AUTOMATED COUNT: 12.4 % (ref 11.6–15.1)
GFR SERPL CREATININE-BSD FRML MDRD: 96 ML/MIN/1.73SQ M
GLUCOSE P FAST SERPL-MCNC: 101 MG/DL (ref 65–99)
GLUCOSE SERPL-MCNC: 101 MG/DL (ref 65–140)
HCT VFR BLD AUTO: 38.3 % (ref 36.5–49.3)
HGB BLD-MCNC: 12.5 G/DL (ref 12–17)
MAGNESIUM SERPL-MCNC: 1.9 MG/DL (ref 1.9–2.7)
MCH RBC QN AUTO: 32.1 PG (ref 26.8–34.3)
MCHC RBC AUTO-ENTMCNC: 32.6 G/DL (ref 31.4–37.4)
MCV RBC AUTO: 98 FL (ref 82–98)
PHOSPHATE SERPL-MCNC: 3.2 MG/DL (ref 2.7–4.5)
PLATELET # BLD AUTO: 188 THOUSANDS/UL (ref 149–390)
PMV BLD AUTO: 11.1 FL (ref 8.9–12.7)
POTASSIUM SERPL-SCNC: 3.6 MMOL/L (ref 3.5–5.3)
PROT SERPL-MCNC: 5.4 G/DL (ref 6.4–8.4)
RBC # BLD AUTO: 3.9 MILLION/UL (ref 3.88–5.62)
SODIUM SERPL-SCNC: 139 MMOL/L (ref 135–147)
VANCOMYCIN SERPL-MCNC: <5 UG/ML (ref 10–20)
WBC # BLD AUTO: 12.75 THOUSAND/UL (ref 4.31–10.16)

## 2023-05-05 RX ORDER — DOXYCYCLINE HYCLATE 100 MG/1
100 CAPSULE ORAL EVERY 12 HOURS SCHEDULED
Status: DISCONTINUED | OUTPATIENT
Start: 2023-05-05 | End: 2023-05-05 | Stop reason: HOSPADM

## 2023-05-05 RX ORDER — AZITHROMYCIN 500 MG/1
500 TABLET, FILM COATED ORAL EVERY 24 HOURS
Status: DISCONTINUED | OUTPATIENT
Start: 2023-05-05 | End: 2023-05-05 | Stop reason: HOSPADM

## 2023-05-05 RX ADMIN — DOXYCYCLINE 100 MG: 100 CAPSULE ORAL at 08:54

## 2023-05-05 RX ADMIN — HEPARIN SODIUM 5000 UNITS: 5000 INJECTION INTRAVENOUS; SUBCUTANEOUS at 05:05

## 2023-05-05 RX ADMIN — SODIUM CHLORIDE 125 ML/HR: 0.9 INJECTION, SOLUTION INTRAVENOUS at 02:58

## 2023-05-05 RX ADMIN — METHYLPREDNISOLONE SODIUM SUCCINATE 125 MG: 125 INJECTION, POWDER, FOR SOLUTION INTRAMUSCULAR; INTRAVENOUS at 08:54

## 2023-05-05 RX ADMIN — AZITHROMYCIN 500 MG: 500 TABLET, FILM COATED ORAL at 09:11

## 2023-05-05 RX ADMIN — ESCITALOPRAM OXALATE 10 MG: 10 TABLET ORAL at 08:54

## 2023-05-05 RX ADMIN — DIPHENHYDRAMINE HYDROCHLORIDE 25 MG: 50 INJECTION, SOLUTION INTRAMUSCULAR; INTRAVENOUS at 02:51

## 2023-05-05 RX ADMIN — DIPHENHYDRAMINE HYDROCHLORIDE 25 MG: 50 INJECTION, SOLUTION INTRAMUSCULAR; INTRAVENOUS at 08:53

## 2023-05-05 RX ADMIN — PANTOPRAZOLE SODIUM 40 MG: 40 TABLET, DELAYED RELEASE ORAL at 05:05

## 2023-05-05 RX ADMIN — FAMOTIDINE 20 MG: 10 INJECTION, SOLUTION INTRAVENOUS at 08:53

## 2023-05-05 RX ADMIN — SODIUM CHLORIDE 125 ML/HR: 0.9 INJECTION, SOLUTION INTRAVENOUS at 10:52

## 2023-05-05 RX ADMIN — LIDOCAINE 5% 2 PATCH: 700 PATCH TOPICAL at 08:54

## 2023-05-05 NOTE — NURSING NOTE
2040 pt alert and oriented in bed watching TV  Pt appears red on entire body  No difficulty swallowing and pt is not SOB  Complaints of B/L knee pain

## 2023-05-05 NOTE — PLAN OF CARE
Problem: PAIN - ADULT  Goal: Verbalizes/displays adequate comfort level or baseline comfort level  Description: Interventions:  - Encourage patient to monitor pain and request assistance  - Assess pain using appropriate pain scale  - Administer analgesics based on type and severity of pain and evaluate response  - Implement non-pharmacological measures as appropriate and evaluate response  - Consider cultural and social influences on pain and pain management  - Notify physician/advanced practitioner if interventions unsuccessful or patient reports new pain  Outcome: Progressing     Problem: INFECTION - ADULT  Goal: Absence or prevention of progression during hospitalization  Description: INTERVENTIONS:  - Assess and monitor for signs and symptoms of infection  - Monitor lab/diagnostic results  - Monitor all insertion sites, i e  indwelling lines, tubes, and drains  - Monitor endotracheal if appropriate and nasal secretions for changes in amount and color  - [place] appropriate cooling/warming therapies per order  - Administer medications as ordered  - Instruct and encourage patient and family to use good hand hygiene technique  - Identify and instruct in appropriate isolation precautions for identified infection/condition  Outcome: Progressing  Goal: Absence of fever/infection during neutropenic period  Description: INTERVENTIONS:  - Monitor WBC    Outcome: Progressing     Problem: SAFETY ADULT  Goal: Patient will remain free of falls  Description: INTERVENTIONS:  - Educate patient/family on patient safety including physical limitations  - Instruct patient to call for assistance with activity   - Consult OT/PT to assist with strengthening/mobility   - Keep Call bell within reach  - Keep bed low and locked with side rails adjusted as appropriate  - Keep care items and personal belongings within reach  - Initiate and maintain comfort rounds  - Make Fall Risk Sign visible to staff  - Offer Toileting every 2 Hours, in advance of need  - Initiate/Maintain bed alarm  - Obtain necessary fall risk management equipment:   - Apply yellow socks and bracelet for high fall risk patients  - Consider moving patient to room near nurses station  Outcome: Progressing  Goal: Maintain or return to baseline ADL function  Description: INTERVENTIONS:  -  Assess patient's ability to carry out ADLs; assess patient's baseline for ADL function and identify physical deficits which impact ability to perform ADLs (bathing, care of mouth/teeth, toileting, grooming, dressing, etc )  - Assess/evaluate cause of self-care deficits   - Assess range of motion  - Assess patient's mobility; develop plan if impaired  - Assess patient's need for assistive devices and provide as appropriate  - Encourage maximum independence but intervene and supervise when necessary  - Involve family in performance of ADLs  - Assess for home care needs following discharge   - Consider OT consult to assist with ADL evaluation and planning for discharge  - Provide patient education as appropriate  Outcome: Progressing  Goal: Maintains/Returns to pre admission functional level  Description: INTERVENTIONS:  - Perform BMAT or MOVE assessment daily    - Set and communicate daily mobility goal to care team and patient/family/caregiver  - Collaborate with rehabilitation services on mobility goals if consulted  - Perform Range of Motion 3 times a day  - Reposition patient every 2 hours    - Dangle patient 3 times a day  - Stand patient 3 times a day  - Ambulate patient 3 times a day  - Out of bed to chair 3 times a day   - Out of bed for meals 3 times a day  - Out of bed for toileting  - Record patient progress and toleration of activity level   Outcome: Progressing     Problem: DISCHARGE PLANNING  Goal: Discharge to home or other facility with appropriate resources  Description: INTERVENTIONS:  - Identify barriers to discharge w/patient and caregiver  - Arrange for needed discharge resources and transportation as appropriate  - Identify discharge learning needs (meds, wound care, etc )  - Arrange for interpretive services to assist at discharge as needed  - Refer to Case Management Department for coordinating discharge planning if the patient needs post-hospital services based on physician/advanced practitioner order or complex needs related to functional status, cognitive ability, or social support system  Outcome: Progressing     Problem: Knowledge Deficit  Goal: Patient/family/caregiver demonstrates understanding of disease process, treatment plan, medications, and discharge instructions  Description: Complete learning assessment and assess knowledge base    Interventions:  - Provide teaching at level of understanding  - Provide teaching via preferred learning methods  Outcome: Progressing     Problem: MUSCULOSKELETAL - ADULT  Goal: Maintain or return mobility to safest level of function  Description: INTERVENTIONS:  - Assess patient's ability to carry out ADLs; assess patient's baseline for ADL function and identify physical deficits which impact ability to perform ADLs (bathing, care of mouth/teeth, toileting, grooming, dressing, etc )  - Assess/evaluate cause of self-care deficits   - Assess range of motion  - Assess patient's mobility  - Assess patient's need for assistive devices and provide as appropriate  - Encourage maximum independence but intervene and supervise when necessary  - Involve family in performance of ADLs  - Assess for home care needs following discharge   - Consider OT consult to assist with ADL evaluation and planning for discharge  - Provide patient education as appropriate  Outcome: Progressing  Goal: Maintain proper alignment of affected body part  Description: INTERVENTIONS:  - Support, maintain and protect limb and body alignment  - Provide patient/ family with appropriate education  Outcome: Progressing

## 2023-05-05 NOTE — ASSESSMENT & PLAN NOTE
Rash likely associated with preop antiseptic cleanser, same presentation 5 years ago with preop hernia surgery, with sparing of face and genitals per antiseptic cleanser  At that time was diagnosed with a PCN allergy    Patient presents with whole body rash since taking clindamycin on 5/2  Denies SOB, throat close or itching    · Likely drug reaction from antiseptic cleanser,   · less likely clindamycin/PCN, due to taking antibiotics in the past without reaction/SJS  · CXR: Without acute findings, patient stable on room air  · Completed IV Solu-Medrol 125 mg daily x 3 doses, scheduled Benadryl 25 mg q6hrs & Pepcid 20mg BID  · Supportive care  · Trying to identify name of antiseptic cleanser in maroon bag to place on allergy list

## 2023-05-05 NOTE — PROGRESS NOTES
Progress Note - Infectious Disease   Sherilyn Mcardle 50 y o  male MRN: [id number]  Unit/Bed#: -01 Encounter: [id number]      Impression/Recommendations:  1  Fever with rash  This is most likely drug reaction, with clindamycin being the most likely culprit  Patient remains clinically and systemically well, without evidence of obvious active infection  Patient is already off clindamycin and he is receiving systemic corticosteroid  Admission blood cultures have no growth thus far  Rash persists but fever has resolved  Avoid further clindamycin  Antibiotic prophylaxis as in below  Monitor temperature  Monitor rash  Continue systemic corticosteroids per primary service  Follow-up on pending blood cultures      2   Leukocytosis  It appears that blood was drawn for CBC after patient received a dose of systemic corticosteroid  Leukocytosis is most likely steroid effect  As in above, there is no obvious active infection  WBC decreasing  Monitor WBC      3   Elevated LFTs, mostly transaminitis  This is most likely part of adverse drug reaction above  Abdominal exam is benign  No evidence of biliary obstruction clinically  LFTs have normalized  Monitor LFTs      4   Elevated creatinine, likely part of critical reaction also  Monitor creatinine      5  Status post right distal biceps tendon repair, with patient on antibiotic prophylaxis  He had been on clindamycin, which was discontinued  Antibiotic prophylaxis was changed to IV vancomycin  This was discontinued for unclear reason  Regardless, antibiotic prophylaxis can be transitioned to p o  Transition to p o  doxycycline/azithromycin  Can continue prophylaxis until surgical dressing are removed and wound is confirmed to be healing well      6   Prior PCN allergy and now, with additional clindamycin allergy    Given active drug reaction, will avoid beta-lactam antibiotics for now      Discussed with patient in detail regarding the above plan      Antibiotics:  Vancomycin  POD # 4    Subjective:  Patient's pruritus is improved, although he still has significant rash  Temperature is down  No chills  He is tolerating antibiotic well  No nausea, vomiting or diarrhea  Objective:  Vitals:  Temp:  [97 9 °F (36 6 °C)-98 5 °F (36 9 °C)] 97 9 °F (36 6 °C)  HR:  [68-83] 72  Resp:  [18-20] 18  BP: (112-135)/(61-88) 135/88  SpO2:  [93 %-96 %] 96 %  Temp (24hrs), Av 2 °F (36 8 °C), Min:97 9 °F (36 6 °C), Max:98 5 °F (36 9 °C)  Current: Temperature: 97 9 °F (36 6 °C)    Physical Exam:     General: Awake, alert, cooperative, no distress  Neck:  Supple  No mass  No lymphadenopathy  Lungs: Expansion symmetric, no rales, no wheezing, respirations unlabored  Heart:  Regular rate and rhythm, S1 and S2 normal, no murmur  Abdomen: Soft, nondistended, non-tender, bowel sounds active all four quadrants, no masses, no organomegaly  Extremities: Right arm with dressing and splint in place  Stable mild tenderness  Skin:  Stable diffuse erythroderma rash  No ulcer  No vesicles  Neuro: Moves all extremities  Invasive Devices     Peripheral Intravenous Line  Duration           Peripheral IV 23 Dorsal (posterior); Left Hand <1 day                Labs studies:   I have personally reviewed pertinent labs    Results from last 7 days   Lab Units 23  0500 23  0509 23  1538   POTASSIUM mmol/L 3 6 4 1 3 9   CHLORIDE mmol/L 109* 103 99   CO2 mmol/L 23 25 26   BUN mg/dL 15 17 17   CREATININE mg/dL 0 92 1 23 1 53*   EGFR ml/min/1 73sq m 96 68 52   CALCIUM mg/dL 7 7* 8 2* 8 3*   AST U/L 25 46* 66*   ALT U/L 41 53* 63*   ALK PHOS U/L 37 46 51     Results from last 7 days   Lab Units 23  0500 23  0509 23  1538   WBC Thousand/uL 12 75* 16 71* 16 04*   HEMOGLOBIN g/dL 12 5 14 4 17 4*   PLATELETS Thousands/uL 188 201 204     Results from last 7 days   Lab Units 23  1816 23  1538   BLOOD CULTURE  No Growth at 24 hrs  No Growth at 24 hrs  Imaging Studies:   I have personally reviewed pertinent imaging study reports and images in PACS  EKG, Pathology, and Other Studies:   I have personally reviewed pertinent reports

## 2023-05-05 NOTE — DISCHARGE SUMMARY
Mansi U  68   Discharge- Ponce Lee 1970, 50 y o  male MRN: [id number]  Unit/Bed#: -01 Encounter: [id number]  Primary Care Provider: Luis M Donald MD   Date and time admitted to hospital: 5/3/2023  2:45 PM    * Allergic drug rash  Assessment & Plan  Rash likely associated with preop antiseptic cleanser 2% Chlorhexidine Gluconate Clothes, same presentation 5 years ago with preop hernia surgery, with sparing of face and genitals per antiseptic cleanser  At that time was diagnosed with a PCN allergy    Patient presents with whole body rash since taking clindamycin on 5/2  Denies SOB, throat close or itching  · Likely drug reaction from antiseptic cleanser,   · less likely clindamycin/PCN, due to taking antibiotics in the past without reaction/SJS  · CXR: Without acute findings, patient stable on room air  · Completed IV Solu-Medrol 125 mg daily x 3 doses, scheduled Benadryl 25 mg q6hrs & Pepcid 20mg BID  · Supportive care  · Trying to identify name of antiseptic cleanser in maroon bag to place on allergy list    SIRS (systemic inflammatory response syndrome) ([facility] [address] )  Assessment & Plan  POA with leukocytosis, fever, mild tachycardia  Likely related to adverse drug reaction as above  · Procal: 0 29->0 38-likely inflammatory response/ CRP: 118->146  · BC x2 NGTD 24 H  · ID consult:  · IV vancomycin for ppx,  No need for further abx  · Avoid further clindamycin  · Transition to p o  doxycycline/azithromycin  Prophylactic  · Follow culture results      Rupture of right distal biceps tendon  Assessment & Plan  S/p right bicep tendon repair with orthopedics on 5/1/23, was discharged with clindamycin    · PRN Oxycodone IR and morphine for pain  · Orthopedic surgery consult:  · Typically only treat with 2 days postop prophylaxis, already completed  · NWB RUE with sling  · F/U outpatient  · Restart Tylenol with elevated LFTs    Elevated LFTs  Assessment & Plan  · Mildly elevated LFTs on arrival: AST 66 / ALT 63  · Likely in setting of adverse drug reaction as above  · LFTs trending down, benign abdominal exam  · Monitor CMP while inpatient    Adjustment reaction with anxiety and depression  Assessment & Plan  · Continue home Lexapro 10 mg, follow-up with PCP to wean or determine necessity of use    Esophageal reflux  Assessment & Plan  · Continue PPI 40 mg    Hypophosphatemia-resolved as of 5/5/2023  Assessment & Plan  · Mildly decreased phosphorus, 2 0  · Replete phosphorus  · Monitor while inpatient      Medical Problems     Resolved Problems  Date Reviewed: 5/5/2023          Resolved    Hypophosphatemia 5/5/2023     Resolved by  Mary Carmen Barton MD        Discharging Physician / Practitioner: Mary Carmen Barton MD  PCP: Milo Tabares MD  Admission Date:   Admission Orders (From admission, onward)     Ordered        05/03/23 1633  Place in Observation  Once                      Discharge Date: 05/05/23    Consultations During Hospital Stay:  · Ortho, ID    Procedures Performed:   · N/A    Significant Findings / Test Results:   · N/A    Incidental Findings:   · N/A    Test Results Pending at Discharge (will require follow up):   · N/A     Outpatient Tests Requested:  · N/A    Complications: N/A    Reason for Admission: Allergic reaction    Hospital Course:   Mike Bryant is a 50 y o  male patient who originally presented to the hospital on 5/3/2023 due to allergic reaction  PTA patient had a bicep tendon repair 5/1/2023 and was given clindamycin with a secondary diffuse rash  Patient reported similar presentation 5 years ago while undergoing a preop procedure in which he used the same cleansing substances Chlorhexidine Gluconate Clothes (CHG-maroon bag) prior to surgery  Patient reported that surgical wash was not placed on face or genitals as directed on package and had the same allergic response as this time with sparing of face and genitals    Patient reported history of penicillin family of "medicines in the past without any kind of allergic response  At this point due to's exact same presentation with same cleansing materials more likely determined rashes secondary to preop wash versus penicillin or clindamycin  Patient denied any mouth sores, difficulty swallowing; all reaction diffusely on body where wash was used  Once the name of bathing wash identified will place on allergy list   Patient s/p right bicep tendon tendon repair 5/1/2023 seen by Ortho will follow-up with Ortho on discharge  Patient has history of reflux will discharge with Protonix  Patient has history of acute anxiety and depression, continue home Lexapro  All other chronic conditions controlled by home medication and patient closely  Please see above list of diagnoses and related plan for additional information  Condition at Discharge: fair    Discharge Day Visit / Exam:   Subjective: Patient seen and examined at bedside, reported rash reported after using preop sticky antiseptic cleaning agent  Upon inquiry patient reported similar rash prior to hernia repair surgery 5 years ago  Noted patient did not have rash on face or genitals, reported bosses do not use on face or genitals  Patient reported previous use of penicillin meds, Augmentin family without any side effects  Informed patient rash is likely secondary to antiseptic wash versus allergic reaction to penicillin or clindamycin  Patient denied any mouth pain, difficulty swallowing, chest pain, shortness of breath  Patient denied any nausea vomiting diarrhea constipation  Vitals: Blood Pressure: 135/88 (05/05/23 0709)  Pulse: 72 (05/05/23 0709)  Temperature: 97 9 °F (36 6 °C) (05/05/23 0709)  Temp Source: Oral (05/05/23 0709)  Respirations: 18 (05/05/23 0709)  Height: 6' 1\" (185 4 cm) (05/03/23 1715)  Weight - Scale: 122 kg (268 lb 0 2 oz) (05/03/23 1715)  SpO2: 96 % (05/05/23 0709)  Exam:   Physical Exam  Vitals and nursing note reviewed   " Constitutional:       General: He is not in acute distress  Appearance: He is well-developed  He is not ill-appearing  HENT:      Head: Normocephalic and atraumatic  Eyes:      Conjunctiva/sclera: Conjunctivae normal    Cardiovascular:      Rate and Rhythm: Normal rate and regular rhythm  Heart sounds: No murmur heard  Pulmonary:      Effort: Pulmonary effort is normal  No respiratory distress  Breath sounds: Normal breath sounds  Abdominal:      Palpations: Abdomen is soft  Tenderness: There is no abdominal tenderness  Musculoskeletal:         General: No swelling  Cervical back: Neck supple  Skin:     General: Skin is warm and dry  Capillary Refill: Capillary refill takes 2 to 3 seconds  Coloration: Skin is mottled  Findings: Erythema and rash present  Rash is urticarial  Rash is not crusting, macular, nodular, papular, pustular or scaling  Comments: Rash spares face and genitals   Neurological:      Mental Status: He is alert  Psychiatric:         Mood and Affect: Mood normal           Discussion with Family: Patient declined call to   Discharge instructions/Information to patient and family:   See after visit summary for information provided to patient and family  Provisions for Follow-Up Care:  See after visit summary for information related to follow-up care and any pertinent home health orders  Disposition:   Home    Planned Readmission: No     Discharge Statement:  I spent 45 minutes discharging the patient  This time was spent on the day of discharge  I had direct contact with the patient on the day of discharge  Greater than 50% of the total time was spent examining patient, answering all patient questions, arranging and discussing plan of care with patient as well as directly providing post-discharge instructions  Additional time then spent on discharge activities      Discharge Medications:  See after visit summary for reconciled discharge medications provided to patient and/or family        **Please Note: This note may have been constructed using a voice recognition system**

## 2023-05-05 NOTE — ASSESSMENT & PLAN NOTE
S/p right bicep tendon repair with orthopedics on 5/1/23, was discharged with clindamycin    · PRN Oxycodone IR and morphine for pain  · Orthopedic surgery consult:  · Typically only treat with 2 days postop prophylaxis, already completed  · NWB RUE with sling  · F/U outpatient  · Restart Tylenol with elevated LFTs

## 2023-05-05 NOTE — ASSESSMENT & PLAN NOTE
POA with leukocytosis, fever, mild tachycardia  Likely related to adverse drug reaction as above  · Procal: 0 29->0 38-likely inflammatory response/ CRP: 118->146  · BC x2 NGTD 24 H  · ID consult:  · IV vancomycin for ppx,  No need for further abx  · Avoid further clindamycin  · Transition to p o  doxycycline/azithromycin    Prophylactic  · Follow culture results

## 2023-05-05 NOTE — PLAN OF CARE
Problem: PAIN - ADULT  Goal: Verbalizes/displays adequate comfort level or baseline comfort level  Description: Interventions:  - Encourage patient to monitor pain and request assistance  - Assess pain using appropriate pain scale  - Administer analgesics based on type and severity of pain and evaluate response  - Implement non-pharmacological measures as appropriate and evaluate response  - Consider cultural and social influences on pain and pain management  - Notify physician/advanced practitioner if interventions unsuccessful or patient reports new pain  Outcome: Progressing     Problem: INFECTION - ADULT  Goal: Absence or prevention of progression during hospitalization  Description: INTERVENTIONS:  - Assess and monitor for signs and symptoms of infection  - Monitor lab/diagnostic results  - Administer medications as ordered  - Instruct and encourage patient and family to use good hand hygiene technique  Outcome: Progressing  Goal: Absence of fever/infection during neutropenic period  Description: INTERVENTIONS:  - Monitor WBC    Outcome: Progressing     Problem: SAFETY ADULT  Goal: Patient will remain free of falls  Description: INTERVENTIONS:  - Educate patient/family on patient safety including physical limitations  - Instruct patient to call for assistance with activity   - Consult OT/PT to assist with strengthening/mobility   - Keep Call bell within reach  - Keep bed low and locked with side rails adjusted as appropriate  - Keep care items and personal belongings within reach  - Initiate and maintain comfort rounds  - Offer Toileting every 2  Hours, in advance of need  Outcome: Progressing  Goal: Maintain or return to baseline ADL function  Description: INTERVENTIONS:  -  Assess patient's ability to carry out ADLs; assess patient's baseline for ADL function and identify physical deficits which impact ability to perform ADLs (bathing, care of mouth/teeth, toileting, grooming, dressing, etc )  - Assess/evaluate cause of self-care deficits   - Assess range of motion  - Assess patient's mobility; develop plan if impaired  - Assess patient's need for assistive devices and provide as appropriate  - Encourage maximum independence but intervene and supervise when necessary  - Involve family in performance of ADLs  - Assess for home care needs following discharge   - Consider OT consult to assist with ADL evaluation and planning for discharge  - Provide patient education as appropriate  Outcome: Progressing  Goal: Maintains/Returns to pre admission functional level  Description: INTERVENTIONS:  - Perform BMAT or MOVE assessment daily    - Set and communicate daily mobility goal to care team and patient/family/caregiver  - Collaborate with rehabilitation services on mobility goals if consulted  - Perform Range of Motion  3 times a day    - Dangle patient 3  times a day  - Stand patient  3 times a day  - Ambulate patient  3  times a day  - Out of bed to chair  3  times a day   - Out of bed for meals  3 times a day  - Out of bed for toileting  - Record patient progress and toleration of activity level   Outcome: Progressing     Problem: DISCHARGE PLANNING  Goal: Discharge to home or other facility with appropriate resources  Description: INTERVENTIONS:  - Identify barriers to discharge w/patient and caregiver  - Arrange for needed discharge resources and transportation as appropriate  - Identify discharge learning needs (meds, wound care, etc )  - Arrange for interpretive services to assist at discharge as needed  - Refer to Case Management Department for coordinating discharge planning if the patient needs post-hospital services based on physician/advanced practitioner order or complex needs related to functional status, cognitive ability, or social support system  Outcome: Progressing     Problem: Knowledge Deficit  Goal: Patient/family/caregiver demonstrates understanding of disease process, treatment plan, medications, and discharge instructions  Description: Complete learning assessment and assess knowledge base    Interventions:  - Provide teaching at level of understanding  - Provide teaching via preferred learning methods  Outcome: Progressing     Problem: MUSCULOSKELETAL - ADULT  Goal: Maintain or return mobility to safest level of function  Description: INTERVENTIONS:  - Assess patient's ability to carry out ADLs; assess patient's baseline for ADL function and identify physical deficits which impact ability to perform ADLs (bathing, care of mouth/teeth, toileting, grooming, dressing, etc )  - Assess/evaluate cause of self-care deficits   - Assess range of motion  - Assess patient's mobility  - Assess patient's need for assistive devices and provide as appropriate  - Encourage maximum independence but intervene and supervise when necessary  - Involve family in performance of ADLs  - Assess for home care needs following discharge   - Consider OT consult to assist with ADL evaluation and planning for discharge  - Provide patient education as appropriate  Outcome: Progressing  Goal: Maintain proper alignment of affected body part  Description: INTERVENTIONS:  - Support, maintain and protect limb and body alignment  - Provide patient/ family with appropriate education  Outcome: Progressing

## 2023-05-07 LAB — BACTERIA BLD CULT: NORMAL

## 2023-05-08 ENCOUNTER — TRANSITIONAL CARE MANAGEMENT (OUTPATIENT)
Dept: FAMILY MEDICINE CLINIC | Facility: MEDICAL CENTER | Age: 53
End: 2023-05-08

## 2023-05-08 DIAGNOSIS — N52.9 ERECTILE DYSFUNCTION, UNSPECIFIED ERECTILE DYSFUNCTION TYPE: ICD-10-CM

## 2023-05-08 LAB
BACTERIA BLD CULT: NORMAL
BACTERIA BLD CULT: NORMAL

## 2023-05-08 RX ORDER — TADALAFIL 5 MG/1
5 TABLET ORAL DAILY
Qty: 30 TABLET | Refills: 0 | Status: SHIPPED | OUTPATIENT
Start: 2023-05-08

## 2023-05-09 LAB — BACTERIA BLD CULT: NORMAL

## 2023-05-10 ENCOUNTER — OFFICE VISIT (OUTPATIENT)
Dept: OBGYN CLINIC | Facility: CLINIC | Age: 53
End: 2023-05-10

## 2023-05-10 VITALS
SYSTOLIC BLOOD PRESSURE: 133 MMHG | BODY MASS INDEX: 35.54 KG/M2 | DIASTOLIC BLOOD PRESSURE: 87 MMHG | HEIGHT: 73 IN | WEIGHT: 268.2 LBS | HEART RATE: 72 BPM

## 2023-05-10 DIAGNOSIS — S46.211A RUPTURE OF RIGHT DISTAL BICEPS TENDON, INITIAL ENCOUNTER: Primary | ICD-10-CM

## 2023-05-10 NOTE — PROGRESS NOTES
[address] [facility] [address] [address]  [phone]       Sharita Nicholson  [id number]  1970    ORTHOPAEDIC SURGERY OUTPATIENT NOTE  5/10/2023      HISTORY:  50 y o  male  Presents today 10 days s/p right distal biceps tend repair , DOS 5/1/23  He is doing well  He went to the ED on 5/3/23 due to having allergic reaction to the chlorhexidine antiseptic   At the ED he was given IV antibiotics  He states today his symptoms have resolved  He notes some soreness over the incision site  He denies any chills or fevers  Past Medical History:   Diagnosis Date   • Anxiety    • GERD (gastroesophageal reflux disease)    • OCD (obsessive compulsive disorder)    • Sleep apnea     USES CPAP HS   • Umbilical hernia     last assessed 08/30/17       Past Surgical History:   Procedure Laterality Date   • ARTHROSCOPY KNEE Left    • BICEPS TENDON REPAIR Right 5/1/2023    Procedure: REPAIR TENDON BICEPS- right and all necessary procedures;  Surgeon: Levon García;  Location:  MAIN OR;  Service: Orthopedics   • HERNIA REPAIR  2017   • KNEE SURGERY  2444   • MN RPR UMBILICAL HRNA 5 YRS/> REDUCIBLE N/A 10/27/2017    Procedure: REPAIR HERNIA UMBILICAL with mesh;  Surgeon: Rebekah Rahman MD;  Location:  MAIN OR;  Service: General       Social History     Socioeconomic History   • Marital status:      Spouse name: Not on file   • Number of children: Not on file   • Years of education: Not on file   • Highest education level: Not on file   Occupational History   • Not on file   Tobacco Use   • Smoking status: Never   • Smokeless tobacco: Former     Types: Snuff     Quit date: 8/10/2020   Vaping Use   • Vaping Use: Never used   Substance and Sexual Activity   • Alcohol use:  Yes     Alcohol/week: 5 0 standard drinks     Types: 5 Cans of beer per week   • Drug use: No   • Sexual activity: Yes     Partners: Female   Other Topics "Concern   • Not on file   Social History Narrative    Caffeine use     Social Determinants of Health     Financial Resource Strain: Not on file   Food Insecurity: Not on file   Transportation Needs: Not on file   Physical Activity: Not on file   Stress: Not on file   Social Connections: Not on file   Intimate Partner Violence: Not on file   Housing Stability: Not on file       Family History   Problem Relation Age of Onset   • No Known Problems Mother    • Other Father         adopted by father   • Diabetes Son    • Diabetes Son         Patient's Medications   New Prescriptions    No medications on file   Previous Medications    ACETAMINOPHEN (TYLENOL) 500 MG TABLET    Take 1,000 mg by mouth every 6 (six) hours as needed for mild pain    ESCITALOPRAM (LEXAPRO) 10 MG TABLET    TAKE 1 TABLET BY MOUTH EVERY DAY    INDOMETHACIN (INDOCIN SR) 75 MG CR CAPSULE    Take 1 capsule (75 mg total) by mouth daily with breakfast for 14 days    OMEPRAZOLE (PRILOSEC) 40 MG CAPSULE    TAKE 1 CAPSULE BY MOUTH EVERY DAY    OXYCODONE (ROXICODONE) 5 IMMEDIATE RELEASE TABLET    Take 1 tablet (5 mg total) by mouth every 6 (six) hours as needed for moderate pain Max Daily Amount: 20 mg    TADALAFIL (CIALIS) 5 MG TABLET    Take 1 tablet (5 mg total) by mouth daily   Modified Medications    No medications on file   Discontinued Medications    No medications on file       Allergies   Allergen Reactions   • Penicillin V Hives   • Clindamycin Rash     Whole body rash, no airway symptoms   • Chlorhexidine Gluconate Rash     Allergic to CHG clothes, no PCN/Clindamycin        /87 (BP Location: Left arm, Patient Position: Sitting, Cuff Size: Adult)   Pulse 72   Ht 6' 1\" (1 854 m) Comment: verbal  Wt 122 kg (268 lb 3 2 oz)   BMI 35 38 kg/m²      REVIEW OF SYSTEMS:  Constitutional: Negative  HEENT: Negative  Respiratory: Negative  Skin: Negative  Neurological: Negative  Psychiatric/Behavioral: Negative    Musculoskeletal: " Negative except for that mentioned in the HPI     [unfilled]     PHYSICAL EXAM:      Right elbow  Sutures intact over incision site, incision site healing well, minimal erythema, clean dry and intact, no sign of drainage    IMAGING: Not taken today    ASSESSMENT AND PLAN:  50 y o  male is 10 days s/p right distal biceps tend repair , DOS 5/1/23    He is doing well  Sutures were removed in the office today  He may start to shower but do not scrub and submerge incision site in water  He will start with physical therapy, may start range of motion exercises only to gain full motion, may start strengthening exercises in 6 weeks  Will  reevaluate patient in 5 weeks      Scribe Attestation    I,:  Maverick Hunter am acting as a scribe while in the presence of the attending physician :       I,:  Keaton Dhillon personally performed the services described in this documentation    as scribed in my presence :

## 2023-05-11 ENCOUNTER — EVALUATION (OUTPATIENT)
Dept: PHYSICAL THERAPY | Facility: CLINIC | Age: 53
End: 2023-05-11

## 2023-05-11 DIAGNOSIS — S46.211A RUPTURE OF RIGHT DISTAL BICEPS TENDON, INITIAL ENCOUNTER: Primary | ICD-10-CM

## 2023-05-11 NOTE — PROGRESS NOTES
PT Evaluation     Today's date: 2023  Patient name: August Loza  : 1970  MRN: [id number]  Referring provider: Jacoby Theodore DO  Dx:   Encounter Diagnosis     ICD-10-CM    1  Rupture of right distal biceps tendon, initial encounter  S46 211A Ambulatory referral to Physical Therapy          Start Time: 0700  Stop Time: 0745  Total time in clinic (min): 45 minutes    Assessment  Assessment details: August Loza is a pleasant 50 y o  male who presents s/p R distal biceps tendon repair by Dr Jessica Garner on 23  The patient's greatest concerns are fear of not being able to keep active  No further referral appears necessary at this time based upon examination results  Primary movement impairment diagnosis of R elbow/forearm ROM resulting in pathoanatomical symptoms of Rupture of right distal biceps tendon, initial encounter  (primary encounter diagnosis) and limiting his ability to carry, exercise or recreation, go to work, lift and perform yard work  Impairments include:  1) R elbow/forearm ROM  2) R elbow/forearm strength  3)  strength    Etiologic factors include recent heavy lifting  Discussed risks, benefits, and alternatives to treatment, and answered all patient questions to patient satisfaction    Impairments: abnormal muscle firing, abnormal muscle tone, abnormal or restricted ROM, abnormal movement, impaired physical strength, lacks appropriate home exercise program, pain with function and poor posture   Understanding of Dx/Px/POC: good   Prognosis: good    Goals  Impairment Goals 4-6 weeks  - Decrease pain to <3/10  - Improve elbow AROM to equal to the unaffected upper extremity  - Increase elbow strength to 5/5 throughout  - Increase scapular strength to 5/5 throughout    Functional Goals 6-12 weeks  - Return to Prior Level of Function  - Patient will be independent with HEP  - Patient will be able to return to lifting weights without increased pain/compensation/difficulty   - Patient will be able to lift with proper mechanics       Plan  Plan details: Prognosis above is given PT services 1x/week over the next 3 months and home program adherence  Patient would benefit from: skilled physical therapy  Planned modality interventions: cryotherapy  Planned therapy interventions: abdominal trunk stabilization, behavior modification, body mechanics training, breathing training, flexibility, functional ROM exercises, home exercise program, joint mobilization, manual therapy, massage, Isidro taping, muscle pump exercises, neuromuscular re-education, patient education, postural training, strengthening, stretching, therapeutic activities, therapeutic exercise and therapeutic training  Frequency: 1x week  Duration in weeks: 12  Treatment plan discussed with: patient        Subjective Evaluation    History of Present Illness  Date of surgery: 5/1/2023  Mechanism of injury: surgery  Mechanism of injury: WORK/SCHOOL:  and , 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  He can avoid the lifting as needed at the moment  [place]: girlfriend at home  HOBBIES/EXERCISE: working, home gym with lifting  PLOF: no prior injuries to elbow  HISTORY OF CURRENT INJURY: Patient picked up a car and heard a pop and felt immediate pain in his R elbow and into his shoulder  He knew immediately that something had happened  He let it go for a few days when he went to urgent care who referred him to ortho who then sent him for an MRI which confirmed the distal biceps rupture  He went to see Dr Maria Alejandra Crocker who scheduled surgery on 5/1/23  He underwent distal biceps tendon repair on this day  Patient was in a splint immediately after surgery which was removed yesterday  He is not allowed to lift anything heavy  He had a medication reaction after surgery and was in the hospital for 3 days to clear this     PAIN LOCATION/DESCRIPTORS: anterior forearm, supinator is sore deep pressure AGGRAVATING FACTORS: supination, pushing on the incision, lifting anything over 5 lbs   EASES: gentle stretching, movement  DAY PATTERN: none  IMAGING: MRI: Complete rupture of the biceps tendon with 7 4 cm of proximal tendon retraction from the radial tuberosity  SPECIAL QUESTIONS:    Julita Isabel denies a new onset of Tingling and Numbness    PATIENT GOALS: Patient wishes to get back to lifting weights  Pain  Current pain ratin  At best pain ratin  At worst pain ratin  Quality: dull ache and tight  Progression: improved    Patient Goals  Patient goals for therapy: decreased pain, increased motion, increased strength, independence with ADLs/IADLs and return to sport/leisure activities          Objective     Active Range of Motion     Left Elbow   Flexion: 140 degrees   Extension: 0 degrees   Forearm supination: 90 degrees     Right Elbow   Flexion: 130 degrees   Extension: -20 degrees   Forearm supination: 60 degrees   Forearm pronation: 75 degrees     Passive Range of Motion     Left Elbow   Flexion: 140 degrees   Extension: 0 degrees   Forearm supination: 90 degrees   Forearm pronation: 90 degrees     Right Elbow   Flexion: 135 degrees   Extension: -10 degrees   Forearm supination: 65 degrees   Forearm pronation: 75 degrees     Additional Passive Range of Motion Details  Tissue stretch at end ranges R arm    Strength/Myotome Testing     Left Elbow   Normal strength    Additional Strength Details   strength L: 120 lbs  DNT R due to recent surgery    General Comments:      Elbow Comments   L forearm 4 cm distal to joint 31 2 cm, R forearm 4 cm distal to joint 34 5 cm             Diagnosis: R distal biceps tendon repair 23   Precautions: per protocol   Primary Goals: 1) R elbow/forearm ROM  2) R elbow/forearm strength  3)  strength   *asterisks by exercise = given for HEP   Manuals        PROM elbow and forearm        STM forearm and bicep PRN                                There Ex Elbow flx/ext AROM        Elbow sup/pron AROM        Elbow flx OP                                                        Neuro Re-Ed                                                                                                         Re-evaluation              Ther Act                                         Modalities             Ice

## 2023-05-12 ENCOUNTER — OFFICE VISIT (OUTPATIENT)
Dept: PHYSICAL THERAPY | Facility: CLINIC | Age: 53
End: 2023-05-12

## 2023-05-12 DIAGNOSIS — S46.211A RUPTURE OF RIGHT DISTAL BICEPS TENDON, INITIAL ENCOUNTER: Primary | ICD-10-CM

## 2023-05-12 NOTE — PROGRESS NOTES
Daily Note     Today's date: 2023  Patient name: Sharita Nicholson  : 1970  MRN: [id number]  Referring provider: Kale Jensen DO  Dx:   Encounter Diagnosis     ICD-10-CM    1  Rupture of right distal biceps tendon, initial encounter  S46 211A           Start Time: 746  Stop Time: 816  Total time in clinic (min): 30 minutes    Subjective: Patient states he is doing well  He has been compliant with his HEP  He has no complaints of pain  Objective: See treatment diary below      Assessment: Initiated outlined program  Focused on manuals to improve ROM as patient is compliant with his exercises at home  Patient's ROM improved since his eval yesterday evident of compliance  He has no complaints of pain with stretches in all planes  STM performed around incision with good tolerance, adhesions still significant at this time  Will continue to follow MD protocol to progress patient  He has good understanding of HEP  Plan: Continue per plan of care        Diagnosis: R distal biceps tendon repair 23   Precautions: per protocol   Primary Goals: 1) R elbow/forearm ROM  2) R elbow/forearm strength  3)  strength   *asterisks by exercise = given for HEP   Manuals       PROM elbow and forearm  KK, PTA       STM forearm and bicep PRN  KK, PTA                               There Ex        Elbow flx/ext AROM  HEP      Elbow sup/pron AROM  HEP      Elbow flx OP                                                        Neuro Re-Ed                                                                                                         Re-evaluation              Ther Act                                         Modalities             Ice

## 2023-05-16 NOTE — PROGRESS NOTES
CONFLICTING DIAGNOSIS RESPONSE NOTE    Based on the query that was sent to you regarding Conflicting Diagnosis, please document in this note from the options below if you agree or disagree with the consulting physician:      Disagree with diagnosis

## 2023-05-17 ENCOUNTER — APPOINTMENT (OUTPATIENT)
Dept: PHYSICAL THERAPY | Facility: CLINIC | Age: 53
End: 2023-05-17
Payer: COMMERCIAL

## 2023-05-18 ENCOUNTER — OFFICE VISIT (OUTPATIENT)
Dept: PHYSICAL THERAPY | Facility: CLINIC | Age: 53
End: 2023-05-18

## 2023-05-18 DIAGNOSIS — S46.211A RUPTURE OF RIGHT DISTAL BICEPS TENDON, INITIAL ENCOUNTER: Primary | ICD-10-CM

## 2023-05-18 NOTE — PROGRESS NOTES
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 which his arm is bent so he tries to straighten it as often as he can throughout the day  He did states he is using more than he should but has been good about not lifting  Objective: See treatment diary below      Assessment: Continued with outlined program  Patient's ROM has progressed well since surgery and has minimal to no limitations at this time evident of HEP  Patient does have excessive scar tissue at incision site, but this is slowly improving with scar massage as well  He is making great progress towards goals  Plan: Progress treatment as tolerated         Diagnosis: R distal biceps tendon repair 23   Precautions: per protocol   Primary Goals: 1) R elbow/forearm ROM  2) R elbow/forearm strength  3)  strength   *asterisks by exercise = given for HEP   Manuals      PROM elbow and forearm  KK, PTA  KK, PTA      STM forearm and bicep PRN  KK, PTA  KK, PTA                              There Ex        Pulleys   10x10 sec      Elbow flx/ext AROM  HEP      Elbow sup/pron AROM  HEP      Elbow flx OP                                                        Neuro Re-Ed                                                                                                         Re-evaluation              Ther Act                                         Modalities             Ice

## 2023-05-24 ENCOUNTER — APPOINTMENT (OUTPATIENT)
Dept: PHYSICAL THERAPY | Facility: CLINIC | Age: 53
End: 2023-05-24
Payer: COMMERCIAL

## 2023-05-26 ENCOUNTER — OFFICE VISIT (OUTPATIENT)
Dept: PHYSICAL THERAPY | Facility: CLINIC | Age: 53
End: 2023-05-26

## 2023-05-26 DIAGNOSIS — S46.211A RUPTURE OF RIGHT DISTAL BICEPS TENDON, INITIAL ENCOUNTER: Primary | ICD-10-CM

## 2023-05-26 NOTE — PROGRESS NOTES
Daily Note     Today's date: 2023  Patient name: Chelsey Costa  : 1970  MRN: [id number]  Referring provider: Eller Mcardle, DO  Dx:   Encounter Diagnosis     ICD-10-CM    1  Rupture of right distal biceps tendon, initial encounter  S46 211A           Start Time: 0700  Stop Time: 07  Total time in clinic (min): 42 minutes    Subjective: Patient states he is doing well  He did use his R arm to help lift something but had no difficulty or pain following  He states he has fatigue when eating and bringing his fork to his mouth  He complains of numbness and tingling from his forearm to his thumb intermittently  Objective: See treatment diary below      Assessment: Continued with outlined program  Patient is compliant with HEP  Focused on manuals while in PT to achieve full ROM  Educated patient on the skin nerve near the surgical site that is commonly irritated called the lateral antebrachial cutaneous nerve  He does respond well to motion with pressure at forearm; nerve symptoms resolve following session  Will continue to assess and progress  Plan: Progress treatment as tolerated         Diagnosis: R distal biceps tendon repair 23   Precautions: per protocol   Primary Goals: 1) R elbow/forearm ROM  2) R elbow/forearm strength  3)  strength   *asterisks by exercise = given for HEP   Manuals     PROM elbow and forearm  KK, PTA  KK, PTA  KK, PTA     STM forearm and bicep PRN  KK, PTA  KK, PTA  KK, PTA                             There Ex        Pulleys   10x10 sec      Elbow flx/ext AROM  HEP      Elbow sup/pron AROM  HEP      Elbow flx OP                                                        Neuro Re-Ed                                                                                                         Re-evaluation              Ther Act                                         Modalities             Ice

## 2023-05-31 ENCOUNTER — OFFICE VISIT (OUTPATIENT)
Dept: PHYSICAL THERAPY | Facility: CLINIC | Age: 53
End: 2023-05-31

## 2023-05-31 DIAGNOSIS — S46.211A RUPTURE OF RIGHT DISTAL BICEPS TENDON, INITIAL ENCOUNTER: Primary | ICD-10-CM

## 2023-05-31 NOTE — PROGRESS NOTES
Daily Note     Today's date: 2023  Patient name: Gali Dodson  : 1970  MRN: [id number]  Referring provider: Amalia Moncada DO  Dx:   Encounter Diagnosis     ICD-10-CM    1  Rupture of right distal biceps tendon, initial encounter  S46 211A           Start Time: 659  Stop Time: 07  Total time in clinic (min): 38 minutes    Subjective: Patient states his arm is sore today  He is not sure if he slept on it wrong but he has not been lifting anything  He continues to complain of nerve pain into his thumb  He is compliant with stretching at home  Objective: See treatment diary below      Assessment: Continued with outlined program  Patient has no ROM limitations at this time, however he does complain of LABC pain with any OP into pronation  Patient does have this at rest intermittently throughout his day as well  IASTM had greatly reduced adhesions at this time and improved elbow flexion without limitations  He was able to perform shoulder AROM without difficulty  He is making steady progress towards goals  Re-eval next visit  FOTO score improved  Plan: Progress treatment as tolerated         Diagnosis: R distal biceps tendon repair 23   Precautions: per protocol   Primary Goals: 1) R elbow/forearm ROM  2) R elbow/forearm strength  3)  strength   *asterisks by exercise = given for HEP   Manuals    PROM elbow and forearm  KK, PTA  KK, PTA  KK, PTA  KK, PTA    STM forearm and bicep PRN  KK, PTA  KK, PTA  KK, PTA  KK, PTA                            There Ex        Pulleys   10x10 sec      Elbow flx/ext AROM  HEP   20x    Elbow sup/pron AROM  HEP   20x    Elbow flx OP     20x                                                   Neuro Re-Ed        Prone ITY     2x10 ea with scap retraction                                                                                             Re-evaluation              Ther Act                                         Modalities Ice

## 2023-06-01 ENCOUNTER — APPOINTMENT (OUTPATIENT)
Dept: PHYSICAL THERAPY | Facility: CLINIC | Age: 53
End: 2023-06-01
Payer: COMMERCIAL

## 2023-06-06 ENCOUNTER — EVALUATION (OUTPATIENT)
Dept: PHYSICAL THERAPY | Facility: CLINIC | Age: 53
End: 2023-06-06
Payer: COMMERCIAL

## 2023-06-06 DIAGNOSIS — S46.211A RUPTURE OF RIGHT DISTAL BICEPS TENDON, INITIAL ENCOUNTER: Primary | ICD-10-CM

## 2023-06-06 PROCEDURE — 97112 NEUROMUSCULAR REEDUCATION: CPT | Performed by: PHYSICAL THERAPIST

## 2023-06-06 NOTE — PROGRESS NOTES
PT Re-Evaluation     Today's date: 2023  Patient name: Linda Littlejohn  : 1970  MRN: [id number]  Referring provider: Randall Scheuermann, DO  Dx:   Encounter Diagnosis     ICD-10-CM    1  Rupture of right distal biceps tendon, initial encounter  S46 211A PT plan of care cert/re-cert          Start Time: 0745  Stop Time: 7921  Total time in clinic (min): 38 minutes    Assessment  Assessment details: Linda Littlejohn is a pleasant 50 y o  male who presents s/p R distal biceps tendon repair by Dr Paul Miranda on 23  He his progressing very well at this time, with near full ROM and minimal limitations in strength  Patient is returning to MD next week to discuss return to recreational lifting and beginning strengthening in PT  Patient would benefit form continued PT per POC  Primary movement impairment diagnosis of R elbow/forearm strength resulting in pathoanatomical symptoms of Rupture of right distal biceps tendon, initial encounter (primary encounter diagnosis) and limiting his ability to carry, exercise or recreation, go to work, lift and perform yard work  Impairments include:  1) R elbow/forearm ROM  2) R elbow/forearm strength  3)  strength    Etiologic factors include recent heavy lifting  Discussed risks, benefits, and alternatives to treatment, and answered all patient questions to patient satisfaction    Impairments: abnormal muscle firing, abnormal muscle tone, abnormal or restricted ROM, abnormal movement, impaired physical strength, lacks appropriate home exercise program, pain with function and poor posture   Understanding of Dx/Px/POC: good   Prognosis: good    Goals  Impairment Goals 4-6 weeks  - Decrease pain to <3/10 - partially met  - Improve elbow AROM to equal to the unaffected upper extremity - met  - Increase elbow strength to 5/5 throughout - partially met  - Increase scapular strength to 5/5 throughout - partially met    Functional Goals 6-12 weeks  - Return to Prior Level of Function - partially met  - Patient will be independent with HEP - met  - Patient will be able to return to lifting weights without increased pain/compensation/difficulty - partially met  - Patient will be able to lift with proper mechanics - partially met      Plan  Plan details: Prognosis above is given PT services 1x/week over the next 2 months and home program adherence  Patient would benefit from: skilled physical therapy  Planned modality interventions: cryotherapy  Planned therapy interventions: abdominal trunk stabilization, behavior modification, body mechanics training, breathing training, flexibility, functional ROM exercises, home exercise program, joint mobilization, manual therapy, massage, Isidro taping, muscle pump exercises, neuromuscular re-education, patient education, postural training, strengthening, stretching, therapeutic activities, therapeutic exercise and therapeutic training  Frequency: 1x week  Duration in weeks: 8  Treatment plan discussed with: patient        Subjective Evaluation    History of Present Illness  Date of surgery: 5/1/2023  Mechanism of injury: surgery  Mechanism of injury: WORK/SCHOOL:  and , 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  He can avoid the lifting as needed at the moment  [place]: girlfriend at home  HOBBIES/EXERCISE: working, home gym with lifting  PLOF: no prior injuries to elbow  HISTORY OF CURRENT INJURY: Patient picked up a car and heard a pop and felt immediate pain in his R elbow and into his shoulder  He knew immediately that something had happened  He let it go for a few days when he went to urgent care who referred him to ortho who then sent him for an MRI which confirmed the distal biceps rupture  He went to see Dr Paul Miranda who scheduled surgery on 5/1/23  He underwent distal biceps tendon repair on this day  Patient was in a splint immediately after surgery which was removed yesterday   He is not allowed to lift anything heavy  He had a medication reaction after surgery and was in the hospital for 3 days to clear this  Update: Patient rates himself at 90% improvement  He is doing everything he usually does  He is working full time  He is lifting as well though he knows he shouldn't  He has lifted a 60 lb battery out of a golf cart without an issue  He does have nerve pain which stops him from doing things  The nerve pain is in the path of the radial nerve and he feels it mostly in the elbow and anterior forearm  Pain goes down into his thumb as well  PAIN LOCATION/DESCRIPTORS: No pain in incision or bicep, does have nerve pain in his forearm  Described as sharp and shooting pain  AGGRAVATING FACTORS: Lifting weights  Improved: supination, pushing on the incision, lifting anything over 5 lbs   EASES: gentle stretching, movement  DAY PATTERN: none  IMAGING: MRI: Complete rupture of the biceps tendon with 7 4 cm of proximal tendon retraction from the radial tuberosity  SPECIAL QUESTIONS:    Yesika Rhenrique denies a new onset of Tingling and Numbness    PATIENT GOALS: Patient wishes to get back to lifting weights - 90% improved, has not attempted weights yet due to MD orders  Pain  Current pain ratin  At best pain ratin  At worst pain ratin  Location: R arm  Quality: sharp and radiating  Progression: improved    Patient Goals  Patient goals for therapy: decreased pain, increased motion, increased strength, independence with ADLs/IADLs and return to sport/leisure activities          Objective     Active Range of Motion     Left Elbow   Flexion: 140 degrees   Extension: 0 degrees   Forearm supination: 90 degrees     Right Elbow   Flexion: 135 degrees   Extension: 0 degrees   Forearm supination: 75 degrees   Forearm pronation: 85 degrees     Passive Range of Motion     Left Elbow   Flexion: 140 degrees   Extension: 0 degrees   Forearm supination: 90 degrees   Forearm pronation: 90 degrees     Right Elbow Flexion: 140 degrees   Extension: 0 degrees   Forearm supination: 80 degrees   Forearm pronation: 85 degrees     Additional Passive Range of Motion Details  Tissue stretch at end ranges R arm    Strength/Myotome Testing     Left Elbow   Normal strength  Flexion: 5  Extension: 5  Forearm supination: 5  Forearm pronation: 5    Right Elbow   Flexion: 4+  Extension: 4+  Forearm supination: 4+  Forearm pronation: 4+    Additional Strength Details   strength L: 120 lbs, R: 100 lbs no pain      General Comments:      Elbow Comments   L forearm 4 cm distal to joint 32 cm, R forearm 4 cm distal to joint 34 5 cm  Scar: adhesions beneath, elevated, healing well              Diagnosis: R distal biceps tendon repair 5/1/23   Precautions: per protocol   Primary Goals: 1) R elbow/forearm ROM  2) R elbow/forearm strength  3)  strength   *asterisks by exercise = given for HEP   Manuals 6/6 5/12 5/18 5/26 5/31   PROM elbow and forearm  KK, PTA  KK, PTA  KK, PTA  KK, PTA    STM forearm and bicep PRN  KK, PTA  KK, PTA  KK, PTA  KK, PTA    Scar massage Once cleared by surgeon                       There Ex        Pulleys   10x10 sec      Elbow flx/ext AROM  HEP   20x    Elbow sup/pron AROM  HEP   20x    Elbow flx OP HEP    20x   Wrist pron/sup S HEP       's tip nerve glide HEP                                       Neuro Re-Ed        Prone ITY     2x10 ea with scap retraction                                                                                             Re-evaluation  IM, PT            Ther Act                                         Modalities             Ice

## 2023-06-07 DIAGNOSIS — N52.9 ERECTILE DYSFUNCTION, UNSPECIFIED ERECTILE DYSFUNCTION TYPE: ICD-10-CM

## 2023-06-07 RX ORDER — TADALAFIL 5 MG/1
5 TABLET ORAL DAILY
Qty: 30 TABLET | Refills: 0 | Status: SHIPPED | OUTPATIENT
Start: 2023-06-07

## 2023-06-08 ENCOUNTER — APPOINTMENT (OUTPATIENT)
Dept: PHYSICAL THERAPY | Facility: CLINIC | Age: 53
End: 2023-06-08
Payer: COMMERCIAL

## 2023-06-12 ENCOUNTER — APPOINTMENT (OUTPATIENT)
Dept: PHYSICAL THERAPY | Facility: CLINIC | Age: 53
End: 2023-06-12
Payer: COMMERCIAL

## 2023-06-13 ENCOUNTER — OFFICE VISIT (OUTPATIENT)
Dept: OBGYN CLINIC | Facility: MEDICAL CENTER | Age: 53
End: 2023-06-13

## 2023-06-13 VITALS
BODY MASS INDEX: 35.52 KG/M2 | WEIGHT: 268 LBS | SYSTOLIC BLOOD PRESSURE: 130 MMHG | HEIGHT: 73 IN | HEART RATE: 93 BPM | DIASTOLIC BLOOD PRESSURE: 92 MMHG

## 2023-06-13 DIAGNOSIS — Z98.890 STATUS POST TENDON REPAIR: Primary | ICD-10-CM

## 2023-06-13 DIAGNOSIS — S46.211S RUPTURE OF RIGHT DISTAL BICEPS TENDON, SEQUELA: ICD-10-CM

## 2023-06-13 PROCEDURE — 99024 POSTOP FOLLOW-UP VISIT: CPT | Performed by: ORTHOPAEDIC SURGERY

## 2023-06-13 NOTE — PROGRESS NOTES
[facility] [place] [address] [place] [id number]       Emre Lee  [id number]  1970    ORTHOPAEDIC SURGERY OUTPATIENT NOTE  6/13/2023      HISTORY:  50 y o  male 6 weeks s/p right distal bicep tendon repair  DOS 5/1/2023  Patient over all states he is doing well, rates his pain 0/10  SANE score 95%  Patient had an allergic reaction to the chlorhexidine post operatively which has completely resolved  Past Medical History:   Diagnosis Date   • Anxiety    • GERD (gastroesophageal reflux disease)    • OCD (obsessive compulsive disorder)    • Sleep apnea     USES CPAP HS   • Umbilical hernia     last assessed 08/30/17       Past Surgical History:   Procedure Laterality Date   • ARTHROSCOPY KNEE Left    • BICEPS TENDON REPAIR Right 5/1/2023    Procedure: REPAIR TENDON BICEPS- right and all necessary procedures;  Surgeon: Gisel Roach;  Location:  MAIN OR;  Service: Orthopedics   • HERNIA REPAIR  2017   • KNEE SURGERY  6063   • OK RPR UMBILICAL HRNA 5 YRS/> REDUCIBLE N/A 10/27/2017    Procedure: REPAIR HERNIA UMBILICAL with mesh;  Surgeon: Katt Ly MD;  Location:  MAIN OR;  Service: General       Social History     Socioeconomic History   • Marital status:      Spouse name: Not on file   • Number of children: Not on file   • Years of education: Not on file   • Highest education level: Not on file   Occupational History   • Not on file   Tobacco Use   • Smoking status: Never   • Smokeless tobacco: Former     Types: Snuff     Quit date: 8/10/2020   Vaping Use   • Vaping Use: Never used   Substance and Sexual Activity   • Alcohol use:  Yes     Alcohol/week: 5 0 standard drinks of alcohol     Types: 5 Cans of beer per week   • Drug use: No   • Sexual activity: Yes     Partners: Female   Other Topics Concern   • Not on file   Social History Narrative    Caffeine use     Social Determinants of Health     Financial Resource "Strain: Not on file   Food Insecurity: Not on file   Transportation Needs: Not on file   Physical Activity: Not on file   Stress: Not on file   Social Connections: Not on file   Intimate Partner Violence: Not on file   Housing Stability: Not on file       Family History   Problem Relation Age of Onset   • No Known Problems Mother    • Other Father         adopted by father   • Diabetes Son    • Diabetes Son         Patient's Medications   New Prescriptions    No medications on file   Previous Medications    ACETAMINOPHEN (TYLENOL) 500 MG TABLET    Take 1,000 mg by mouth every 6 (six) hours as needed for mild pain    ESCITALOPRAM (LEXAPRO) 10 MG TABLET    TAKE 1 TABLET BY MOUTH EVERY DAY    INDOMETHACIN (INDOCIN SR) 75 MG CR CAPSULE    Take 1 capsule (75 mg total) by mouth daily with breakfast for 14 days    OMEPRAZOLE (PRILOSEC) 40 MG CAPSULE    TAKE 1 CAPSULE BY MOUTH EVERY DAY    OXYCODONE (ROXICODONE) 5 IMMEDIATE RELEASE TABLET    Take 1 tablet (5 mg total) by mouth every 6 (six) hours as needed for moderate pain Max Daily Amount: 20 mg    TADALAFIL (CIALIS) 5 MG TABLET    Take 1 tablet (5 mg total) by mouth daily   Modified Medications    No medications on file   Discontinued Medications    No medications on file       Allergies   Allergen Reactions   • Penicillin V Hives   • Clindamycin Rash     Whole body rash, no airway symptoms   • Chlorhexidine Gluconate Rash     Allergic to CHG clothes, no PCN/Clindamycin        /92   Pulse 93   Ht 6' 1\" (1 854 m)   Wt 122 kg (268 lb)   BMI 35 36 kg/m²      REVIEW OF SYSTEMS:  Constitutional: Negative  HEENT: Negative  Respiratory: Negative  Skin: Negative  Neurological: Negative  Psychiatric/Behavioral: Negative  Musculoskeletal: Negative except for that mentioned in the HPI        PHYSICAL EXAM:   Right Elbow   Well healed incisions  Scar tissue noted   Full range of motion  5/5 strength  No pain with range of motion       IMAGING: no new images " obtained today     ASSESSMENT AND PLAN:  50 y o  male weeks status post right bicep tendon repair  Patient overall is doing very well  Updated referral to physical therapy to begin strengthening and scar tissue massage provided  Activities as tolerated  Follow-up in 6 weeks repeat evaluation at that time      Scribe Attestation    I,:  Rehan Weiner am acting as a scribe while in the presence of the attending physician :       I,:  Godfrey Merritt personally performed the services described in this documentation    as scribed in my presence :

## 2023-06-14 ENCOUNTER — OFFICE VISIT (OUTPATIENT)
Dept: PHYSICAL THERAPY | Facility: CLINIC | Age: 53
End: 2023-06-14
Payer: COMMERCIAL

## 2023-06-14 DIAGNOSIS — S46.211A RUPTURE OF RIGHT DISTAL BICEPS TENDON, INITIAL ENCOUNTER: Primary | ICD-10-CM

## 2023-06-14 PROCEDURE — 97112 NEUROMUSCULAR REEDUCATION: CPT

## 2023-06-14 PROCEDURE — 97140 MANUAL THERAPY 1/> REGIONS: CPT

## 2023-06-14 NOTE — PROGRESS NOTES
Daily Note     Today's date: 2023  Patient name: Rupali Aburto  : 1970  MRN: [id number]  Referring provider: Diana Horn DO  Dx:   Encounter Diagnosis     ICD-10-CM    1  Rupture of right distal biceps tendon, initial encounter  S46 211A           Start Time: 733  Stop Time: 815  Total time in clinic (min): 42 minutes    Subjective: Patient states he has been doing well  He has no new complaints to offer  He states the nerve pain in his R arm is slowly subsiding  He has more nerve pain in the L which he addressed with Dr Bradford Richardson  Objective: See treatment diary below      Assessment: Continued with outlined program  Patient responded well to scar massage with IASTM; erythema following  He was able to begin strengthening exercises as noted with cues for pacing and eccentric focus  He has noticeable muscle fatigue with all exercises  He is making steady progress towards goals  Plan: Progress treatment as tolerated         Diagnosis: R distal biceps tendon repair 23   Precautions: per protocol   Primary Goals: 1) R elbow/forearm ROM  2) R elbow/forearm strength  3)  strength   *asterisks by exercise = given for HEP   Manuals    PROM elbow and forearm   KK, PTA  KK, PTA  KK, PTA    STM forearm and bicep PRN   KK, PTA  KK, PTA  KK, PTA    Scar massage Once cleared by surgeon IASYOSI KK, PTA                       There Ex        UBE  2'/2'      Pulleys   10x10 sec      Elbow flx/ext AROM     20x    Elbow sup/pron AROM     20x    Elbow flx OP HEP    20x   Wrist pron/sup S HEP       's tip nerve glide HEP                                       Neuro Re-Ed        Prone ITY     2x10 ea with scap retraction    Bicep curl  10# 3x10       Hammer curl   10# 3x10       Wrist extension   5# 3x10       Pro/sup flex  Green 2x10/Blue 1x10 pro  Blue 3x10 sup      Wrist flex/ext  Blue 3x10       Akash h-abd   RTB 3x10      Akash ER   RTB 3x10       Ayden rows/ext  Rows:30# 3x10  Ext:30# 3x10                                Re-evaluation  IM, PT            Ther Act                                         Modalities             Ice

## 2023-06-19 ENCOUNTER — OFFICE VISIT (OUTPATIENT)
Dept: PHYSICAL THERAPY | Facility: CLINIC | Age: 53
End: 2023-06-19
Payer: COMMERCIAL

## 2023-06-19 DIAGNOSIS — S46.211A RUPTURE OF RIGHT DISTAL BICEPS TENDON, INITIAL ENCOUNTER: Primary | ICD-10-CM

## 2023-06-19 PROCEDURE — 97112 NEUROMUSCULAR REEDUCATION: CPT

## 2023-06-19 PROCEDURE — 97140 MANUAL THERAPY 1/> REGIONS: CPT

## 2023-06-19 NOTE — PROGRESS NOTES
Daily Note     Today's date: 2023  Patient name: Nell Feng  : 1970  MRN: [id number]  Referring provider: Aiden Powers DO  Dx:   Encounter Diagnosis     ICD-10-CM    1  Rupture of right distal biceps tendon, initial encounter  S46 211A           Start Time: 0700  Stop Time: 0744  Total time in clinic (min): 44 minutes    Subjective: Patient states he is doing well  He has some nerve pain in his R arm now and then, but not as much as he did following surgery  Objective: See treatment diary below      Assessment: Continued with outlined program  Patient is 7 weeks at this time  Patient tolerated light resistive exercises well with small increases this visit  He has noticeable muscle fatigue with muscle isolation as noted  Patient has no exacerbating pain symptoms throughout session  He exhibits proper scap retraction with prone ITY  He is making steady progress towards goals  Plan: Progress treatment as tolerated         Diagnosis: R distal biceps tendon repair 23   Precautions: per protocol   Primary Goals: 1) R elbow/forearm ROM  2) R elbow/forearm strength  3)  strength   *asterisks by exercise = given for HEP   Manuals  /   PROM elbow and forearm    KK, PTA  KK, PTA    STM forearm and bicep PRN    KK, PTA  KK, PTA    Scar massage Once cleared by surgeon IASTM KK, PTA  KK, PTA IASTM                      There Ex        UBE  2'/2' 2'/2' level 4 0      Pulleys        Elbow flx/ext AROM     20x    Elbow sup/pron AROM     20x    Elbow flx OP HEP    20x   Wrist pron/sup S HEP       's tip nerve glide HEP                                       Neuro Re-Ed        Prone ITY   TY3# 3x10 ea with scap retraction   I 8# 3x10   2x10 ea with scap retraction    Bicep curl  10# 3x10  15# 3x10      Hammer curl   10# 3x10  15# 3x10      Wrist extension   5# 3x10  8# 3x10      Pro/sup flex  Green 2x10/Blue 1x10 pro  Blue 3x10 sup Blue 3x10      Wrist flex/ext  Blue 3x10 Blue 3x10      Akash h-abd   RTB 3x10 GTB 3x10      Akash ER   RTB 3x10  GTB 3x10      Ayden rows/ext  Rows:30# 3x10  Ext:30# 3x10  Rows:40# 3x10   Ext:35# 3x10     Pro/sup   Hammer with 4# 3x10                       Re-evaluation  IM, PT           Ther Act                                      Modalities            Ice

## 2023-06-21 ENCOUNTER — APPOINTMENT (OUTPATIENT)
Dept: PHYSICAL THERAPY | Facility: CLINIC | Age: 53
End: 2023-06-21
Payer: COMMERCIAL

## 2023-06-26 ENCOUNTER — OFFICE VISIT (OUTPATIENT)
Dept: PHYSICAL THERAPY | Facility: CLINIC | Age: 53
End: 2023-06-26
Payer: COMMERCIAL

## 2023-06-26 DIAGNOSIS — S46.211A RUPTURE OF RIGHT DISTAL BICEPS TENDON, INITIAL ENCOUNTER: Primary | ICD-10-CM

## 2023-06-26 PROCEDURE — 97112 NEUROMUSCULAR REEDUCATION: CPT

## 2023-06-26 PROCEDURE — 97140 MANUAL THERAPY 1/> REGIONS: CPT

## 2023-06-26 NOTE — PROGRESS NOTES
Daily Note     Today's date: 2023  Patient name: Niki Bragg  : 1970  MRN: [id number]  Referring provider: Tiffanie Simental DO  Dx:   Encounter Diagnosis     ICD-10-CM    1  Rupture of right distal biceps tendon, initial encounter  S46 211A           Start Time: 0700  Stop Time: 07  Total time in clinic (min): 41 minutes    Subjective: Patient states he is feeling much better  He has no complaints of pain  He states his nerve symptoms have resolved  Patient notices his scar is not as prominent or tight  Objective: See treatment diary below      Assessment: Continued with outlined program  Patient is able to progress with strengthening exercises at this time slowly increasing weight to tolerance  Patient tolerated progressions well as noted below  He has noticeable muscle fatigue but no signs of distress  He is slowly progressing towards goals  Plan: Progress treatment as tolerated         Diagnosis: R distal biceps tendon repair 23   Precautions: per protocol   Primary Goals: 1) R elbow/forearm ROM  2) R elbow/forearm strength  3)  strength   *asterisks by exercise = given for HEP   Manuals    PROM elbow and forearm     KK, PTA    STM forearm and bicep PRN     KK, PTA    Scar massage Once cleared by surgeon IASTM KK, PTA  KK, PTA IASTM  KK, PTA IASTM                    There Ex        UBE  2'/2' 2'/2' level 4 0  2'/2' level 4 0     Pulleys        Elbow flx/ext AROM     20x    Elbow sup/pron AROM     20x    Elbow flx OP HEP    20x   Wrist pron/sup S HEP       's tip nerve glide HEP                                       Neuro Re-Ed        Prone ITY   TY3# 3x10 ea with scap retraction   I 8# 3x10  TY 5# 3x10 ea   I 10# 3x10 with scap retraction  2x10 ea with scap retraction    Bicep curl  10# 3x10  15# 3x10  20# 3x10     Hammer curl   10# 3x10  15# 3x10  20# 3x10     Wrist extension   5# 3x10  8# 3x10  10# 3x10     Pro/sup flex  Green 2x10/Blue 1x10 pro  Blue 3x10 sup Blue 3x10  Blue 3x10     Wrist flex/ext  Blue 3x10  Blue 3x10  Blue 3x10     Akash h-abd   RTB 3x10 GTB 3x10  BTB 3x10     Akash ER   RTB 3x10  GTB 3x10  BTB 3x10     Ayden rows/ext  Rows:30# 3x10  Ext:30# 3x10  Rows:40# 3x10   Ext:35# 3x10 Rows: 50# 3x10  Ext:35# 3x10     Pro/sup   Hammer with 4# 3x10                       Re-evaluation  IM, PT          Ther Act                                   Modalities           Ice

## 2023-06-28 ENCOUNTER — APPOINTMENT (OUTPATIENT)
Dept: PHYSICAL THERAPY | Facility: CLINIC | Age: 53
End: 2023-06-28
Payer: COMMERCIAL

## 2023-07-05 ENCOUNTER — APPOINTMENT (OUTPATIENT)
Dept: PHYSICAL THERAPY | Facility: CLINIC | Age: 53
End: 2023-07-05
Payer: COMMERCIAL

## 2023-07-05 DIAGNOSIS — N52.9 ERECTILE DYSFUNCTION, UNSPECIFIED ERECTILE DYSFUNCTION TYPE: ICD-10-CM

## 2023-07-05 RX ORDER — TADALAFIL 5 MG/1
5 TABLET ORAL DAILY
Qty: 30 TABLET | Refills: 0 | Status: SHIPPED | OUTPATIENT
Start: 2023-07-05

## 2023-07-06 ENCOUNTER — EVALUATION (OUTPATIENT)
Dept: PHYSICAL THERAPY | Facility: CLINIC | Age: 53
End: 2023-07-06
Payer: COMMERCIAL

## 2023-07-06 DIAGNOSIS — S46.211A RUPTURE OF RIGHT DISTAL BICEPS TENDON, INITIAL ENCOUNTER: Primary | ICD-10-CM

## 2023-07-06 PROCEDURE — 97112 NEUROMUSCULAR REEDUCATION: CPT | Performed by: PHYSICAL THERAPIST

## 2023-07-06 PROCEDURE — 97110 THERAPEUTIC EXERCISES: CPT | Performed by: PHYSICAL THERAPIST

## 2023-07-06 NOTE — PROGRESS NOTES
PT Discharge    Today's date: 2023  Patient name: Callie José  : 1970  MRN: [id number]  Referring provider: Jose Donald DO  Dx:   Encounter Diagnosis     ICD-10-CM    1. Rupture of right distal biceps tendon, initial encounter  S46.211A           Start Time: 0700  Stop Time: 0745  Total time in clinic (min): 45 minutes    Assessment  Assessment details: Callie José has been compliant with attending physical therapy and completing home exercise program since initial evaluation. Alida Cespedes  has made improvements in objective data since initial evalulation and has achieved all goals. Patient reports having returned to their prior level of function. Patient provided with updated Home Exercise Program, all questions answered, and verbalized understanding, agreeing to plan of care. Thus it was mutually decided to discontinue this episode of care and transition to Home Exercise Program.     Understanding of Dx/Px/POC: good   Prognosis: good    Goals  Impairment Goals 4-6 weeks  - Decrease pain to <3/10 - met  - Improve elbow AROM to equal to the unaffected upper extremity - met  - Increase elbow strength to 5/5 throughout - met  - Increase scapular strength to 5/5 throughout - met    Functional Goals 6-12 weeks  - Return to Prior Level of Function - met  - Patient will be independent with HEP - met  - Patient will be able to return to lifting weights without increased pain/compensation/difficulty - met  - Patient will be able to lift with proper mechanics - met      Plan  Planned therapy interventions: home exercise program  Treatment plan discussed with: patient        Subjective Evaluation    History of Present Illness  Date of surgery: 2023  Mechanism of injury: surgery  Mechanism of injury: WORK/SCHOOL:  and , 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  He can avoid the lifting as needed at the moment  HOME LIFE: girlfriend at home  HOBBIES/EXERCISE: working, home gym with lifting  PLOF: no prior injuries to elbow  HISTORY OF CURRENT INJURY: Patient picked up a car and heard a pop and felt immediate pain in his R elbow and into his shoulder. He knew immediately that something had happened. He let it go for a few days when he went to urgent care who referred him to ortho who then sent him for an MRI which confirmed the distal biceps rupture. He went to see Dr. Suzie Dancer who scheduled surgery on 23. He underwent distal biceps tendon repair on this day. Patient was in a splint immediately after surgery which was removed yesterday. He is not allowed to lift anything heavy. He had a medication reaction after surgery and was in the hospital for 3 days to clear this. Update: Patient rates himself at 100% improvement. He is doing everything he usually does. He has no pain anymore. The nerve pain has also gone away. Patient is cleared   PAIN LOCATION/DESCRIPTORS: No pain in incision or bicep, no nerve pain remains. AGGRAVATING FACTORS: none  Improved: supination, pushing on the incision, lifting anything over 5 lbs, lifting weights  EASES: gentle stretching, movement  DAY PATTERN: none  IMAGING: MRI: Complete rupture of the biceps tendon with 7.4 cm of proximal tendon retraction from the radial tuberosity. SPECIAL QUESTIONS:    Lara Quezada denies a new onset of Tingling and Numbness.   PATIENT GOALS: Patient wishes to get back to lifting weights - 100% improved, will start at home now  Pain  Current pain ratin  At best pain ratin  At worst pain ratin  Progression: improved    Patient Goals  Patient goals for therapy: decreased pain, increased motion, increased strength, independence with ADLs/IADLs and return to sport/leisure activities          Objective     Active Range of Motion     Left Elbow   Flexion: 140 degrees   Extension: 0 degrees   Forearm supination: 90 degrees     Right Elbow   Flexion: 140 degrees   Extension: 0 degrees   Forearm supination: 80 degrees Forearm pronation: 90 degrees     Passive Range of Motion     Left Elbow   Flexion: 140 degrees   Extension: 0 degrees   Forearm supination: 90 degrees   Forearm pronation: 90 degrees     Right Elbow   Flexion: 140 degrees   Extension: 0 degrees   Forearm supination: 80 degrees   Forearm pronation: 85 degrees     Strength/Myotome Testing     Left Elbow   Normal strength  Flexion: 5  Extension: 5  Forearm supination: 5  Forearm pronation: 5    Right Elbow   Flexion: 5  Extension: 5  Forearm supination: 5  Forearm pronation: 5    Additional Strength Details   strength L: 120 lbs, R: 130 lbs no pain      General Comments:      Elbow Comments   L forearm 4 cm distal to joint 32 cm, R forearm 4 cm distal to joint 34 cm  Scar: adhesions improved, some till present under scar              Diagnosis: R distal biceps tendon repair 5/1/23   Precautions: per protocol   Primary Goals: 1) R elbow/forearm ROM  2) R elbow/forearm strength  3)  strength   *asterisks by exercise = given for HEP   Manuals 6/6 6/14 6/19 6/26 7/6   PROM elbow and forearm        STM forearm and bicep PRN        Scar massage Once cleared by surgeon IASYOSI KK, PTA  KK, PTA IASYOSI  KK, PTA IASTM                    There Ex        UBE  2'/2' 2'/2' level 4.0  2'/2' level 4.0  2'/2' level 4.0    Pulleys        Elbow flx/ext AROM        Elbow sup/pron AROM        Elbow flx OP HEP       Wrist pron/sup S HEP       's tip nerve glide HEP                                       Neuro Re-Ed        Prone ITY   TY3# 3x10 ea with scap retraction   I 8# 3x10  TY 5# 3x10 ea   I 10# 3x10 with scap retraction     Bicep curl  10# 3x10  15# 3x10  20# 3x10  20# 3x10    Hammer curl   10# 3x10  15# 3x10  20# 3x10  20# 3x10    Wrist extension   5# 3x10  8# 3x10  10# 3x10  10# 3x10   Pro/sup flex  Green 2x10/Blue 1x10 pro  Blue 3x10 sup Blue 3x10  Blue 3x10  Blue 3x10   Wrist flex/ext  Blue 3x10  Blue 3x10  Blue 3x10     Akash h-abd   RTB 3x10 GTB 3x10  BTB 3x10  Black TB 3x10   Akash ER   RTB 3x10  GTB 3x10  BTB 3x10  NP, pain in L elbow   [place] rows/ext  Rows:30# 3x10  Ext:30# 3x10  Rows:40# 3x10   Ext:35# 3x10 Rows: 50# 3x10  Ext:35# 3x10  Rows: 50# 3x10  Ext:35# 3x10    Pro/sup   Hammer with 4# 3x10                       Re-evaluation  IM, PT      IM, PT    Ther Act                                   Modalities           Ice

## 2023-07-07 ENCOUNTER — APPOINTMENT (OUTPATIENT)
Dept: PHYSICAL THERAPY | Facility: CLINIC | Age: 53
End: 2023-07-07
Payer: COMMERCIAL

## 2023-07-10 ENCOUNTER — APPOINTMENT (OUTPATIENT)
Dept: PHYSICAL THERAPY | Facility: CLINIC | Age: 53
End: 2023-07-10
Payer: COMMERCIAL

## 2023-07-13 ENCOUNTER — APPOINTMENT (OUTPATIENT)
Dept: PHYSICAL THERAPY | Facility: CLINIC | Age: 53
End: 2023-07-13
Payer: COMMERCIAL

## 2023-07-17 ENCOUNTER — APPOINTMENT (OUTPATIENT)
Dept: PHYSICAL THERAPY | Facility: CLINIC | Age: 53
End: 2023-07-17
Payer: COMMERCIAL

## 2023-07-18 ENCOUNTER — OFFICE VISIT (OUTPATIENT)
Dept: OBGYN CLINIC | Facility: MEDICAL CENTER | Age: 53
End: 2023-07-18

## 2023-07-18 VITALS
SYSTOLIC BLOOD PRESSURE: 133 MMHG | HEIGHT: 73 IN | BODY MASS INDEX: 35.89 KG/M2 | DIASTOLIC BLOOD PRESSURE: 91 MMHG | WEIGHT: 270.8 LBS | HEART RATE: 73 BPM

## 2023-07-18 DIAGNOSIS — Z47.89 AFTERCARE FOLLOWING SURGERY OF THE MUSCULOSKELETAL SYSTEM: Primary | ICD-10-CM

## 2023-07-18 PROCEDURE — 99024 POSTOP FOLLOW-UP VISIT: CPT | Performed by: ORTHOPAEDIC SURGERY

## 2023-07-18 NOTE — PROGRESS NOTES
[facility] [id number]       [facility] Serum  [id number]  1970    ORTHOPAEDIC SURGERY OUTPATIENT NOTE  7/18/2023      HISTORY:  50 y.o. male presents for follow-up on his right distal biceps repair. He is 3 months out from his surgery. He is doing very well. Reports 0 pain. SANE score 100%. No numbness or tingling the upper extremity. Past Medical History:   Diagnosis Date   • Anxiety    • GERD (gastroesophageal reflux disease)    • OCD (obsessive compulsive disorder)    • Sleep apnea     USES CPAP HS   • Umbilical hernia     last assessed 08/30/17       Past Surgical History:   Procedure Laterality Date   • ARTHROSCOPY KNEE Left    • BICEPS TENDON REPAIR Right 5/1/2023    Procedure: REPAIR TENDON BICEPS- right and all necessary procedures;  Surgeon: Alfreda Toney;  Location:  MAIN OR;  Service: Orthopedics   • HERNIA REPAIR  2017   • KNEE SURGERY  0049   • WA RPR UMBILICAL HRNA 5 YRS/> REDUCIBLE N/A 10/27/2017    Procedure: REPAIR HERNIA UMBILICAL with mesh;  Surgeon: Lizeth Mcnulty MD;  Location:  MAIN OR;  Service: General       Social History     Socioeconomic History   • Marital status:      Spouse name: Not on file   • Number of children: Not on file   • Years of education: Not on file   • Highest education level: Not on file   Occupational History   • Not on file   Tobacco Use   • Smoking status: Never   • Smokeless tobacco: Former     Types: Snuff     Quit date: 8/10/2020   Vaping Use   • Vaping Use: Never used   Substance and Sexual Activity   • Alcohol use:  Yes     Alcohol/week: 5.0 standard drinks of alcohol     Types: 5 Cans of beer per week   • Drug use: No   • Sexual activity: Yes     Partners: Female   Other Topics Concern   • Not on file   Social History Narrative    Caffeine use     Social Determinants of Health     Financial Resource Strain: Not on file   Food Insecurity: Not on file Transportation Needs: Not on file   Physical Activity: Not on file   Stress: Not on file   Social Connections: Not on file   Intimate Partner Violence: Not on file   Housing Stability: Not on file       Family History   Problem Relation Age of Onset   • No Known Problems Mother    • Other Father         adopted by father   • Diabetes Son    • Diabetes Son         Patient's Medications   New Prescriptions    No medications on file   Previous Medications    ACETAMINOPHEN (TYLENOL) 500 MG TABLET    Take 1,000 mg by mouth every 6 (six) hours as needed for mild pain    ESCITALOPRAM (LEXAPRO) 10 MG TABLET    TAKE 1 TABLET BY MOUTH EVERY DAY    INDOMETHACIN (INDOCIN SR) 75 MG CR CAPSULE    Take 1 capsule (75 mg total) by mouth daily with breakfast for 14 days    OMEPRAZOLE (PRILOSEC) 40 MG CAPSULE    TAKE 1 CAPSULE BY MOUTH EVERY DAY    OXYCODONE (ROXICODONE) 5 IMMEDIATE RELEASE TABLET    Take 1 tablet (5 mg total) by mouth every 6 (six) hours as needed for moderate pain Max Daily Amount: 20 mg    TADALAFIL (CIALIS) 5 MG TABLET    Take 1 tablet (5 mg total) by mouth daily   Modified Medications    No medications on file   Discontinued Medications    No medications on file       Allergies   Allergen Reactions   • Penicillin V Hives   • Clindamycin Rash     Whole body rash, no airway symptoms   • Chlorhexidine Gluconate Rash     Allergic to CHG clothes, no PCN/Clindamycin        /91   Pulse 73   Ht 6' 1" (1.854 m)   Wt 123 kg (270 lb 12.8 oz)   BMI 35.73 kg/m²      REVIEW OF SYSTEMS:  Constitutional: Negative. HEENT: Negative. Respiratory: Negative. Skin: Negative. Neurological: Negative. Psychiatric/Behavioral: Negative. Musculoskeletal: Negative except for that mentioned in the HPI.     /91   Pulse 73   Ht 6' 1" (1.854 m)   Wt 123 kg (270 lb 12.8 oz)   BMI 35.73 kg/m²   Gen: No acute distress, resting comfortably in bed  HEENT: Eyes clear, moist mucus membranes, hearing intact  Respiratory: No audible wheezing or stridor  Cardiovascular: Well Perfused peripherally, 2+ distal pulse  Abdomen: nondistended, no peritoneal signs     PHYSICAL EXAM:    Right elbow:  Range of motion 0-1 30  5/5 strength with flexion, extension, supination, pronation  Biceps tendon intact  Incision is well-healed  Sensation intact throughout the right upper extremity    IMAGING: No new imaging    ASSESSMENT AND PLAN:  50 y.o. male status post right distal biceps repair on 5/1/2023. He is doing very well. At this point he will return to activities as tolerated without restriction. We will see him back as needed.     Scribe Attestation      I,:  Abi Bashir PA-C am acting as a scribe while in the presence of the attending physician.:       I,:  Bryson Gagnon personally performed the services described in this documentation    as scribed in my presence.:

## 2023-07-19 DIAGNOSIS — F43.23 ADJUSTMENT REACTION WITH ANXIETY AND DEPRESSION: ICD-10-CM

## 2023-07-19 RX ORDER — ESCITALOPRAM OXALATE 10 MG/1
10 TABLET ORAL DAILY
Qty: 90 TABLET | Refills: 0 | Status: SHIPPED | OUTPATIENT
Start: 2023-07-19

## 2023-07-20 ENCOUNTER — APPOINTMENT (OUTPATIENT)
Dept: PHYSICAL THERAPY | Facility: CLINIC | Age: 53
End: 2023-07-20
Payer: COMMERCIAL

## 2023-07-24 ENCOUNTER — APPOINTMENT (OUTPATIENT)
Dept: PHYSICAL THERAPY | Facility: CLINIC | Age: 53
End: 2023-07-24
Payer: COMMERCIAL

## 2023-07-27 ENCOUNTER — APPOINTMENT (OUTPATIENT)
Dept: PHYSICAL THERAPY | Facility: CLINIC | Age: 53
End: 2023-07-27
Payer: COMMERCIAL

## 2023-07-31 ENCOUNTER — APPOINTMENT (OUTPATIENT)
Dept: PHYSICAL THERAPY | Facility: CLINIC | Age: 53
End: 2023-07-31
Payer: COMMERCIAL

## 2023-08-02 ENCOUNTER — OFFICE VISIT (OUTPATIENT)
Dept: FAMILY MEDICINE CLINIC | Facility: MEDICAL CENTER | Age: 53
End: 2023-08-02
Payer: COMMERCIAL

## 2023-08-02 VITALS
WEIGHT: 279 LBS | HEIGHT: 73 IN | HEART RATE: 76 BPM | RESPIRATION RATE: 16 BRPM | SYSTOLIC BLOOD PRESSURE: 136 MMHG | DIASTOLIC BLOOD PRESSURE: 86 MMHG | BODY MASS INDEX: 36.98 KG/M2

## 2023-08-02 DIAGNOSIS — K21.9 GASTROESOPHAGEAL REFLUX DISEASE WITHOUT ESOPHAGITIS: Primary | ICD-10-CM

## 2023-08-02 DIAGNOSIS — F52.21 ERECTILE DYSFUNCTION OF NON-ORGANIC ORIGIN: ICD-10-CM

## 2023-08-02 DIAGNOSIS — N52.9 ERECTILE DYSFUNCTION, UNSPECIFIED ERECTILE DYSFUNCTION TYPE: ICD-10-CM

## 2023-08-02 DIAGNOSIS — E66.9 OBESITY, CLASS II, BMI 35-39.9, ISOLATED (SEE ACTUAL BMI): ICD-10-CM

## 2023-08-02 DIAGNOSIS — F43.23 ADJUSTMENT REACTION WITH ANXIETY AND DEPRESSION: ICD-10-CM

## 2023-08-02 PROBLEM — E66.812 OBESITY, CLASS II, BMI 35-39.9, ISOLATED (SEE ACTUAL BMI): Status: ACTIVE | Noted: 2023-08-02

## 2023-08-02 PROCEDURE — 99214 OFFICE O/P EST MOD 30 MIN: CPT | Performed by: FAMILY MEDICINE

## 2023-08-02 RX ORDER — TADALAFIL 5 MG/1
5 TABLET ORAL DAILY
Qty: 30 TABLET | Refills: 0 | OUTPATIENT
Start: 2023-08-02

## 2023-08-02 RX ORDER — TADALAFIL 5 MG/1
5 TABLET ORAL DAILY
Qty: 30 TABLET | Refills: 5 | Status: SHIPPED | OUTPATIENT
Start: 2023-08-02 | End: 2023-09-01 | Stop reason: SDUPTHER

## 2023-08-02 NOTE — ASSESSMENT & PLAN NOTE
He is taking Lexapro. It is working well for him and is well-tolerated. Continue Lexapro, will check him back in another 6 months.

## 2023-08-02 NOTE — ASSESSMENT & PLAN NOTE
He is taking Cialis 5 mg daily. It is working well for him and no side effects or any problems with it.

## 2023-08-02 NOTE — ASSESSMENT & PLAN NOTE
He is taking omeprazole. It works well. Continue omeprazole, continue dietary modification. He should also drink less alcohol.

## 2023-08-02 NOTE — ASSESSMENT & PLAN NOTE
We spent some time talking about his obesity. I think he is drinking too much beer. More than 2 sixpacks every night. He should consider drinking just 1 or 2 a day. If he cannot, he should probably stop completely. Also, stop snacking between meals.

## 2023-08-02 NOTE — PROGRESS NOTES
Name: Kenny Arguello      : 1970      MRN: [id number]  Encounter Provider: Mariusz Fong MD  Encounter Date: 2023   Encounter department: [address] [place] [address]    Assessment & Plan     1. Gastroesophageal reflux disease without esophagitis  Assessment & Plan:  He is taking omeprazole. It works well. Continue omeprazole, continue dietary modification. He should also drink less alcohol. 2. Adjustment reaction with anxiety and depression  Assessment & Plan:  He is taking Lexapro. It is working well for him and is well-tolerated. Continue Lexapro, will check him back in another 6 months. 3. Erectile dysfunction of non-organic origin  Assessment & Plan:  He is taking Cialis 5 mg daily. It is working well for him and no side effects or any problems with it. 4. Obesity, Class II, BMI 35-39.9, isolated (see actual BMI)  Assessment & Plan:  We spent some time talking about his obesity. I think he is drinking too much beer. More than 2 sixpacks every night. He should consider drinking just 1 or 2 a day. If he cannot, he should probably stop completely. Also, stop snacking between meals. Subjective      Review of Systems   Constitutional: Negative for activity change, fatigue and fever. HENT: Negative for congestion, ear discharge, ear pain, postnasal drip, rhinorrhea, sinus pain, sneezing and sore throat. Eyes: Negative for photophobia, pain, discharge and redness. Respiratory: Negative for apnea, cough, shortness of breath and wheezing. Cardiovascular: Negative for chest pain and palpitations. Gastrointestinal: Negative for abdominal pain, blood in stool, constipation, diarrhea, nausea and vomiting. Endocrine: Negative for polydipsia, polyphagia and polyuria. Genitourinary: Negative for decreased urine volume, difficulty urinating, dysuria, frequency, penile discharge, penile pain and urgency.    Musculoskeletal: Negative for arthralgias, gait problem, joint swelling and neck pain. Skin: Negative for color change and rash. Neurological: Negative for dizziness, tremors, seizures, weakness and headaches. Psychiatric/Behavioral: Negative for agitation and sleep disturbance. The patient is not nervous/anxious. Current Outpatient Medications on File Prior to Visit   Medication Sig   • escitalopram (LEXAPRO) 10 mg tablet Take 1 tablet (10 mg total) by mouth daily   • omeprazole (PriLOSEC) 40 MG capsule TAKE 1 CAPSULE BY MOUTH EVERY DAY   • [DISCONTINUED] tadalafil (CIALIS) 5 MG tablet Take 1 tablet (5 mg total) by mouth daily   • [DISCONTINUED] acetaminophen (TYLENOL) 500 mg tablet Take 1,000 mg by mouth every 6 (six) hours as needed for mild pain   • [DISCONTINUED] indomethacin (INDOCIN SR) 75 mg CR capsule Take 1 capsule (75 mg total) by mouth daily with breakfast for 14 days   • [DISCONTINUED] oxyCODONE (Roxicodone) 5 immediate release tablet Take 1 tablet (5 mg total) by mouth every 6 (six) hours as needed for moderate pain Max Daily Amount: 20 mg       Objective     /86 (BP Location: Left arm, Patient Position: Sitting, Cuff Size: Large)   Pulse 76   Resp 16   Ht 6' 1" (1.854 m)   Wt 127 kg (279 lb)   BMI 36.81 kg/m²     Physical Exam  Vitals and nursing note reviewed. Constitutional:       General: He is not in acute distress. Appearance: Normal appearance. He is well-developed. He is obese. He is not ill-appearing. HENT:      Head: Normocephalic and atraumatic. Right Ear: Tympanic membrane, ear canal and external ear normal.      Left Ear: Tympanic membrane, ear canal and external ear normal.      Nose: Nose normal. No congestion or rhinorrhea. Mouth/Throat:      Mouth: Mucous membranes are moist.   Eyes:      General: Lids are normal.      Extraocular Movements: Extraocular movements intact. Conjunctiva/sclera: Conjunctivae normal.      Pupils: Pupils are equal, round, and reactive to light.    Neck: Thyroid: No thyromegaly. Vascular: No carotid bruit. Cardiovascular:      Rate and Rhythm: Normal rate and regular rhythm. Pulses: Normal pulses. Heart sounds: Normal heart sounds, S1 normal and S2 normal. No murmur heard. Pulmonary:      Effort: Pulmonary effort is normal. No respiratory distress. Breath sounds: Normal breath sounds. No wheezing or rales. Abdominal:      General: Bowel sounds are normal.      Palpations: Abdomen is soft. There is no mass. Tenderness: There is no abdominal tenderness. Musculoskeletal:         General: Normal range of motion. Cervical back: Normal range of motion and neck supple. Lymphadenopathy:      Cervical: No cervical adenopathy. Skin:     General: Skin is warm and dry. Coloration: Skin is not pale. Findings: No rash. Neurological:      Mental Status: He is alert and oriented to person, place, and time. Cranial Nerves: No cranial nerve deficit. Sensory: No sensory deficit. Deep Tendon Reflexes: Reflexes are normal and symmetric. Psychiatric:         Behavior: Behavior normal. Behavior is cooperative. Thought Content:  Thought content normal.         Judgment: Judgment normal.       Moe Caballero MD

## 2023-08-30 DIAGNOSIS — K21.9 GASTROESOPHAGEAL REFLUX DISEASE, UNSPECIFIED WHETHER ESOPHAGITIS PRESENT: ICD-10-CM

## 2023-08-30 RX ORDER — OMEPRAZOLE 40 MG/1
CAPSULE, DELAYED RELEASE ORAL
Qty: 90 CAPSULE | Refills: 1 | Status: SHIPPED | OUTPATIENT
Start: 2023-08-30

## 2023-09-01 DIAGNOSIS — N52.9 ERECTILE DYSFUNCTION, UNSPECIFIED ERECTILE DYSFUNCTION TYPE: ICD-10-CM

## 2023-09-01 RX ORDER — TADALAFIL 5 MG/1
5 TABLET ORAL DAILY
Qty: 30 TABLET | Refills: 0 | Status: SHIPPED | OUTPATIENT
Start: 2023-09-01

## 2023-09-18 DIAGNOSIS — N52.9 ERECTILE DYSFUNCTION, UNSPECIFIED ERECTILE DYSFUNCTION TYPE: ICD-10-CM

## 2023-09-18 RX ORDER — TADALAFIL 5 MG/1
5 TABLET ORAL DAILY
Qty: 30 TABLET | Refills: 0 | Status: SHIPPED | OUTPATIENT
Start: 2023-09-18

## 2023-10-06 ENCOUNTER — VBI (OUTPATIENT)
Dept: ADMINISTRATIVE | Facility: OTHER | Age: 53
End: 2023-10-06

## 2023-10-15 DIAGNOSIS — F43.23 ADJUSTMENT REACTION WITH ANXIETY AND DEPRESSION: ICD-10-CM

## 2023-10-16 DIAGNOSIS — N52.9 ERECTILE DYSFUNCTION, UNSPECIFIED ERECTILE DYSFUNCTION TYPE: ICD-10-CM

## 2023-10-16 RX ORDER — TADALAFIL 5 MG/1
5 TABLET ORAL DAILY
Qty: 30 TABLET | Refills: 0 | Status: SHIPPED | OUTPATIENT
Start: 2023-10-16

## 2023-10-16 RX ORDER — ESCITALOPRAM OXALATE 10 MG/1
10 TABLET ORAL DAILY
Qty: 90 TABLET | Refills: 0 | Status: SHIPPED | OUTPATIENT
Start: 2023-10-16

## 2023-10-31 ENCOUNTER — RA CDI HCC (OUTPATIENT)
Dept: OTHER | Facility: HOSPITAL | Age: 53
End: 2023-10-31

## 2023-10-31 NOTE — PROGRESS NOTES
[address] [facility] coding opportunities          Chart Reviewed number of suggestions sent to Provider: 1  E66.01     Patients Insurance        Commercial Insurance: [employer]

## 2023-11-14 ENCOUNTER — OFFICE VISIT (OUTPATIENT)
Dept: FAMILY MEDICINE CLINIC | Facility: MEDICAL CENTER | Age: 53
End: 2023-11-14
Payer: COMMERCIAL

## 2023-11-14 ENCOUNTER — TELEPHONE (OUTPATIENT)
Dept: FAMILY MEDICINE CLINIC | Facility: MEDICAL CENTER | Age: 53
End: 2023-11-14

## 2023-11-14 VITALS
SYSTOLIC BLOOD PRESSURE: 130 MMHG | HEIGHT: 73 IN | BODY MASS INDEX: 35.49 KG/M2 | WEIGHT: 267.8 LBS | DIASTOLIC BLOOD PRESSURE: 86 MMHG | HEART RATE: 82 BPM | OXYGEN SATURATION: 97 %

## 2023-11-14 DIAGNOSIS — E78.5 BORDERLINE HYPERLIPIDEMIA: Primary | ICD-10-CM

## 2023-11-14 DIAGNOSIS — R53.83 FATIGUE, UNSPECIFIED TYPE: ICD-10-CM

## 2023-11-14 DIAGNOSIS — N52.9 ERECTILE DYSFUNCTION, UNSPECIFIED ERECTILE DYSFUNCTION TYPE: ICD-10-CM

## 2023-11-14 DIAGNOSIS — Z13.29 SCREENING FOR THYROID DISORDER: ICD-10-CM

## 2023-11-14 DIAGNOSIS — F52.21 ERECTILE DYSFUNCTION OF NON-ORGANIC ORIGIN: ICD-10-CM

## 2023-11-14 DIAGNOSIS — G47.33 OSA (OBSTRUCTIVE SLEEP APNEA): Primary | ICD-10-CM

## 2023-11-14 DIAGNOSIS — Z12.5 SCREENING FOR PROSTATE CANCER: ICD-10-CM

## 2023-11-14 DIAGNOSIS — F43.23 ADJUSTMENT REACTION WITH ANXIETY AND DEPRESSION: ICD-10-CM

## 2023-11-14 DIAGNOSIS — R79.89 ELEVATED LFTS: ICD-10-CM

## 2023-11-14 PROCEDURE — 99214 OFFICE O/P EST MOD 30 MIN: CPT | Performed by: FAMILY MEDICINE

## 2023-11-14 RX ORDER — TADALAFIL 5 MG/1
5 TABLET ORAL DAILY
Qty: 30 TABLET | Refills: 0 | Status: SHIPPED | OUTPATIENT
Start: 2023-11-14 | End: 2023-11-14

## 2023-11-14 RX ORDER — TADALAFIL 10 MG/1
10 TABLET ORAL DAILY
Qty: 30 TABLET | Refills: 5 | Status: SHIPPED | OUTPATIENT
Start: 2023-11-14

## 2023-11-14 RX ORDER — TADALAFIL 5 MG/1
5 TABLET ORAL DAILY
Qty: 30 TABLET | Refills: 0 | Status: SHIPPED | OUTPATIENT
Start: 2023-11-14 | End: 2023-11-14 | Stop reason: SDUPTHER

## 2023-11-14 NOTE — ASSESSMENT & PLAN NOTE
Lexapro is working well. He drastically cut back on his drinking. He does not drink every day and when he does it is just 2 or 3 beers at the most.  I think that now he is responding to the Lexapro.

## 2023-11-14 NOTE — ASSESSMENT & PLAN NOTE
His GERD is doing better. He is taking omeprazole 40 mg. He cut down, drastically, with his alcohol. Continue omeprazole, continue attempts at losing weight, he has lost 13 pounds since the last visit, avoidance of foods that exacerbate the symptoms.

## 2023-11-14 NOTE — PROGRESS NOTES
Name: Woody Lopez      : 1970      MRN: [id number]  Encounter Provider: Frances Ross MD  Encounter Date: 2023   Encounter department: [address] [place] [address]    Assessment & Plan     1. LEANNE (obstructive sleep apnea)  Assessment & Plan:  He is doing pretty well with CPAP, he uses a nasal CPAP. However for the first couple hours of the day his eyes are very irritated and dried out. I recommended that he call the sleep doctor and see what he can do about that. I am thinking of drops or maybe an ointment or change to a facemask. 2. Erectile dysfunction, unspecified erectile dysfunction type  -     tadalafil (CIALIS) 10 MG tablet; Take 1 tablet (10 mg total) by mouth daily    3. Adjustment reaction with anxiety and depression  Assessment & Plan:  Lexapro is working well. He drastically cut back on his drinking. He does not drink every day and when he does it is just 2 or 3 beers at the most.  I think that now he is responding to the Lexapro. 4. Erectile dysfunction of non-organic origin  Assessment & Plan:  He wants to increase the dose to 10 mg daily. Although that is not recommended, I am going to try that and he will watch for side effects. And if he does he will cut it in half. BMI Counseling: Body mass index is 35.33 kg/m². The BMI is above normal. Nutrition recommendations include decreasing portion sizes, moderation in carbohydrate intake and increasing intake of lean protein. Exercise recommendations include moderate physical activity 150 minutes/week and exercising 3-5 times per week. Rationale for BMI follow-up plan is due to patient being overweight or obese. Subjective      This is a pleasant 53-year-old man. I saw him 3 months ago and he was drinking a lot of beer. He was having problems with depression and anxiety, fatigue, and GERD symptoms.   He has cut way back and he does not drink every day and when he does it is only 2 or 3 beers at a time.  As I was talking with him, I am thinking that it will stick. He does not have any craving and he now is just drinking socially. I was very happy to see him he looks so much better than he did 3 months ago. Review of Systems   Constitutional:  Negative for activity change, fatigue and fever. HENT:  Negative for congestion, ear discharge, ear pain, postnasal drip, rhinorrhea, sinus pain, sneezing and sore throat. Eyes:  Negative for photophobia, pain, discharge and redness. Respiratory:  Negative for apnea, cough, shortness of breath and wheezing. Cardiovascular:  Negative for chest pain and palpitations. Gastrointestinal:  Negative for abdominal pain, blood in stool, constipation, diarrhea, nausea and vomiting. Endocrine: Negative for polydipsia, polyphagia and polyuria. Genitourinary:  Negative for decreased urine volume, difficulty urinating, dysuria, frequency, penile discharge, penile pain and urgency. Musculoskeletal:  Negative for arthralgias, gait problem, joint swelling and neck pain. Skin:  Negative for color change and rash. Neurological:  Negative for dizziness, tremors, seizures, weakness and headaches. Psychiatric/Behavioral:  Negative for agitation and sleep disturbance. The patient is not nervous/anxious. Current Outpatient Medications on File Prior to Visit   Medication Sig   • escitalopram (LEXAPRO) 10 mg tablet TAKE 1 TABLET BY MOUTH EVERY DAY   • omeprazole (PriLOSEC) 40 MG capsule TAKE 1 CAPSULE BY MOUTH EVERY DAY   • [DISCONTINUED] tadalafil (CIALIS) 5 MG tablet TAKE 1 TABLET (5 MG TOTAL) BY MOUTH DAILY. Objective     /86 (BP Location: Left arm, Patient Position: Sitting, Cuff Size: Large)   Pulse 82   Ht 6' 1" (1.854 m)   Wt 121 kg (267 lb 12.8 oz)   SpO2 97%   BMI 35.33 kg/m²     Physical Exam  Constitutional:       General: He is not in acute distress. Appearance: He is well-developed. He is not diaphoretic.    HENT:      Head: Normocephalic. Right Ear: External ear normal.      Left Ear: External ear normal.      Nose: Nose normal.      Mouth/Throat:      Mouth: Mucous membranes are moist.   Eyes:      Extraocular Movements: Extraocular movements intact. Conjunctiva/sclera: Conjunctivae normal.      Pupils: Pupils are equal, round, and reactive to light. Cardiovascular:      Rate and Rhythm: Normal rate. Pulses: Normal pulses. Pulmonary:      Effort: Pulmonary effort is normal. No respiratory distress. Musculoskeletal:         General: Normal range of motion. Cervical back: Normal range of motion. Skin:     General: Skin is warm and dry. Neurological:      General: No focal deficit present. Mental Status: He is alert and oriented to person, place, and time. Psychiatric:         Behavior: Behavior normal.         Thought Content:  Thought content normal.         Judgment: Judgment normal.       Blanca Hill MD

## 2023-11-14 NOTE — TELEPHONE ENCOUNTER
Please order bloodwork.   Will call pt to make him aware orders have been placed    Routed to Dr Félix Shah

## 2023-11-14 NOTE — ASSESSMENT & PLAN NOTE
He is doing pretty well with CPAP, he uses a nasal CPAP. However for the first couple hours of the day his eyes are very irritated and dried out. I recommended that he call the sleep doctor and see what he can do about that. I am thinking of drops or maybe an ointment or change to a facemask.

## 2023-11-14 NOTE — ASSESSMENT & PLAN NOTE
He wants to increase the dose to 10 mg daily. Although that is not recommended, I am going to try that and he will watch for side effects. And if he does he will cut it in half.

## 2023-12-26 DIAGNOSIS — N52.9 ERECTILE DYSFUNCTION, UNSPECIFIED ERECTILE DYSFUNCTION TYPE: ICD-10-CM

## 2023-12-26 RX ORDER — TADALAFIL 10 MG/1
10 TABLET ORAL DAILY
Qty: 30 TABLET | Refills: 5 | Status: SHIPPED | OUTPATIENT
Start: 2023-12-26 | End: 2024-01-03 | Stop reason: SDUPTHER

## 2023-12-29 DIAGNOSIS — N52.9 ERECTILE DYSFUNCTION, UNSPECIFIED ERECTILE DYSFUNCTION TYPE: ICD-10-CM

## 2023-12-29 RX ORDER — TADALAFIL 10 MG/1
10 TABLET ORAL DAILY
Qty: 30 TABLET | Refills: 5 | OUTPATIENT
Start: 2023-12-29

## 2024-01-03 DIAGNOSIS — N52.9 ERECTILE DYSFUNCTION, UNSPECIFIED ERECTILE DYSFUNCTION TYPE: ICD-10-CM

## 2024-01-04 RX ORDER — TADALAFIL 10 MG/1
10 TABLET ORAL DAILY
Qty: 30 TABLET | Refills: 5 | Status: SHIPPED | OUTPATIENT
Start: 2024-01-04

## 2024-01-13 DIAGNOSIS — F43.23 ADJUSTMENT REACTION WITH ANXIETY AND DEPRESSION: ICD-10-CM

## 2024-01-15 RX ORDER — ESCITALOPRAM OXALATE 10 MG/1
10 TABLET ORAL DAILY
Qty: 90 TABLET | Refills: 1 | Status: SHIPPED | OUTPATIENT
Start: 2024-01-15

## 2024-02-15 ENCOUNTER — APPOINTMENT (OUTPATIENT)
Dept: LAB | Facility: MEDICAL CENTER | Age: 54
End: 2024-02-15
Payer: COMMERCIAL

## 2024-02-15 DIAGNOSIS — R79.89 ELEVATED LFTS: ICD-10-CM

## 2024-02-15 DIAGNOSIS — R53.83 FATIGUE, UNSPECIFIED TYPE: ICD-10-CM

## 2024-02-15 DIAGNOSIS — E78.5 BORDERLINE HYPERLIPIDEMIA: ICD-10-CM

## 2024-02-15 DIAGNOSIS — Z13.29 SCREENING FOR THYROID DISORDER: ICD-10-CM

## 2024-02-15 DIAGNOSIS — Z12.5 SCREENING FOR PROSTATE CANCER: ICD-10-CM

## 2024-02-15 LAB
ALBUMIN SERPL BCP-MCNC: 4.4 G/DL (ref 3.5–5)
ALP SERPL-CCNC: 46 U/L (ref 34–104)
ALT SERPL W P-5'-P-CCNC: 25 U/L (ref 7–52)
ANION GAP SERPL CALCULATED.3IONS-SCNC: 8 MMOL/L
AST SERPL W P-5'-P-CCNC: 25 U/L (ref 13–39)
BASOPHILS # BLD AUTO: 0.04 THOUSANDS/ÂΜL (ref 0–0.1)
BASOPHILS NFR BLD AUTO: 1 % (ref 0–1)
BILIRUB SERPL-MCNC: 0.63 MG/DL (ref 0.2–1)
BUN SERPL-MCNC: 18 MG/DL (ref 5–25)
CALCIUM SERPL-MCNC: 9 MG/DL (ref 8.4–10.2)
CHLORIDE SERPL-SCNC: 100 MMOL/L (ref 96–108)
CHOLEST SERPL-MCNC: 182 MG/DL
CO2 SERPL-SCNC: 32 MMOL/L (ref 21–32)
CREAT SERPL-MCNC: 1.27 MG/DL (ref 0.6–1.3)
EOSINOPHIL # BLD AUTO: 0.17 THOUSAND/ÂΜL (ref 0–0.61)
EOSINOPHIL NFR BLD AUTO: 3 % (ref 0–6)
ERYTHROCYTE [DISTWIDTH] IN BLOOD BY AUTOMATED COUNT: 12.6 % (ref 11.6–15.1)
GFR SERPL CREATININE-BSD FRML MDRD: 64 ML/MIN/1.73SQ M
GLUCOSE P FAST SERPL-MCNC: 85 MG/DL (ref 65–99)
HCT VFR BLD AUTO: 53.3 % (ref 36.5–49.3)
HDLC SERPL-MCNC: 38 MG/DL
HGB BLD-MCNC: 17.5 G/DL (ref 12–17)
IMM GRANULOCYTES # BLD AUTO: 0.01 THOUSAND/UL (ref 0–0.2)
IMM GRANULOCYTES NFR BLD AUTO: 0 % (ref 0–2)
LDLC SERPL CALC-MCNC: 106 MG/DL (ref 0–100)
LYMPHOCYTES # BLD AUTO: 1.39 THOUSANDS/ÂΜL (ref 0.6–4.47)
LYMPHOCYTES NFR BLD AUTO: 28 % (ref 14–44)
MCH RBC QN AUTO: 31 PG (ref 26.8–34.3)
MCHC RBC AUTO-ENTMCNC: 32.8 G/DL (ref 31.4–37.4)
MCV RBC AUTO: 94 FL (ref 82–98)
MONOCYTES # BLD AUTO: 0.52 THOUSAND/ÂΜL (ref 0.17–1.22)
MONOCYTES NFR BLD AUTO: 11 % (ref 4–12)
NEUTROPHILS # BLD AUTO: 2.81 THOUSANDS/ÂΜL (ref 1.85–7.62)
NEUTS SEG NFR BLD AUTO: 57 % (ref 43–75)
NRBC BLD AUTO-RTO: 0 /100 WBCS
PLATELET # BLD AUTO: 240 THOUSANDS/UL (ref 149–390)
PMV BLD AUTO: 11.2 FL (ref 8.9–12.7)
POTASSIUM SERPL-SCNC: 4.2 MMOL/L (ref 3.5–5.3)
PROT SERPL-MCNC: 7 G/DL (ref 6.4–8.4)
PSA SERPL-MCNC: 1.1 NG/ML (ref 0–4)
RBC # BLD AUTO: 5.65 MILLION/UL (ref 3.88–5.62)
SODIUM SERPL-SCNC: 140 MMOL/L (ref 135–147)
TRIGL SERPL-MCNC: 192 MG/DL
TSH SERPL DL<=0.05 MIU/L-ACNC: 1.82 UIU/ML (ref 0.45–4.5)
WBC # BLD AUTO: 4.94 THOUSAND/UL (ref 4.31–10.16)

## 2024-02-15 PROCEDURE — 36415 COLL VENOUS BLD VENIPUNCTURE: CPT

## 2024-02-15 PROCEDURE — 80061 LIPID PANEL: CPT

## 2024-02-15 PROCEDURE — 80053 COMPREHEN METABOLIC PANEL: CPT

## 2024-02-15 PROCEDURE — 84443 ASSAY THYROID STIM HORMONE: CPT

## 2024-02-15 PROCEDURE — 85025 COMPLETE CBC W/AUTO DIFF WBC: CPT

## 2024-02-15 PROCEDURE — G0103 PSA SCREENING: HCPCS

## 2024-02-20 ENCOUNTER — OFFICE VISIT (OUTPATIENT)
Dept: FAMILY MEDICINE CLINIC | Facility: MEDICAL CENTER | Age: 54
End: 2024-02-20
Payer: COMMERCIAL

## 2024-02-20 VITALS
WEIGHT: 257.4 LBS | TEMPERATURE: 97.5 F | DIASTOLIC BLOOD PRESSURE: 86 MMHG | OXYGEN SATURATION: 97 % | HEART RATE: 70 BPM | SYSTOLIC BLOOD PRESSURE: 136 MMHG | BODY MASS INDEX: 34.11 KG/M2 | HEIGHT: 73 IN

## 2024-02-20 DIAGNOSIS — Z00.00 PREVENTATIVE HEALTH CARE: Primary | ICD-10-CM

## 2024-02-20 DIAGNOSIS — G47.33 OSA (OBSTRUCTIVE SLEEP APNEA): ICD-10-CM

## 2024-02-20 DIAGNOSIS — K21.9 GASTROESOPHAGEAL REFLUX DISEASE WITHOUT ESOPHAGITIS: ICD-10-CM

## 2024-02-20 DIAGNOSIS — Z12.11 SCREENING FOR MALIGNANT NEOPLASM OF COLON: ICD-10-CM

## 2024-02-20 DIAGNOSIS — F43.23 ADJUSTMENT REACTION WITH ANXIETY AND DEPRESSION: ICD-10-CM

## 2024-02-20 DIAGNOSIS — E78.5 BORDERLINE HYPERLIPIDEMIA: ICD-10-CM

## 2024-02-20 DIAGNOSIS — F52.21 ERECTILE DYSFUNCTION OF NON-ORGANIC ORIGIN: ICD-10-CM

## 2024-02-20 PROCEDURE — 99213 OFFICE O/P EST LOW 20 MIN: CPT | Performed by: FAMILY MEDICINE

## 2024-02-20 PROCEDURE — 99396 PREV VISIT EST AGE 40-64: CPT | Performed by: FAMILY MEDICINE

## 2024-02-20 NOTE — ASSESSMENT & PLAN NOTE
He is doing well on Lexapro.  He tapered it off several months ago, correctly, however after several weeks it became clear to him that he needs to take it Lexapro.  He described it as being edgy and short tempered.    Continue Lexapro.

## 2024-02-20 NOTE — PROGRESS NOTES
ADULT ANNUAL PHYSICAL  [facility] [place]    NAME: Sixto Adan  AGE: 53 y.o. SEX: male  : 1970     DATE: 2024     Assessment and Plan:     Problem List Items Addressed This Visit        Digestive    Esophageal reflux     He is doing well.  He is taking omeprazole.    He may taper off omeprazole, he lost a lot of weight, his diet is much better and he does not drink excessive beer anymore.  So we talked about the tapering and then if he needs something occasionally I recommended Pepcid as needed.            Respiratory    LEANNE (obstructive sleep apnea)     He has CPAP and he feels much better after starting that several years ago.            Other    Erectile dysfunction of non-organic origin     Cialis is working well for him.         Adjustment reaction with anxiety and depression     He is doing well on Lexapro.  He tapered it off several months ago, correctly, however after several weeks it became clear to him that he needs to take it Lexapro.  He described it as being edgy and short tempered.    Continue Lexapro.         Borderline hyperlipidemia     Now that he is lost some weight, and his diet is much better, his LDL went down from 205 last May down to 105 just last week.    Keep on losing weight, eat well and try to get some exercise.        Other Visit Diagnoses     Preventative health care    -  Primary    Screening for malignant neoplasm of colon        Relevant Orders    Cologuard          Immunizations and preventive care screenings were discussed with patient today. Appropriate education was printed on patient's after visit summary.    Discussed risks and benefits of prostate cancer screening. We discussed the controversial history of PSA screening for prostate cancer in the [place] as well as the risk of over detection and over treatment of prostate cancer by way of PSA screening.  The patient understands that PSA blood testing  is an imperfect way to screen for prostate cancer and that elevated PSA levels in the blood may also be caused by infection, inflammation, prostatic trauma or manipulation, urological procedures, or by benign prostatic enlargement.    The role of the digital rectal examination in prostate cancer screening was also discussed and I discussed with him that there is large interobserver variability in the findings of digital rectal examination.    Counseling:  Alcohol/drug use: discussed moderation in alcohol intake, the recommendations for healthy alcohol use, and avoidance of illicit drug use.  Exercise: the importance of regular exercise/physical activity was discussed. Recommend exercise 3-5 times per week for at least 30 minutes.          No follow-ups on file.     Chief Complaint:     Chief Complaint   Patient presents with   • Annual Exam      History of Present Illness:     Adult Annual Physical   Patient here for a comprehensive physical exam. The patient reports no problems.    This is a very pleasant 53-year-old man.  He is living with a woman and her child.  He was  many years ago.  He works at [employer].  He has 2 adult sons from his first wife.    He is due for colorectal cancer screening, he is up-to-date with prostate cancer screening    Diet and Physical Activity  Diet/Nutrition: well balanced diet.   Exercise: no formal exercise.      Depression Screening  PHQ-2/9 Depression Screening    Little interest or pleasure in doing things: 0 - not at all  Feeling down, depressed, or hopeless: 0 - not at all  Trouble falling or staying asleep, or sleeping too much: 0 - not at all  Feeling tired or having little energy: 1 - several days  Poor appetite or overeatin - not at all  Feeling bad about yourself - or that you are a failure or have let yourself or your family down: 0 - not at all  Trouble concentrating on things, such as reading the newspaper or watching television: 0 - not at  all  Moving or speaking so slowly that other people could have noticed. Or the opposite - being so fidgety or restless that you have been moving around a lot more than usual: 0 - not at all  Thoughts that you would be better off dead, or of hurting yourself in some way: 0 - not at all  PHQ-9 Score: 1  PHQ-9 Interpretation: No or Minimal depression       General Health  Sleep: sleeps well.   Hearing: normal - bilateral.  Vision: no vision problems.   Dental: regular dental visits.        Health  Symptoms include: none     Review of Systems:     Review of Systems   Constitutional:  Negative for activity change, fatigue and fever.   HENT:  Negative for congestion, ear discharge, ear pain, postnasal drip, rhinorrhea, sinus pain, sneezing and sore throat.    Eyes:  Positive for redness (Chronic dry eyes.  He will be following up with his eye doctor.). Negative for photophobia, pain and discharge.   Respiratory:  Negative for apnea, cough, shortness of breath and wheezing.    Cardiovascular:  Negative for chest pain and palpitations.   Gastrointestinal:  Negative for abdominal pain, blood in stool, constipation, diarrhea, nausea and vomiting.   Endocrine: Negative for polydipsia, polyphagia and polyuria.   Genitourinary:  Negative for decreased urine volume, difficulty urinating, dysuria, frequency, penile discharge, penile pain and urgency.   Musculoskeletal:  Negative for arthralgias, gait problem, joint swelling and neck pain.   Skin:  Negative for color change and rash.   Neurological:  Negative for dizziness, tremors, seizures, weakness and headaches.   Psychiatric/Behavioral:  Negative for agitation and sleep disturbance. The patient is not nervous/anxious.       Past Medical History:     Past Medical History:   Diagnosis Date   • Anxiety    • GERD (gastroesophageal reflux disease)    • OCD (obsessive compulsive disorder)    • Sleep apnea     USES CPAP HS   • Umbilical hernia     last assessed 08/30/17      Past  Surgical History:     Past Surgical History:   Procedure Laterality Date   • ARTHROSCOPY KNEE Left    • BICEPS TENDON REPAIR Right 5/1/2023    Procedure: REPAIR TENDON BICEPS- right and all necessary procedures;  Surgeon: Jordana Lucas;  Location:  MAIN OR;  Service: Orthopedics   • HERNIA REPAIR  2017   • KNEE SURGERY  2005   • [place] RPR UMBILICAL HRNA 5 YRS/> REDUCIBLE N/A 10/27/2017    Procedure: REPAIR HERNIA UMBILICAL with mesh;  Surgeon: Porfirio Crenshaw MD;  Location:  MAIN OR;  Service: General      Family History:     Family History   Problem Relation Age of Onset   • No Known Problems Mother    • Other Father         adopted by father   • Diabetes Son    • Diabetes Son       Social History:     Social History     Socioeconomic History   • Marital status:      Spouse name: None   • Number of children: None   • Years of education: None   • Highest education level: None   Occupational History   • None   Tobacco Use   • Smoking status: Never   • Smokeless tobacco: Former     Types: Snuff     Quit date: 8/10/2020   Vaping Use   • Vaping status: Never Used   Substance and Sexual Activity   • Alcohol use: Yes     Alcohol/week: 5.0 standard drinks of alcohol     Types: 5 Cans of beer per week   • Drug use: No   • Sexual activity: Yes     Partners: Female   Other Topics Concern   • None   Social History Narrative    Caffeine use     Social Determinants of Health     Financial Resource Strain: Not on file   Food Insecurity: Not on file   Transportation Needs: Not on file   Physical Activity: Not on file   Stress: Not on file   Social Connections: Not on file   Intimate Partner Violence: Not on file   Housing Stability: Not on file      Current Medications:     Current Outpatient Medications   Medication Sig Dispense Refill   • escitalopram (LEXAPRO) 10 mg tablet TAKE 1 TABLET BY MOUTH EVERY DAY 90 tablet 1   • omeprazole (PriLOSEC) 40 MG capsule TAKE 1 CAPSULE BY MOUTH EVERY DAY 90 capsule 1   • tadalafil  "(CIALIS) 10 MG tablet Take 1 tablet (10 mg total) by mouth daily 30 tablet 5     No current facility-administered medications for this visit.      Allergies:     Allergies   Allergen Reactions   • Penicillin V Hives   • Clindamycin Rash     Whole body rash, no airway symptoms   • Chlorhexidine Gluconate Rash     Allergic to CHG clothes, no PCN/Clindamycin      Physical Exam:     /86 (BP Location: Left arm, Patient Position: Sitting, Cuff Size: Large)   Pulse 70   Temp 97.5 °F (36.4 °C) (Temporal)   Ht 6' 1\" (1.854 m)   Wt 117 kg (257 lb 6.4 oz)   SpO2 97%   BMI 33.96 kg/m²     Physical Exam  Vitals and nursing note reviewed.   Constitutional:       General: He is not in acute distress.     Appearance: Normal appearance. He is well-developed. He is not ill-appearing.   HENT:      Head: Normocephalic.      Right Ear: Tympanic membrane, ear canal and external ear normal.      Left Ear: Tympanic membrane, ear canal and external ear normal.      Nose: Nose normal. No rhinorrhea.      Mouth/Throat:      Mouth: Mucous membranes are moist.      Pharynx: Uvula midline. No oropharyngeal exudate.      Tonsils: No tonsillar exudate.   Eyes:      General: Lids are normal.      Extraocular Movements: Extraocular movements intact.      Conjunctiva/sclera:      Right eye: Right conjunctiva is injected. No exudate or hemorrhage.     Left eye: Left conjunctiva is injected. No exudate or hemorrhage.     Pupils: Pupils are equal, round, and reactive to light.   Neck:      Thyroid: No thyromegaly.      Vascular: No carotid bruit.      Trachea: Trachea normal.   Cardiovascular:      Rate and Rhythm: Normal rate and regular rhythm.      Pulses: Normal pulses.      Heart sounds: Normal heart sounds, S1 normal and S2 normal. No murmur heard.  Pulmonary:      Effort: Pulmonary effort is normal. No respiratory distress.      Breath sounds: Normal breath sounds.   Abdominal:      General: Bowel sounds are normal.      Palpations: " Abdomen is soft.      Tenderness: There is no abdominal tenderness.      Hernia: No hernia is present.   Musculoskeletal:         General: Normal range of motion.      Cervical back: Normal range of motion and neck supple.   Lymphadenopathy:      Cervical: No cervical adenopathy.   Skin:     General: Skin is warm and dry.   Neurological:      General: No focal deficit present.      Mental Status: He is alert and oriented to person, place, and time.      Cranial Nerves: No cranial nerve deficit.      Sensory: No sensory deficit.      Gait: Gait normal.      Deep Tendon Reflexes: Reflexes are normal and symmetric.   Psychiatric:         Speech: Speech normal.         Behavior: Behavior normal. Behavior is cooperative.         Thought Content: Thought content normal.          Suresh Bryant MD  [facility]

## 2024-02-20 NOTE — ASSESSMENT & PLAN NOTE
He is doing well.  He is taking omeprazole.    He may taper off omeprazole, he lost a lot of weight, his diet is much better and he does not drink excessive beer anymore.  So we talked about the tapering and then if he needs something occasionally I recommended Pepcid as needed.

## 2024-02-20 NOTE — ASSESSMENT & PLAN NOTE
Now that he is lost some weight, and his diet is much better, his LDL went down from 205 last May down to 105 just last week.    Keep on losing weight, eat well and try to get some exercise.

## 2024-02-26 DIAGNOSIS — K21.9 GASTROESOPHAGEAL REFLUX DISEASE, UNSPECIFIED WHETHER ESOPHAGITIS PRESENT: ICD-10-CM

## 2024-02-26 RX ORDER — OMEPRAZOLE 40 MG/1
40 CAPSULE, DELAYED RELEASE ORAL DAILY
Qty: 90 CAPSULE | Refills: 0 | Status: SHIPPED | OUTPATIENT
Start: 2024-02-26

## 2024-05-31 DIAGNOSIS — K21.9 GASTROESOPHAGEAL REFLUX DISEASE, UNSPECIFIED WHETHER ESOPHAGITIS PRESENT: ICD-10-CM

## 2024-05-31 RX ORDER — OMEPRAZOLE 40 MG/1
40 CAPSULE, DELAYED RELEASE ORAL DAILY
Qty: 90 CAPSULE | Refills: 1 | Status: SHIPPED | OUTPATIENT
Start: 2024-05-31

## 2024-06-07 NOTE — TELEPHONE ENCOUNTER
E-Prescribing Status: Receipt confirmed by pharmacy (12/26/2023  3:09 PM EST)  duplicate patient also has 5 refills available  
Faxed rx request looked like it came from a different pharmacy  
Skin normal color for race, warm, dry and intact. No evidence of rash.

## 2024-06-21 DIAGNOSIS — N52.9 ERECTILE DYSFUNCTION, UNSPECIFIED ERECTILE DYSFUNCTION TYPE: ICD-10-CM

## 2024-06-21 RX ORDER — TADALAFIL 10 MG/1
10 TABLET ORAL DAILY
Qty: 30 TABLET | Refills: 5 | Status: SHIPPED | OUTPATIENT
Start: 2024-06-21

## 2024-07-14 DIAGNOSIS — F43.23 ADJUSTMENT REACTION WITH ANXIETY AND DEPRESSION: ICD-10-CM

## 2024-07-15 RX ORDER — ESCITALOPRAM OXALATE 10 MG/1
10 TABLET ORAL DAILY
Qty: 90 TABLET | Refills: 0 | Status: SHIPPED | OUTPATIENT
Start: 2024-07-15

## 2024-08-06 ENCOUNTER — VBI (OUTPATIENT)
Dept: ADMINISTRATIVE | Facility: OTHER | Age: 54
End: 2024-08-06

## 2024-08-06 NOTE — TELEPHONE ENCOUNTER
08/06/24 11:22 AM     Chart reviewed for CRC: Colonoscopy ; nothing is submitted to the patient's insurance at this time.     Tre Dorsey MA   [facility]

## 2024-08-21 ENCOUNTER — OFFICE VISIT (OUTPATIENT)
Dept: FAMILY MEDICINE CLINIC | Facility: MEDICAL CENTER | Age: 54
End: 2024-08-21
Payer: COMMERCIAL

## 2024-08-21 VITALS
RESPIRATION RATE: 18 BRPM | OXYGEN SATURATION: 94 % | HEIGHT: 73 IN | HEART RATE: 71 BPM | DIASTOLIC BLOOD PRESSURE: 82 MMHG | BODY MASS INDEX: 34.14 KG/M2 | WEIGHT: 257.6 LBS | TEMPERATURE: 97.3 F | SYSTOLIC BLOOD PRESSURE: 126 MMHG

## 2024-08-21 DIAGNOSIS — G47.33 OSA (OBSTRUCTIVE SLEEP APNEA): ICD-10-CM

## 2024-08-21 DIAGNOSIS — Z76.89 ENCOUNTER TO ESTABLISH CARE WITH NEW DOCTOR: Primary | ICD-10-CM

## 2024-08-21 DIAGNOSIS — F43.23 ADJUSTMENT REACTION WITH ANXIETY AND DEPRESSION: ICD-10-CM

## 2024-08-21 DIAGNOSIS — K21.9 GASTROESOPHAGEAL REFLUX DISEASE WITHOUT ESOPHAGITIS: ICD-10-CM

## 2024-08-21 DIAGNOSIS — E78.5 BORDERLINE HYPERLIPIDEMIA: ICD-10-CM

## 2024-08-21 DIAGNOSIS — F52.21 ERECTILE DYSFUNCTION OF NON-ORGANIC ORIGIN: ICD-10-CM

## 2024-08-21 PROCEDURE — 99214 OFFICE O/P EST MOD 30 MIN: CPT

## 2024-08-21 NOTE — PROGRESS NOTES
Assessment/Plan:    Discussed dietary changes, advised to start exercising regularly to try and lose weight.  Referral placed to gastroenterology.   Decrease omeprazole to 20 mg daily.  Advised about Shingrix vaccine.  Return in 6 months for annual physical, sooner if needed.     1. Encounter to establish care with new doctor  54-year-old male presents to establish care with new provider, previous patient of Dr. Bryant.    2. LEANNE (obstructive sleep apnea)  Compliant with CPAP.    3. Gastroesophageal reflux disease without esophagitis  Will trial weaning down to 20 mg daily.  Advised patient if tolerating this dose, may try to take every other day.  Referral placed to gastroenterology to further discuss digestive issues, GERD, bloating.  Patient due for colonoscopy, I also recommend EGD.  - omeprazole (PriLOSEC) 20 mg delayed release capsule; Take 1 capsule (20 mg total) by mouth daily  Dispense: 90 capsule; Refill: 1  - Ambulatory Referral to Gastroenterology; Future    4. Adjustment reaction with anxiety and depression  Stable with current management.  Continue Lexapro at current dose.    5. Erectile dysfunction of non-organic origin  Stable with current management, continue Cialis.    6. Borderline hyperlipidemia  Lipids improved overall most recent labs.  Discussed elevated triglycerides and advised limiting saturated fat intake.      Subjective:      Patient ID: Sixto Adan is a 54 y.o. male.    54-year-old male presents to establish care with new provider.  He is a previous patient of Dr. Bryant.  He last saw Dr. Bryant in February of this year for his annual physical exam.  Today, he is due for his follow-up visit.  Past medical history includes but is not limited to; LEANNE, borderline hyperlipidemia, GERD, erectile dysfunction, anxiety and depression, obesity.  He is doing well with Cialis for his erectile dysfunction which he reports taking due to being on Lexapro with side effect of erectile dysfunction.   "He is doing well with omeprazole for GERD.  He tells me he tried to wean himself off of this medication but was unable to.  He reports feeling like food is not digesting, difficulty even with swallowing water and feeling bloated with p.o. intake.  He would like to eventually come off of his omeprazole.  He has never had an EGD or colonoscopy however is now agreeable to this after discussion.  He is not currently on any medication for hyperlipidemia after losing weight and LDL going down.  He reports compliance with CPAP for his LEANNE.  He is doing well with Lexapro for his anxiety and depression.  He is interested in losing more weight but seems like it is hard for him, he feels as though his food does not digest, feels bloated. He reports healthy diet overall but is not currently exercising.           The following portions of the patient's history were reviewed and updated as appropriate: allergies, current medications, past medical history, past social history, past surgical history, and problem list.    Review of Systems   Constitutional: Negative.    HENT: Negative.     Eyes: Negative.    Respiratory: Negative.     Cardiovascular: Negative.    Gastrointestinal: Negative.    Endocrine: Negative.    Genitourinary: Negative.    Musculoskeletal: Negative.    Skin: Negative.    Allergic/Immunologic: Negative.    Neurological: Negative.    Hematological: Negative.    Psychiatric/Behavioral: Negative.           Objective:      /82 (BP Location: Left arm, Patient Position: Sitting, Cuff Size: Large)   Pulse 71   Temp (!) 97.3 °F (36.3 °C) (Temporal)   Resp 18   Ht 6' 1\" (1.854 m)   Wt 117 kg (257 lb 9.6 oz)   SpO2 94%   BMI 33.99 kg/m²          Physical Exam  Vitals and nursing note reviewed.   Constitutional:       General: He is not in acute distress.     Appearance: Normal appearance. He is obese. He is not ill-appearing.   HENT:      Head: Normocephalic and atraumatic.   Cardiovascular:      Rate and " Rhythm: Normal rate and regular rhythm.      Pulses: Normal pulses.      Heart sounds: Normal heart sounds.   Pulmonary:      Effort: Pulmonary effort is normal.      Breath sounds: Normal breath sounds.   Skin:     General: Skin is warm and dry.   Neurological:      General: No focal deficit present.      Mental Status: He is alert and oriented to person, place, and time. Mental status is at baseline.   Psychiatric:         Mood and Affect: Mood normal.         Behavior: Behavior normal.         Thought Content: Thought content normal.                    CLARICE Barr

## 2024-10-12 DIAGNOSIS — F43.23 ADJUSTMENT REACTION WITH ANXIETY AND DEPRESSION: ICD-10-CM

## 2024-10-12 RX ORDER — ESCITALOPRAM OXALATE 10 MG/1
10 TABLET ORAL DAILY
Qty: 90 TABLET | Refills: 0 | Status: SHIPPED | OUTPATIENT
Start: 2024-10-12

## 2024-10-14 ENCOUNTER — CONSULT (OUTPATIENT)
Dept: GASTROENTEROLOGY | Facility: CLINIC | Age: 54
End: 2024-10-14
Payer: COMMERCIAL

## 2024-10-14 VITALS
BODY MASS INDEX: 34.11 KG/M2 | HEIGHT: 73 IN | SYSTOLIC BLOOD PRESSURE: 126 MMHG | DIASTOLIC BLOOD PRESSURE: 86 MMHG | WEIGHT: 257.4 LBS | HEART RATE: 69 BPM | TEMPERATURE: 97.5 F | OXYGEN SATURATION: 98 %

## 2024-10-14 DIAGNOSIS — Z12.11 SCREENING FOR COLON CANCER: ICD-10-CM

## 2024-10-14 DIAGNOSIS — R13.10 DYSPHAGIA, UNSPECIFIED TYPE: Primary | ICD-10-CM

## 2024-10-14 DIAGNOSIS — K21.9 GASTROESOPHAGEAL REFLUX DISEASE WITHOUT ESOPHAGITIS: ICD-10-CM

## 2024-10-14 PROCEDURE — 99204 OFFICE O/P NEW MOD 45 MIN: CPT | Performed by: INTERNAL MEDICINE

## 2024-10-14 NOTE — PROGRESS NOTES
[facility] - Outpatient Note  Sixto Adan 54 y.o. male MRN: [id number]  Encounter: [id number]      ASSESSMENT AND PLAN:    Sixto Adan is a 54 y.o. old pleasant male with PMH of GERD, LEANNE, obesity who presents for followup    GERD  Dysphagia   bloating, early satiety  Plan for EGD with biopsies for EOE, H. pylori and celiac disease.  Will hold off on empiric dilation unless deemed appropriate at the time of the endoscopy.  Okay to trial omeprazole taper with 20 mg every other day for 4 weeks then every third day for 2 weeks then off.  GERD lifestyle changes discussed, including avoidance of trigger foods (potential foods include coffee, caffeine, chocolate, mint, tomato-based products, spicy foods, fatty foods), avoid tight fitting clothing, elevated head of bed 30 degrees, avoid eating 2-3 hours prior to bedtime, weight loss, avoid alcohol, avoid tobacco use.  Consider gastric emptying study in the future    Colon cancer screening-no previous colonoscopy.  No family history of colon cancer.  Plan for colonoscopy    Constipation, bloating-his constipation may be the reason for his bloating  Increase fluid intake  Increase fiber intake  Start psyllium fiber  Use stepping stool  Consider MiraLAX  Patient will see how he feels after his colonoscopy and if he feels much better than his bloating is likely from his constipation      Obesity -mild.  BMI 33.96.  Counseled on diet exercise and weight loss.    1. Gastroesophageal reflux disease without esophagitis    - Ambulatory Referral to Gastroenterology    ______________________________________________________________________    SUBJECTIVE:      Patient reports history of uncontrolled GERD requiring omeprazole.  He was been on omeprazole for 13 years with no previous EGD.  He has previously tried to taper with symptomatic bounce back.  He is trying to taper now again.  He was previously on 40 mg daily and has decreased to 20 mg daily.  He  inquires about decreasing again.  He also reports dysphagia to solids.  Reports early satiety and bloating.  He has constipation with bowel movement every other day but sometimes with incomplete evacuation.      I reviewed prior external notes    I reviewed previous lab results and images      REVIEW OF SYSTEMS:     REVIEW OF ALL OTHER SYSTEMS IS OTHERWISE NEGATIVE.      Historical Information   Past Medical History:   Diagnosis Date    Anxiety     GERD (gastroesophageal reflux disease)     OCD (obsessive compulsive disorder)     Sleep apnea     USES CPAP HS    Umbilical hernia     last assessed 08/30/17     Past Surgical History:   Procedure Laterality Date    ARTHROSCOPY KNEE Left     BICEPS TENDON REPAIR Right 5/1/2023    Procedure: REPAIR TENDON BICEPS- right and all necessary procedures;  Surgeon: Jordana Lucas;  Location:  MAIN OR;  Service: Orthopedics    HERNIA REPAIR  2017    KNEE SURGERY  2005    [place] RPR UMBILICAL HRNA 5 YRS/> REDUCIBLE N/A 10/27/2017    Procedure: REPAIR HERNIA UMBILICAL with mesh;  Surgeon: Porfirio Crenshaw MD;  Location:  MAIN OR;  Service: General     Social History   Social History     Substance and Sexual Activity   Alcohol Use Yes    Alcohol/week: 5.0 standard drinks of alcohol    Types: 5 Cans of beer per week     Social History     Substance and Sexual Activity   Drug Use No     Social History     Tobacco Use   Smoking Status Never   Smokeless Tobacco Former    Types: Snuff    Quit date: 8/10/2020     Family History   Problem Relation Age of Onset    No Known Problems Mother     Other Father         adopted by father    Diabetes Son     Diabetes Son        Meds/Allergies       Current Outpatient Medications:     escitalopram (LEXAPRO) 10 mg tablet    omeprazole (PriLOSEC) 20 mg delayed release capsule    tadalafil (CIALIS) 10 MG tablet    Allergies   Allergen Reactions    Penicillin V Hives    Clindamycin Rash     Whole body rash, no airway symptoms    Chlorhexidine Gluconate  "Rash     Allergic to CHG clothes, no PCN/Clindamycin           Objective     Blood pressure 126/86, pulse 69, temperature 97.5 °F (36.4 °C), temperature source Tympanic, height 6' 1\" (1.854 m), weight 117 kg (257 lb 6.4 oz), SpO2 98%. Body mass index is 33.96 kg/m².      PHYSICAL EXAM:      General Appearance:   Alert, cooperative, no distress   HEENT:   Normocephalic, atraumatic, anicteric.     Neck:  Supple, symmetrical, trachea midline   Lungs:   Clear to auscultation bilaterally; no rales, rhonchi or wheezing; respirations unlabored    Heart::   Regular rate and rhythm; no murmur, rub, or gallop.   Abdomen:   Soft, non-tender, non-distended; normal bowel sounds; no masses, no organomegaly    Genitalia:   Deferred    Rectal:   Deferred    Extremities:  No cyanosis, clubbing or edema    Pulses:  2+ and symmetric    Skin:  No jaundice, rashes, or lesions    Lymph nodes:  No palpable cervical lymphadenopathy        Lab Results:   No visits with results within 1 Day(s) from this visit.   Latest known visit with results is:   Appointment on 02/15/2024   Component Date Value    Cholesterol 02/15/2024 182     Triglycerides 02/15/2024 192 (H)     HDL, Direct 02/15/2024 38 (L)     LDL Calculated 02/15/2024 106 (H)     Sodium 02/15/2024 140     Potassium 02/15/2024 4.2     Chloride 02/15/2024 100     CO2 02/15/2024 32     ANION GAP 02/15/2024 8     BUN 02/15/2024 18     Creatinine 02/15/2024 1.27     Glucose, Fasting 02/15/2024 85     Calcium 02/15/2024 9.0     AST 02/15/2024 25     ALT 02/15/2024 25     Alkaline Phosphatase 02/15/2024 46     Total Protein 02/15/2024 7.0     Albumin 02/15/2024 4.4     Total Bilirubin 02/15/2024 0.63     eGFR 02/15/2024 64     WBC 02/15/2024 4.94     RBC 02/15/2024 5.65 (H)     Hemoglobin 02/15/2024 17.5 (H)     Hematocrit 02/15/2024 53.3 (H)     MCV 02/15/2024 94     MCH 02/15/2024 31.0     MCHC 02/15/2024 32.8     RDW 02/15/2024 12.6     MPV 02/15/2024 11.2     Platelets 02/15/2024 240  "    nRBC 02/15/2024 0     Segmented % 02/15/2024 57     Immature Grans % 02/15/2024 0     Lymphocytes % 02/15/2024 28     Monocytes % 02/15/2024 11     Eosinophils Relative 02/15/2024 3     Basophils Relative 02/15/2024 1     Absolute Neutrophils 02/15/2024 2.81     Absolute Immature Grans 02/15/2024 0.01     Absolute Lymphocytes 02/15/2024 1.39     Absolute Monocytes 02/15/2024 0.52     Eosinophils Absolute 02/15/2024 0.17     Basophils Absolute 02/15/2024 0.04     TSH 3RD GENERATON 02/15/2024 1.818     PSA 02/15/2024 1.10          Radiology Results:   No results found.

## 2024-10-14 NOTE — H&P (VIEW-ONLY)
[facility] - Outpatient Note  Sixto Adan 54 y.o. male MRN: [id number]  Encounter: [id number]      ASSESSMENT AND PLAN:    Sixto Adan is a 54 y.o. old pleasant male with PMH of GERD, LEANNE, obesity who presents for followup    GERD  Dysphagia   bloating, early satiety  Plan for EGD with biopsies for EOE, H. pylori and celiac disease.  Will hold off on empiric dilation unless deemed appropriate at the time of the endoscopy.  Okay to trial omeprazole taper with 20 mg every other day for 4 weeks then every third day for 2 weeks then off.  GERD lifestyle changes discussed, including avoidance of trigger foods (potential foods include coffee, caffeine, chocolate, mint, tomato-based products, spicy foods, fatty foods), avoid tight fitting clothing, elevated head of bed 30 degrees, avoid eating 2-3 hours prior to bedtime, weight loss, avoid alcohol, avoid tobacco use.  Consider gastric emptying study in the future    Colon cancer screening-no previous colonoscopy.  No family history of colon cancer.  Plan for colonoscopy    Constipation, bloating-his constipation may be the reason for his bloating  Increase fluid intake  Increase fiber intake  Start psyllium fiber  Use stepping stool  Consider MiraLAX  Patient will see how he feels after his colonoscopy and if he feels much better than his bloating is likely from his constipation      Obesity -mild.  BMI 33.96.  Counseled on diet exercise and weight loss.    1. Gastroesophageal reflux disease without esophagitis    - Ambulatory Referral to Gastroenterology    ______________________________________________________________________    SUBJECTIVE:      Patient reports history of uncontrolled GERD requiring omeprazole.  He was been on omeprazole for 13 years with no previous EGD.  He has previously tried to taper with symptomatic bounce back.  He is trying to taper now again.  He was previously on 40 mg daily and has decreased to 20 mg daily.  He  inquires about decreasing again.  He also reports dysphagia to solids.  Reports early satiety and bloating.  He has constipation with bowel movement every other day but sometimes with incomplete evacuation.      I reviewed prior external notes    I reviewed previous lab results and images      REVIEW OF SYSTEMS:     REVIEW OF ALL OTHER SYSTEMS IS OTHERWISE NEGATIVE.      Historical Information   Past Medical History:   Diagnosis Date    Anxiety     GERD (gastroesophageal reflux disease)     OCD (obsessive compulsive disorder)     Sleep apnea     USES CPAP HS    Umbilical hernia     last assessed 08/30/17     Past Surgical History:   Procedure Laterality Date    ARTHROSCOPY KNEE Left     BICEPS TENDON REPAIR Right 5/1/2023    Procedure: REPAIR TENDON BICEPS- right and all necessary procedures;  Surgeon: Jordana Lucas;  Location:  MAIN OR;  Service: Orthopedics    HERNIA REPAIR  2017    KNEE SURGERY  2005    CT RPR UMBILICAL HRNA 5 YRS/> REDUCIBLE N/A 10/27/2017    Procedure: REPAIR HERNIA UMBILICAL with mesh;  Surgeon: Porfirio Crenshaw MD;  Location:  MAIN OR;  Service: General     Social History   Social History     Substance and Sexual Activity   Alcohol Use Yes    Alcohol/week: 5.0 standard drinks of alcohol    Types: 5 Cans of beer per week     Social History     Substance and Sexual Activity   Drug Use No     Social History     Tobacco Use   Smoking Status Never   Smokeless Tobacco Former    Types: Snuff    Quit date: 8/10/2020     Family History   Problem Relation Age of Onset    No Known Problems Mother     Other Father         adopted by father    Diabetes Son     Diabetes Son        Meds/Allergies       Current Outpatient Medications:     escitalopram (LEXAPRO) 10 mg tablet    omeprazole (PriLOSEC) 20 mg delayed release capsule    tadalafil (CIALIS) 10 MG tablet    Allergies   Allergen Reactions    Penicillin V Hives    Clindamycin Rash     Whole body rash, no airway symptoms    Chlorhexidine Gluconate  "Rash     Allergic to CHG clothes, no PCN/Clindamycin           Objective     Blood pressure 126/86, pulse 69, temperature 97.5 °F (36.4 °C), temperature source Tympanic, height 6' 1\" (1.854 m), weight 117 kg (257 lb 6.4 oz), SpO2 98%. Body mass index is 33.96 kg/m².      PHYSICAL EXAM:      General Appearance:   Alert, cooperative, no distress   HEENT:   Normocephalic, atraumatic, anicteric.     Neck:  Supple, symmetrical, trachea midline   Lungs:   Clear to auscultation bilaterally; no rales, rhonchi or wheezing; respirations unlabored    Heart::   Regular rate and rhythm; no murmur, rub, or gallop.   Abdomen:   Soft, non-tender, non-distended; normal bowel sounds; no masses, no organomegaly    Genitalia:   Deferred    Rectal:   Deferred    Extremities:  No cyanosis, clubbing or edema    Pulses:  2+ and symmetric    Skin:  No jaundice, rashes, or lesions    Lymph nodes:  No palpable cervical lymphadenopathy        Lab Results:   No visits with results within 1 Day(s) from this visit.   Latest known visit with results is:   Appointment on 02/15/2024   Component Date Value    Cholesterol 02/15/2024 182     Triglycerides 02/15/2024 192 (H)     HDL, Direct 02/15/2024 38 (L)     LDL Calculated 02/15/2024 106 (H)     Sodium 02/15/2024 140     Potassium 02/15/2024 4.2     Chloride 02/15/2024 100     CO2 02/15/2024 32     ANION GAP 02/15/2024 8     BUN 02/15/2024 18     Creatinine 02/15/2024 1.27     Glucose, Fasting 02/15/2024 85     Calcium 02/15/2024 9.0     AST 02/15/2024 25     ALT 02/15/2024 25     Alkaline Phosphatase 02/15/2024 46     Total Protein 02/15/2024 7.0     Albumin 02/15/2024 4.4     Total Bilirubin 02/15/2024 0.63     eGFR 02/15/2024 64     WBC 02/15/2024 4.94     RBC 02/15/2024 5.65 (H)     Hemoglobin 02/15/2024 17.5 (H)     Hematocrit 02/15/2024 53.3 (H)     MCV 02/15/2024 94     MCH 02/15/2024 31.0     MCHC 02/15/2024 32.8     RDW 02/15/2024 12.6     MPV 02/15/2024 11.2     Platelets 02/15/2024 240  "    nRBC 02/15/2024 0     Segmented % 02/15/2024 57     Immature Grans % 02/15/2024 0     Lymphocytes % 02/15/2024 28     Monocytes % 02/15/2024 11     Eosinophils Relative 02/15/2024 3     Basophils Relative 02/15/2024 1     Absolute Neutrophils 02/15/2024 2.81     Absolute Immature Grans 02/15/2024 0.01     Absolute Lymphocytes 02/15/2024 1.39     Absolute Monocytes 02/15/2024 0.52     Eosinophils Absolute 02/15/2024 0.17     Basophils Absolute 02/15/2024 0.04     TSH 3RD GENERATON 02/15/2024 1.818     PSA 02/15/2024 1.10          Radiology Results:   No results found.

## 2024-10-14 NOTE — PATIENT INSTRUCTIONS
Scheduled date of EGD/colonoscopy (as of today):10/25/24  Physician performing EGD/colonoscopy:Kishan  Location of EGD/colonoscopy:[facility]  Desired bowel prep reviewed with patient:Lester/Miralax  Instructions reviewed with patient by:Jesus santos  Clearances:   none

## 2024-10-25 ENCOUNTER — ANESTHESIA EVENT (OUTPATIENT)
Dept: GASTROENTEROLOGY | Facility: HOSPITAL | Age: 54
End: 2024-10-25
Payer: COMMERCIAL

## 2024-10-25 ENCOUNTER — ANESTHESIA (OUTPATIENT)
Dept: GASTROENTEROLOGY | Facility: HOSPITAL | Age: 54
End: 2024-10-25
Payer: COMMERCIAL

## 2024-10-25 ENCOUNTER — HOSPITAL ENCOUNTER (OUTPATIENT)
Dept: GASTROENTEROLOGY | Facility: HOSPITAL | Age: 54
Setting detail: OUTPATIENT SURGERY
End: 2024-10-25
Attending: INTERNAL MEDICINE
Payer: COMMERCIAL

## 2024-10-25 VITALS
WEIGHT: 254.19 LBS | HEART RATE: 69 BPM | RESPIRATION RATE: 20 BRPM | TEMPERATURE: 97.7 F | SYSTOLIC BLOOD PRESSURE: 111 MMHG | HEIGHT: 73 IN | BODY MASS INDEX: 33.69 KG/M2 | DIASTOLIC BLOOD PRESSURE: 66 MMHG | OXYGEN SATURATION: 97 %

## 2024-10-25 DIAGNOSIS — R13.10 DYSPHAGIA, UNSPECIFIED TYPE: ICD-10-CM

## 2024-10-25 DIAGNOSIS — Z12.11 SCREENING FOR COLON CANCER: ICD-10-CM

## 2024-10-25 DIAGNOSIS — K21.9 GASTROESOPHAGEAL REFLUX DISEASE WITHOUT ESOPHAGITIS: ICD-10-CM

## 2024-10-25 PROCEDURE — 88341 IMHCHEM/IMCYTCHM EA ADD ANTB: CPT | Performed by: PATHOLOGY

## 2024-10-25 PROCEDURE — 88313 SPECIAL STAINS GROUP 2: CPT | Performed by: PATHOLOGY

## 2024-10-25 PROCEDURE — 43239 EGD BIOPSY SINGLE/MULTIPLE: CPT | Performed by: INTERNAL MEDICINE

## 2024-10-25 PROCEDURE — 88342 IMHCHEM/IMCYTCHM 1ST ANTB: CPT | Performed by: PATHOLOGY

## 2024-10-25 PROCEDURE — 45385 COLONOSCOPY W/LESION REMOVAL: CPT | Performed by: INTERNAL MEDICINE

## 2024-10-25 PROCEDURE — 88305 TISSUE EXAM BY PATHOLOGIST: CPT | Performed by: PATHOLOGY

## 2024-10-25 PROCEDURE — 43248 EGD GUIDE WIRE INSERTION: CPT | Performed by: INTERNAL MEDICINE

## 2024-10-25 RX ORDER — PHENYLEPHRINE HCL IN 0.9% NACL 1 MG/10 ML
SYRINGE (ML) INTRAVENOUS AS NEEDED
Status: DISCONTINUED | OUTPATIENT
Start: 2024-10-25 | End: 2024-10-25

## 2024-10-25 RX ORDER — LIDOCAINE HYDROCHLORIDE 20 MG/ML
INJECTION, SOLUTION EPIDURAL; INFILTRATION; INTRACAUDAL; PERINEURAL AS NEEDED
Status: DISCONTINUED | OUTPATIENT
Start: 2024-10-25 | End: 2024-10-25

## 2024-10-25 RX ORDER — GLYCOPYRROLATE 0.2 MG/ML
INJECTION INTRAMUSCULAR; INTRAVENOUS AS NEEDED
Status: DISCONTINUED | OUTPATIENT
Start: 2024-10-25 | End: 2024-10-25

## 2024-10-25 RX ORDER — SODIUM CHLORIDE, SODIUM LACTATE, POTASSIUM CHLORIDE, CALCIUM CHLORIDE 600; 310; 30; 20 MG/100ML; MG/100ML; MG/100ML; MG/100ML
INJECTION, SOLUTION INTRAVENOUS CONTINUOUS PRN
Status: DISCONTINUED | OUTPATIENT
Start: 2024-10-25 | End: 2024-10-25

## 2024-10-25 RX ORDER — PROPOFOL 10 MG/ML
INJECTION, EMULSION INTRAVENOUS AS NEEDED
Status: DISCONTINUED | OUTPATIENT
Start: 2024-10-25 | End: 2024-10-25

## 2024-10-25 RX ORDER — SODIUM CHLORIDE, SODIUM LACTATE, POTASSIUM CHLORIDE, CALCIUM CHLORIDE 600; 310; 30; 20 MG/100ML; MG/100ML; MG/100ML; MG/100ML
50 INJECTION, SOLUTION INTRAVENOUS CONTINUOUS
Status: DISPENSED | OUTPATIENT
Start: 2024-10-25

## 2024-10-25 RX ADMIN — SODIUM CHLORIDE, SODIUM LACTATE, POTASSIUM CHLORIDE, AND CALCIUM CHLORIDE 50 ML/HR: .6; .31; .03; .02 INJECTION, SOLUTION INTRAVENOUS at 08:49

## 2024-10-25 RX ADMIN — LIDOCAINE HYDROCHLORIDE 100 MG: 20 INJECTION, SOLUTION EPIDURAL; INFILTRATION; INTRACAUDAL; PERINEURAL at 09:46

## 2024-10-25 RX ADMIN — SODIUM CHLORIDE, SODIUM LACTATE, POTASSIUM CHLORIDE, CALCIUM CHLORIDE: 600; 310; 30; 20 INJECTION, SOLUTION INTRAVENOUS at 09:39

## 2024-10-25 RX ADMIN — Medication 200 MCG: at 10:04

## 2024-10-25 RX ADMIN — GLYCOPYRROLATE 0.2 MG: 0.2 INJECTION INTRAMUSCULAR; INTRAVENOUS at 10:02

## 2024-10-25 RX ADMIN — Medication 200 MCG: at 09:55

## 2024-10-25 RX ADMIN — PROPOFOL 150 MG: 10 INJECTION, EMULSION INTRAVENOUS at 09:44

## 2024-10-25 RX ADMIN — GLYCOPYRROLATE 0.2 MG: 0.2 INJECTION INTRAMUSCULAR; INTRAVENOUS at 09:57

## 2024-10-25 RX ADMIN — PROPOFOL 200 MCG/KG/MIN: 10 INJECTION, EMULSION INTRAVENOUS at 09:45

## 2024-10-25 NOTE — ANESTHESIA PREPROCEDURE EVALUATION
Procedure:  COLONOSCOPY  EGD    Relevant Problems   CARDIO   (+) Borderline hyperlipidemia      GI/HEPATIC   (+) Esophageal reflux      NEURO/PSYCH   (+) Chronic pain syndrome      PULMONARY   (+) LEANNE (obstructive sleep apnea)      Other   (+) Obesity, Class II, BMI 35-39.9, isolated (see actual BMI)      Adjustment reaction with anxiety and depression   Allergic drug rash   Borderline hyperlipidemia   Chronic pain syndrome   Elevated LFTs   Erectile dysfunction of non-organic origin   Esophageal reflux   Fatigue   IFG (impaired fasting glucose)   Immunization due   Low testosterone in male   Lumbar disc disease with radiculopathy   Non-restorative sleep   LEANNE (obstructive sleep apnea)   Obesity, Class II, BMI 35-39.9, isolated (see actual BMI)   Rupture of right distal biceps tendon   SIRS (systemic inflammatory response syndrome) (HCC)   Screening for blood or protein in urine   Snoring       Physical Exam    Airway    Mallampati score: II  TM Distance: >3 FB  Neck ROM: full     Dental       Cardiovascular  Cardiovascular exam normal    Pulmonary  Pulmonary exam normal     Other Findings        Anesthesia Plan  ASA Score- 2     Anesthesia Type- IV sedation with anesthesia with ASA Monitors.         Additional Monitors:     Airway Plan:            Plan Factors-Exercise tolerance (METS): >4 METS.    Chart reviewed. EKG reviewed. Imaging results reviewed. Existing labs reviewed. Patient summary reviewed.                  Induction- intravenous.    Postoperative Plan-         Informed Consent- Anesthetic plan and risks discussed with patient.  I personally reviewed this patient with the CRNA. Discussed and agreed on the Anesthesia Plan with the CRNA..

## 2024-10-25 NOTE — ANESTHESIA POSTPROCEDURE EVALUATION
Post-Op Assessment Note    CV Status:  Stable  Pain Score: 0    Pain management: adequate       Mental Status:  Sleepy and arousable   Hydration Status:  Euvolemic and stable   PONV Controlled:  Controlled   Airway Patency:  Patent and adequate     Post Op Vitals Reviewed: Yes    No anethesia notable event occurred.    Staff: Anesthesiologist, CRNA           Last Filed PACU Vitals:  Vitals Value Taken Time   Temp 97.7 °F (36.5 °C) 10/25/24 1012   Pulse 70 10/25/24 1022   /74 10/25/24 1022   Resp 20 10/25/24 1022   SpO2 99 % 10/25/24 1022       Modified Shayla:  Activity: 2 (10/25/2024 10:22 AM)  Respiration: 2 (10/25/2024 10:22 AM)  Circulation: 2 (10/25/2024 10:22 AM)  Consciousness: 2 (10/25/2024 10:22 AM)  Oxygen Saturation: 2 (10/25/2024 10:22 AM)  Modified Shayla Score: 10 (10/25/2024 10:22 AM)

## 2024-10-25 NOTE — INTERVAL H&P NOTE
H&P reviewed. After examining the patient I find no changes in the patients condition since the H&P had been written.    Vitals:    10/25/24 0839   BP: 131/72   Pulse: 68   Resp: 16   Temp: 97.7 °F (36.5 °C)   SpO2: 98%

## 2024-10-30 PROCEDURE — 88341 IMHCHEM/IMCYTCHM EA ADD ANTB: CPT | Performed by: PATHOLOGY

## 2024-10-30 PROCEDURE — 88313 SPECIAL STAINS GROUP 2: CPT | Performed by: PATHOLOGY

## 2024-10-30 PROCEDURE — 88305 TISSUE EXAM BY PATHOLOGIST: CPT | Performed by: PATHOLOGY

## 2024-10-30 PROCEDURE — 88342 IMHCHEM/IMCYTCHM 1ST ANTB: CPT | Performed by: PATHOLOGY

## 2024-12-16 ENCOUNTER — VBI (OUTPATIENT)
Dept: ADMINISTRATIVE | Facility: OTHER | Age: 54
End: 2024-12-16

## 2024-12-16 NOTE — TELEPHONE ENCOUNTER
12/16/24 5:09 PM     Chart reviewed for CRC: Colonoscopy was/were submitted to the patient's insurance.     Tre Dorsey MA   [facility]

## 2025-01-11 DIAGNOSIS — F43.23 ADJUSTMENT REACTION WITH ANXIETY AND DEPRESSION: ICD-10-CM

## 2025-01-13 RX ORDER — ESCITALOPRAM OXALATE 10 MG/1
10 TABLET ORAL DAILY
Qty: 90 TABLET | Refills: 1 | Status: SHIPPED | OUTPATIENT
Start: 2025-01-13

## 2025-01-30 DIAGNOSIS — N52.9 ERECTILE DYSFUNCTION, UNSPECIFIED ERECTILE DYSFUNCTION TYPE: ICD-10-CM

## 2025-01-30 RX ORDER — TADALAFIL 10 MG/1
10 TABLET ORAL DAILY
Qty: 90 TABLET | Refills: 1 | Status: SHIPPED | OUTPATIENT
Start: 2025-01-30

## 2025-01-30 NOTE — TELEPHONE ENCOUNTER
Message sent via [employer].   Good Morning Ghislaine,     For some reason my Tadalifil prescription has not been approved for refill. Can this be done and sent to [facility] in [place]. Thank You.

## 2025-02-14 ENCOUNTER — RA CDI HCC (OUTPATIENT)
Dept: OTHER | Facility: HOSPITAL | Age: 55
End: 2025-02-14

## 2025-02-14 DIAGNOSIS — E66.811 OBESITY, CLASS I, BMI 30.0-34.9 (SEE ACTUAL BMI): Primary | ICD-10-CM

## 2025-02-14 PROBLEM — R65.10 SIRS (SYSTEMIC INFLAMMATORY RESPONSE SYNDROME) (HCC): Status: RESOLVED | Noted: 2023-05-04 | Resolved: 2025-02-14

## 2025-02-14 PROBLEM — E66.812 OBESITY, CLASS II, BMI 35-39.9, ISOLATED (SEE ACTUAL BMI): Status: RESOLVED | Noted: 2023-08-02 | Resolved: 2025-02-14

## 2025-02-17 DIAGNOSIS — K21.9 GASTROESOPHAGEAL REFLUX DISEASE WITHOUT ESOPHAGITIS: ICD-10-CM

## 2025-03-25 ENCOUNTER — OFFICE VISIT (OUTPATIENT)
Dept: FAMILY MEDICINE CLINIC | Facility: MEDICAL CENTER | Age: 55
End: 2025-03-25
Payer: COMMERCIAL

## 2025-03-25 VITALS
BODY MASS INDEX: 34.27 KG/M2 | RESPIRATION RATE: 17 BRPM | HEART RATE: 77 BPM | HEIGHT: 73 IN | WEIGHT: 258.6 LBS | SYSTOLIC BLOOD PRESSURE: 124 MMHG | TEMPERATURE: 99.2 F | OXYGEN SATURATION: 98 % | DIASTOLIC BLOOD PRESSURE: 82 MMHG

## 2025-03-25 DIAGNOSIS — E78.5 BORDERLINE HYPERLIPIDEMIA: ICD-10-CM

## 2025-03-25 DIAGNOSIS — Z12.5 SCREENING FOR PROSTATE CANCER: ICD-10-CM

## 2025-03-25 DIAGNOSIS — K21.9 GASTROESOPHAGEAL REFLUX DISEASE WITHOUT ESOPHAGITIS: ICD-10-CM

## 2025-03-25 DIAGNOSIS — Z13.1 DIABETES MELLITUS SCREENING: ICD-10-CM

## 2025-03-25 DIAGNOSIS — F43.23 ADJUSTMENT REACTION WITH ANXIETY AND DEPRESSION: ICD-10-CM

## 2025-03-25 DIAGNOSIS — M51.16 LUMBAR DISC DISEASE WITH RADICULOPATHY: ICD-10-CM

## 2025-03-25 DIAGNOSIS — Z00.00 ANNUAL PHYSICAL EXAM: Primary | ICD-10-CM

## 2025-03-25 DIAGNOSIS — G47.33 OSA (OBSTRUCTIVE SLEEP APNEA): ICD-10-CM

## 2025-03-25 PROBLEM — Z23 IMMUNIZATION DUE: Status: RESOLVED | Noted: 2021-05-04 | Resolved: 2025-03-25

## 2025-03-25 PROCEDURE — 99396 PREV VISIT EST AGE 40-64: CPT

## 2025-03-25 PROCEDURE — 99214 OFFICE O/P EST MOD 30 MIN: CPT

## 2025-03-25 NOTE — PATIENT INSTRUCTIONS
"Patient Education     Routine physical for adults   The Basics   Written by the doctors and editors at [facility]   What is a physical? -- A physical is a routine visit, or \"check-up,\" with your doctor. You might also hear it called a \"wellness visit\" or \"preventive visit.\"  During each visit, the doctor will:   Ask about your physical and mental health   Ask about your habits, behaviors, and lifestyle   Do an exam   Give you vaccines if needed   Talk to you about any medicines you take   Give advice about your health   Answer your questions  Getting regular check-ups is an important part of taking care of your health. It can help your doctor find and treat any problems you have. But it's also important for preventing health problems.  A routine physical is different from a \"sick visit.\" A sick visit is when you see a doctor because of a health concern or problem. Since physicals are scheduled ahead of time, you can think about what you want to ask the doctor.  How often should I get a physical? -- It depends on your age and health. In general, for people age 21 years and older:   If you are younger than 50 years, you might be able to get a physical every 3 years.   If you are 50 years or older, your doctor might recommend a physical every year.  If you have an ongoing health condition, like diabetes or high blood pressure, your doctor will probably want to see you more often.  What happens during a physical? -- In general, each visit will include:   Physical exam - The doctor or nurse will check your height, weight, heart rate, and blood pressure. They will also look at your eyes and ears. They will ask about how you are feeling and whether you have any symptoms that bother you.   Medicines - It's a good idea to bring a list of all the medicines you take to each doctor visit. Your doctor will talk to you about your medicines and answer any questions. Tell them if you are having any side effects that bother you. You " "should also tell them if you are having trouble paying for any of your medicines.   Habits and behaviors - This includes:   Your diet   Your exercise habits   Whether you smoke, drink alcohol, or use drugs   Whether you are sexually active   Whether you feel safe at home  Your doctor will talk to you about things you can do to improve your health and lower your risk of health problems. They will also offer help and support. For example, if you want to quit smoking, they can give you advice and might prescribe medicines. If you want to improve your diet or get more physical activity, they can help you with this, too.   Lab tests, if needed - The tests you get will depend on your age and situation. For example, your doctor might want to check your:   Cholesterol   Blood sugar   Iron level   Vaccines - The recommended vaccines will depend on your age, health, and what vaccines you already had. Vaccines are very important because they can prevent certain serious or deadly infections.   Discussion of screening - \"Screening\" means checking for diseases or other health problems before they cause symptoms. Your doctor can recommend screening based on your age, risk, and preferences. This might include tests to check for:   Cancer, such as breast, prostate, cervical, ovarian, colorectal, prostate, lung, or skin cancer   Sexually transmitted infections, such as chlamydia and gonorrhea   Mental health conditions like depression and anxiety  Your doctor will talk to you about the different types of screening tests. They can help you decide which screenings to have. They can also explain what the results might mean.   Answering questions - The physical is a good time to ask the doctor or nurse questions about your health. If needed, they can refer you to other doctors or specialists, too.  Adults older than 65 years often need other care, too. As you get older, your doctor will talk to you about:   How to prevent falling at " home   Hearing or vision tests   Memory testing   How to take your medicines safely   Making sure that you have the help and support you need at home  All topics are updated as new evidence becomes available and our peer review process is complete.  This topic retrieved from [employer] on: May 02, 2024.  Topic 606059 Version 1.0  Release: 32.4.3 - C32.122  © 2024 UpToDate, Inc. and/or its affiliates. All rights reserved.  Consumer Information Use and Disclaimer   Disclaimer: This generalized information is a limited summary of diagnosis, treatment, and/or medication information. It is not meant to be comprehensive and should be used as a tool to help the user understand and/or assess potential diagnostic and treatment options. It does NOT include all information about conditions, treatments, medications, side effects, or risks that may apply to a specific patient. It is not intended to be medical advice or a substitute for the medical advice, diagnosis, or treatment of a health care provider based on the health care provider's examination and assessment of a patient's specific and unique circumstances. Patients must speak with a health care provider for complete information about their health, medical questions, and treatment options, including any risks or benefits regarding use of medications. This information does not endorse any treatments or medications as safe, effective, or approved for treating a specific patient. UpToDate, Inc. and its affiliates disclaim any warranty or liability relating to this information or the use thereof.The use of this information is governed by the Terms of Use, available at https://www.woltersSumoSkinnyuwer.com/en/know/clinical-effectiveness-terms. 2024© UpToDate, Inc. and its affiliates and/or licensors. All rights reserved.  Copyright   © 2024 UpToDate, Inc. and/or its affiliates. All rights reserved.

## 2025-03-25 NOTE — ASSESSMENT & PLAN NOTE
Encouraged patient to follow-up with pain management Dr. Somers who he has previously seen for injections.

## 2025-03-25 NOTE — PROGRESS NOTES
Adult Annual Physical  Name: Sixto Adan      : 1970      MRN: [id number]  Encounter Provider: CLARICE Barr  Encounter Date: 3/25/2025   Encounter department: [facility]    Assessment & Plan  Annual physical exam  Advised about Shingrix vaccine.  All other vaccinations up-to-date.  Colonoscopy up-to-date.  Check fasting labs at earliest convenience.  Return for follow-up in 6 months, sooner if needed.       Screening for prostate cancer    Orders:    PSA, Total Screen; Future    Borderline hyperlipidemia  Lipid panel last year overall unremarkable.  No current medication regimen for hyperlipidemia.  Controlled with diet and regular exercise.  Due for repeat labs.  Orders:    Comprehensive metabolic panel; Future    Lipid panel; Future    Diabetes mellitus screening    Orders:    Hemoglobin A1C; Future    LEANNE (obstructive sleep apnea)  Compliant with CPAP.       Gastroesophageal reflux disease without esophagitis  Stable with omeprazole 20 mg daily, patient would like to wean down and possibly off of this medication, advised to try 20 mg every other day for a few weeks and then discontinue if able to.  Advised to avoid food triggers.       Lumbar disc disease with radiculopathy  Encouraged patient to follow-up with pain management Dr. Somers who he has previously seen for injections.       Adjustment reaction with anxiety and depression  Stable with current management.  Continue escitalopram at current dose.             Immunizations:  - Immunizations due: Influenza and Zoster (Shingrix)         History of Present Illness     Adult Annual Physical:  Patient presents for annual physical. 54 year old male presents for annual physical exam.  He is doing very well overall with the exception of his chronic low back pain that is now radiating into bilateral hips.  He did previously see pain management Dr. Somers in the past, last injection .  Otherwise, he is feeling very  "well overall and offers no concerns or complaints.  He is agreeable to fasting blood work.  He does follow a healthy diet and tries to exercise regularly with the exception of his lower back pain which is limiting him at this time.  He has sleep apnea, reports compliance with CPAP.  He has reflux, well-controlled with omeprazole in which he would like to try to wean down and off of as his symptoms are under great control at this time.  .     Diet and Physical Activity:  - Diet/Nutrition: well balanced diet, portion control, limited junk food, consuming 3-5 servings of fruits/vegetables daily, adequate fiber intake and adequate whole grain intake.  - Exercise: walking, moderate cardiovascular exercise, strength training exercises, 3-4 times a week on average and 1-2 hours on average.    Depression Screening:    - PHQ-9 Score: 3    General Health:  - Sleep: 7-8 hours of sleep on average and uses CPAP machine.  - Hearing: normal hearing bilateral ears.  - Vision: vision problems and most recent eye exam < 1 year ago.  - Dental: brushes teeth twice daily.    /GYN Health:  - Follows with GYN: no.   - History of STDs: no     Health:  - History of STDs: no.   - Urinary symptoms: none.     Advanced Care Planning:  - Has an advanced directive?: yes    - Has a durable medical POA?: yes      Review of Systems   Constitutional: Negative.    HENT: Negative.     Eyes: Negative.    Respiratory: Negative.     Cardiovascular: Negative.    Gastrointestinal: Negative.    Endocrine: Negative.    Genitourinary: Negative.    Musculoskeletal:  Positive for back pain (chronic).   Skin: Negative.    Allergic/Immunologic: Negative.    Neurological: Negative.    Hematological: Negative.    Psychiatric/Behavioral: Negative.         Objective   /82 (BP Location: Left arm, Patient Position: Sitting, Cuff Size: Large)   Pulse 77   Temp 99.2 °F (37.3 °C) (Temporal)   Resp 17   Ht 6' 1\" (1.854 m)   Wt 117 kg (258 lb 9.6 oz)   SpO2 " 98%   BMI 34.12 kg/m²     Physical Exam  Vitals and nursing note reviewed.   Constitutional:       General: He is not in acute distress.     Appearance: Normal appearance. He is obese. He is not ill-appearing.   HENT:      Head: Normocephalic and atraumatic.      Right Ear: Tympanic membrane, ear canal and external ear normal.      Left Ear: Tympanic membrane, ear canal and external ear normal.      Nose: Nose normal.      Mouth/Throat:      Mouth: Mucous membranes are moist.      Pharynx: Oropharynx is clear.   Eyes:      General:         Right eye: No discharge.         Left eye: No discharge.      Conjunctiva/sclera: Conjunctivae normal.      Pupils: Pupils are equal, round, and reactive to light.   Cardiovascular:      Rate and Rhythm: Normal rate and regular rhythm.      Pulses: Normal pulses.      Heart sounds: Normal heart sounds.   Pulmonary:      Effort: Pulmonary effort is normal.      Breath sounds: Normal breath sounds.   Abdominal:      General: Abdomen is flat. Bowel sounds are normal.      Palpations: Abdomen is soft.      Tenderness: There is no abdominal tenderness. There is no guarding.   Musculoskeletal:      Cervical back: Normal range of motion and neck supple.      Lumbar back: Decreased range of motion.   Skin:     General: Skin is warm and dry.   Neurological:      General: No focal deficit present.      Mental Status: He is alert and oriented to person, place, and time. Mental status is at baseline.   Psychiatric:         Mood and Affect: Mood normal.         Behavior: Behavior normal.         Thought Content: Thought content normal.

## 2025-03-25 NOTE — ASSESSMENT & PLAN NOTE
Lipid panel last year overall unremarkable.  No current medication regimen for hyperlipidemia.  Controlled with diet and regular exercise.  Due for repeat labs.  Orders:    Comprehensive metabolic panel; Future    Lipid panel; Future

## 2025-03-25 NOTE — ASSESSMENT & PLAN NOTE
Stable with omeprazole 20 mg daily, patient would like to wean down and possibly off of this medication, advised to try 20 mg every other day for a few weeks and then discontinue if able to.  Advised to avoid food triggers.

## 2025-04-14 DIAGNOSIS — N52.9 ERECTILE DYSFUNCTION, UNSPECIFIED ERECTILE DYSFUNCTION TYPE: ICD-10-CM

## 2025-04-15 RX ORDER — TADALAFIL 10 MG/1
10 TABLET ORAL DAILY
Qty: 90 TABLET | Refills: 1 | Status: SHIPPED | OUTPATIENT
Start: 2025-04-15

## 2025-07-11 DIAGNOSIS — F43.23 ADJUSTMENT REACTION WITH ANXIETY AND DEPRESSION: ICD-10-CM

## 2025-07-11 RX ORDER — ESCITALOPRAM OXALATE 10 MG/1
10 TABLET ORAL DAILY
Qty: 90 TABLET | Refills: 0 | Status: SHIPPED | OUTPATIENT
Start: 2025-07-11

## (undated) DEVICE — GLOVE SRG BIOGEL 8

## (undated) DEVICE — DRAPE C-ARM X-RAY

## (undated) DEVICE — TOWEL SET X-RAY

## (undated) DEVICE — IMPERVIOUS STOCKINETTE: Brand: DEROYAL

## (undated) DEVICE — SCD SEQUENTIAL COMPRESSION COMFORT SLEEVE LARGE KNEE LENGTH: Brand: KENDALL SCD

## (undated) DEVICE — GLOVE INDICATOR PI UNDERGLOVE SZ 8 BLUE

## (undated) DEVICE — SPLINT ORTHO-GLASS 4IN X 15FT

## (undated) DEVICE — GLOVE SRG BIOGEL ORTHOPEDIC 7.5

## (undated) DEVICE — DRESSING MEPILEX AG BORDER 4 X 4 IN

## (undated) DEVICE — ADHESIVE SKN CLSR HISTOACRYL FLEX 0.5ML LF

## (undated) DEVICE — INTENDED FOR TISSUE SEPARATION, AND OTHER PROCEDURES THAT REQUIRE A SHARP SURGICAL BLADE TO PUNCTURE OR CUT.: Brand: BARD-PARKER SAFETY BLADES SIZE 15, STERILE

## (undated) DEVICE — U-DRAPE: Brand: CONVERTORS

## (undated) DEVICE — INTENDED FOR TISSUE SEPARATION, AND OTHER PROCEDURES THAT REQUIRE A SHARP SURGICAL BLADE TO PUNCTURE OR CUT.: Brand: BARD-PARKER SAFETY BLADES SIZE 11, STERILE

## (undated) DEVICE — SUT MONOCRYL 3-0 PS-2 18 IN Y497G

## (undated) DEVICE — MEDI-VAC YANK SUCT HNDL W/TPRD BULBOUS TIP: Brand: CARDINAL HEALTH

## (undated) DEVICE — TUBING SUCTION 5MM X 12 FT

## (undated) DEVICE — REM POLYHESIVE ADULT PATIENT RETURN ELECTRODE: Brand: VALLEYLAB

## (undated) DEVICE — STERILE BETHLEHEM PLASTIC HAND: Brand: CARDINAL HEALTH

## (undated) DEVICE — CUFF TOURNIQUET 18 X 4 IN QUICK CONNECT DISP 1 BLADDER

## (undated) DEVICE — DRESSING MEPILEX AG BORDER POST-OP 4 X 8 IN

## (undated) DEVICE — KIT BIO-TENODESIS BIOABS

## (undated) DEVICE — COBAN 4 IN STERILE

## (undated) DEVICE — ADHESIVE SKIN HIGH VISCOSITY EXOFIN 1ML

## (undated) DEVICE — SUT MONOCRYL 2-0 SH 27 IN Y417H

## (undated) DEVICE — INTENT TO BE USED WITH SUTURE MATERIAL FOR TISSUE CLOSURE: Brand: RICHARD-ALLAN® NEEDLE 1/2 CIRCLE TAPER

## (undated) DEVICE — COTTON BALLS: Brand: DEROYAL

## (undated) DEVICE — ACE WRAP 4 IN STERILE

## (undated) DEVICE — 3M™ TEGADERM™ TRANSPARENT FILM DRESSING FRAME STYLE, 1624W, 2-3/8 IN X 2-3/4 IN (6 CM X 7 CM), 100/CT 4CT/CASE: Brand: 3M™ TEGADERM™

## (undated) DEVICE — PAD CAST 4 IN COTTON NON STERILE

## (undated) DEVICE — CHLORAPREP HI-LITE 26ML ORANGE

## (undated) DEVICE — STRL UNIVERSAL MINOR GENERAL: Brand: CARDINAL HEALTH

## (undated) DEVICE — DISPOSABLE EQUIPMENT COVER: Brand: SMALL TOWEL DRAPE

## (undated) DEVICE — 3M™ STERI-STRIP™ REINFORCED ADHESIVE SKIN CLOSURES, R1547, 1/2 IN X 4 IN (12 MM X 100 MM), 6 STRIPS/ENVELOPE: Brand: 3M™ STERI-STRIP™

## (undated) DEVICE — INTENDED FOR TISSUE SEPARATION, AND OTHER PROCEDURES THAT REQUIRE A SHARP SURGICAL BLADE TO PUNCTURE OR CUT.: Brand: BARD-PARKER ® CARBON RIB-BACK BLADES

## (undated) DEVICE — GLOVE SRG BIOGEL 7.5

## (undated) DEVICE — SUT MONOCRYL 4-0 PS-2 27 IN Y426H

## (undated) DEVICE — PLUMEPEN PRO 10FT

## (undated) DEVICE — LIGHT HANDLE COVER SLEEVE DISP BLUE STELLAR

## (undated) DEVICE — 10FR FRAZIER SUCTION HANDLE: Brand: CARDINAL HEALTH